# Patient Record
Sex: FEMALE | Race: WHITE | NOT HISPANIC OR LATINO | Employment: OTHER | URBAN - METROPOLITAN AREA
[De-identification: names, ages, dates, MRNs, and addresses within clinical notes are randomized per-mention and may not be internally consistent; named-entity substitution may affect disease eponyms.]

---

## 2021-05-14 ENCOUNTER — OFFICE VISIT (OUTPATIENT)
Dept: INTERNAL MEDICINE CLINIC | Facility: CLINIC | Age: 81
End: 2021-05-14
Payer: MEDICARE

## 2021-05-14 ENCOUNTER — APPOINTMENT (OUTPATIENT)
Dept: RADIOLOGY | Facility: CLINIC | Age: 81
End: 2021-05-14
Payer: MEDICARE

## 2021-05-14 VITALS
TEMPERATURE: 97.9 F | HEART RATE: 85 BPM | DIASTOLIC BLOOD PRESSURE: 68 MMHG | OXYGEN SATURATION: 96 % | SYSTOLIC BLOOD PRESSURE: 118 MMHG | WEIGHT: 226.8 LBS

## 2021-05-14 DIAGNOSIS — K59.09 CHRONIC CONSTIPATION: ICD-10-CM

## 2021-05-14 DIAGNOSIS — E11.9 TYPE 2 DIABETES MELLITUS WITHOUT COMPLICATION, WITH LONG-TERM CURRENT USE OF INSULIN (HCC): ICD-10-CM

## 2021-05-14 DIAGNOSIS — I25.810 CORONARY ARTERY DISEASE INVOLVING CORONARY BYPASS GRAFT OF NATIVE HEART WITHOUT ANGINA PECTORIS: ICD-10-CM

## 2021-05-14 DIAGNOSIS — Z79.4 TYPE 2 DIABETES MELLITUS WITHOUT COMPLICATION, WITH LONG-TERM CURRENT USE OF INSULIN (HCC): ICD-10-CM

## 2021-05-14 DIAGNOSIS — I10 ESSENTIAL HYPERTENSION: Primary | ICD-10-CM

## 2021-05-14 DIAGNOSIS — I10 ESSENTIAL HYPERTENSION: ICD-10-CM

## 2021-05-14 DIAGNOSIS — M25.471 RIGHT ANKLE SWELLING: Primary | ICD-10-CM

## 2021-05-14 DIAGNOSIS — M25.571 ACUTE RIGHT ANKLE PAIN: ICD-10-CM

## 2021-05-14 DIAGNOSIS — M25.471 RIGHT ANKLE SWELLING: ICD-10-CM

## 2021-05-14 DIAGNOSIS — I73.9 PERIPHERAL ARTERIAL DISEASE (HCC): ICD-10-CM

## 2021-05-14 PROCEDURE — 73610 X-RAY EXAM OF ANKLE: CPT

## 2021-05-14 PROCEDURE — 99204 OFFICE O/P NEW MOD 45 MIN: CPT | Performed by: FAMILY MEDICINE

## 2021-05-14 RX ORDER — OCTISALATE, AVOBENZONE, HOMOSALATE, AND OCTOCRYLENE 29.4; 29.4; 49; 25.48 MG/ML; MG/ML; MG/ML; MG/ML
1 LOTION TOPICAL
COMMUNITY

## 2021-05-14 RX ORDER — SIMVASTATIN 40 MG
40 TABLET ORAL
COMMUNITY
End: 2021-07-11

## 2021-05-14 RX ORDER — BLOOD SUGAR DIAGNOSTIC
STRIP MISCELLANEOUS
COMMUNITY
Start: 2021-03-29

## 2021-05-14 RX ORDER — CILOSTAZOL 100 MG/1
100 TABLET ORAL 2 TIMES DAILY
COMMUNITY
Start: 2021-03-02

## 2021-05-14 RX ORDER — LANCETS
EACH MISCELLANEOUS
COMMUNITY

## 2021-05-14 RX ORDER — ZOLPIDEM TARTRATE 5 MG/1
5 TABLET ORAL
COMMUNITY

## 2021-05-14 RX ORDER — ALCOHOL ANTISEPTIC PADS
PADS, MEDICATED (EA) TOPICAL
COMMUNITY

## 2021-05-14 RX ORDER — TRIAMCINOLONE ACETONIDE 1 MG/G
CREAM TOPICAL 2 TIMES DAILY
COMMUNITY

## 2021-05-14 RX ORDER — IBUPROFEN 600 MG/1
600 TABLET ORAL
COMMUNITY

## 2021-05-14 RX ORDER — BENAZEPRIL HYDROCHLORIDE 10 MG/1
10 TABLET ORAL DAILY
Qty: 30 TABLET | Refills: 2 | Status: SHIPPED | OUTPATIENT
Start: 2021-05-14 | End: 2021-05-14

## 2021-05-14 RX ORDER — BENAZEPRIL HYDROCHLORIDE 10 MG/1
10 TABLET ORAL DAILY
COMMUNITY
End: 2021-05-14 | Stop reason: SDUPTHER

## 2021-05-14 RX ORDER — EMPAGLIFLOZIN 10 MG/1
10 TABLET, FILM COATED ORAL EVERY MORNING
COMMUNITY
End: 2021-06-29

## 2021-05-14 RX ORDER — SUCRALFATE 1 G/1
1 TABLET ORAL
COMMUNITY
Start: 2021-02-22 | End: 2021-12-12

## 2021-05-14 RX ORDER — INSULIN DEGLUDEC INJECTION 100 U/ML
INJECTION, SOLUTION SUBCUTANEOUS
COMMUNITY

## 2021-05-14 RX ORDER — SIMETHICONE 80 MG
80 TABLET,CHEWABLE ORAL
COMMUNITY

## 2021-05-14 RX ORDER — INSULIN ASPART 100 [IU]/ML
INJECTION, SOLUTION INTRAVENOUS; SUBCUTANEOUS
COMMUNITY
Start: 2021-04-17

## 2021-05-14 RX ORDER — BLOOD-GLUCOSE METER
EACH MISCELLANEOUS
COMMUNITY

## 2021-05-14 RX ORDER — CARBOXYMETHYLCELLULOSE SODIUM AND GLYCERIN 5; 9 MG/ML; MG/ML
SOLUTION/ DROPS OPHTHALMIC
COMMUNITY

## 2021-05-14 RX ORDER — FUROSEMIDE 20 MG/1
20 TABLET ORAL 2 TIMES DAILY
COMMUNITY
End: 2022-03-14 | Stop reason: SDUPTHER

## 2021-05-14 RX ORDER — BENAZEPRIL HYDROCHLORIDE 10 MG/1
10 TABLET ORAL DAILY
Qty: 90 TABLET | Refills: 1 | Status: SHIPPED | OUTPATIENT
Start: 2021-05-14 | End: 2021-11-15

## 2021-05-14 NOTE — PROGRESS NOTES
INITIAL OFFICE VISIT  [facility] - [facility] OF [address] [place] [address]    NAME: Dwight Jiménez  AGE: 68 y o  SEX: female  : 1940     DATE: 2021     Assessment and Plan:     1  Right ankle swelling  2  Acute right ankle pain- hx of ruptures  Acute on set  ROM intact, no gapping or loss of plantarflexion strenght but has swelling and point tenderness over th achilles  differential includes tear vs tendenopathy vs stress fracture  X ray ordered  Pt to continue using otc analgesia     3  Peripheral arterial disease (HCC)-chronic  Stable  Follows with vascular  Pt to continue statin     4  Coronary artery disease involving coronary bypass graft of native heart without angina pectoris-stable  No angina  Follows with cardiology  Pt to continue duel antiplatelet therapy with asa and cilostazol    5  Type 2 diabetes mellitus without complication, with long-term current use of insulin ([address] )- fair control with basal bolus insulin  a1c of 7 6 on 10/2020  Encouraged pt to start jardiance 10mg qd for tighter control and decreased insuline requirement  7  Essential hypertension-well controlled  Pt to continue benazepril and lopressor therapy  - benazepril (LOTENSIN) 10 mg tablet; Take 1 tablet (10 mg total) by mouth daily  Dispense: 30 tablet; Refill: 2    8  Chronic constipation-follows with GI  Has tried multiple therapies  Uses stool softeners and laxatives intermittently  Diet is poor  encouraged pt to increase fiber and water intake  9  Body mass index (BMI) of 40 0 to 44 9 in adult ([facility])-note counseling below  BMI is above normal  Nutrition recommendations include reducing portion sizes, 3-5 servings of fruits/vegetables daily, consuming healthier snacks and reducing intake of saturated fat and trans fat  Return in about 4 months (around 2021) for Next scheduled follow up       Chief Complaint:     Chief Complaint   Patient presents with   [facility]     patient also has some questions and concerns, patient has been having issues with her bowel movements and she is feeling a bit sad because of it and she is having leg pain and she thinks she needs physical therapy  patient thinks her large abdomen maybe ausing her breathing isues when she lays down as well       History of Present Illness:   81-year-old female presents to [facility] care  Last seen previous PCP fall of last year  follow-up for medical history including CAD status post CABG requiring triple grafts, type 2 diabetes, hypertension, hyperlipidemia, peripheral vascular disease, OA of the knees, FILIBERTO recent sleep study  Will be staring the CPAP in the up coming weeks  today patient reports stomach discomfort and leg pain  Leg- acute  Onset x 4weeks  Quality is tight, pulling pain  Right Low posteior calf into the ankle  Pain is aggravated when she extends the leg to stretch or point her toes backward or if she walks too much  Associated with swelling in the ankle  moderaly alleviated with tylenol  Denies injury to foot  Had recent eval in the ed recently and a DVT was ruled out  Concerned for her achilles  Has history of achilles tendon tear of her left foot  Stomach- chronic issue  Onset years  constipation and gas  Taking 3 stool softeners and miralax whenever she goes more than one day without stooling  No blood in stool  Small slivers of stool  Has push a lot  No melena  She sees  A GI provider in  [place]  Uses Sulcrafate  Tired dicyclomine but it worsened the pain  Had been on linzess for a period of time but gave her explosive stools  Had EGD and COLO in 2019  Eats rye and white bread  Vegetable only at dinner time  1 serving of fruit daily  2-3 bottles of water daily  Consumes multiple servings of cheese  Follows with Endocrinology for diabetes  Last seen about 3 months ago  On 18units of tresiba and a sliding scale of the novolog   Was prescribed jardiance her last visit with the specialist but hasn't started it    has been established with vascular for PVD  Has significant plaque burden in both femoral arteries  last seen vascular provider was months ago   last visit with Cardiology was January  No chronic angina  Denies hx of congestive heart failure  Lives with daughter and brother  Brother has dementia  Pt provides care         The following portions of the patient's history were reviewed and updated as appropriate: allergies, current medications, past family history, past medical history, past social history, past surgical history and problem list      Review of Systems:     Review of Systems   Constitutional: Positive for activity change and fatigue  Negative for appetite change  HENT: Positive for rhinorrhea  Negative for congestion and sore throat  Eyes: Negative for visual disturbance (wears glasses )  Respiratory: Negative for cough and chest tightness  Cardiovascular: Negative for chest pain  Gastrointestinal: Positive for abdominal pain and constipation  Negative for blood in stool  Genitourinary: Positive for difficulty urinating (urinary incontenice  )  Negative for hematuria  Musculoskeletal: Positive for arthralgias and myalgias  Negative for gait problem  Allergic/Immunologic: Positive for environmental allergies  Neurological: Negative for headaches  Psychiatric/Behavioral: Positive for sleep disturbance  Past Medical History:   History reviewed  No pertinent past medical history       Past Surgical History:     Past Surgical History:   Procedure Laterality Date    CARDIAC SURGERY          Social History:     Social History     Socioeconomic History    Marital status: Single     Spouse name: None    Number of children: None    Years of education: None    Highest education level: None   Occupational History    None   Social Needs    Financial resource strain: None    Food insecurity     Worry: None     Inability: None    Transportation needs     Medical: None     Non-medical: None   Tobacco Use    Smoking status: Never Smoker    Smokeless tobacco: Never Used   Substance and Sexual Activity    Alcohol use:  Yes     Alcohol/week: 1 0 standard drinks     Types: 1 Glasses of wine per week     Frequency: Monthly or less     Drinks per session: 1 or 2     Binge frequency: Never     Comment: rarely     Drug use: Never    Sexual activity: None   Lifestyle    Physical activity     Days per week: 0 days     Minutes per session: 0 min    Stress: Not at all   Relationships    Social connections     Talks on phone: None     Gets together: None     Attends Synagogue service: None     Active member of club or organization: None     Attends meetings of clubs or organizations: None     Relationship status: None    Intimate partner violence     Fear of current or ex partner: None     Emotionally abused: None     Physically abused: None     Forced sexual activity: None   Other Topics Concern    None   Social History Narrative    None         Family History:     Family History   Problem Relation Age of Onset    Heart disease Mother     Diabetes Mother     Hypertension Mother     Heart disease Father     Hypertension Father     Diabetes Father         Current Medications:     Current Outpatient Medications:     Carboxymethylcellul-Glycerin (Refresh Optive) 0 5-0 9 % SOLN, Apply to eye, Disp: , Rfl:     cilostazol (PLETAL) 100 mg tablet, Take 100 mg by mouth 2 (two) times a day, Disp: , Rfl:     Empagliflozin (Jardiance) 10 MG TABS, Take 10 mg by mouth every morning, Disp: , Rfl:     furosemide (LASIX) 20 mg tablet, Take 20 mg by mouth 2 (two) times a day, Disp: , Rfl:     glucose blood (Accu-Chek Guide) test strip, TEST 4 TIMES DAILY DX E11 65, Disp: , Rfl:     glucose blood test strip, Use 1 each daily as needed Use as instructed, Disp: , Rfl:     insulin aspart (NovoLOG) 100 units/mL injection, Inject under the skin, Disp: , Rfl:     insulin degludec [place]y FlexTouch) 100 units/mL injection pen, Inject under the skin, Disp: , Rfl:     metoprolol tartrate (LOPRESSOR) 25 mg tablet, Take 25 mg by mouth every 12 (twelve) hours, Disp: , Rfl:     simvastatin (ZOCOR) 40 mg tablet, Take 40 mg by mouth daily at bedtime, Disp: , Rfl:     triamcinolone (KENALOG) 0 1 % cream, Apply topically 2 (two) times a day, Disp: , Rfl:     zolpidem (AMBIEN) 5 mg tablet, Take 5 mg by mouth daily at bedtime as needed for sleep, Disp: , Rfl:     Accu-Chek FastClix Lancets MISC, Use, Disp: , Rfl:     Aspirin Buf,CaCarb-MgCarb-MgO, 81 MG TABS, Take 81 mg by mouth, Disp: , Rfl:     B Complex-Biotin-FA (HM Vitamin B100 Complex) TABS, Take by mouth, Disp: , Rfl:     benazepril (LOTENSIN) 10 mg tablet, Take 1 tablet (10 mg total) by mouth daily, Disp: 90 tablet, Rfl: 1    Blood Glucose Monitoring Suppl (Accu-Chek Guide) w/Device KIT, Use, Disp: , Rfl:     ibuprofen (MOTRIN) 600 mg tablet, Take 600 mg by mouth, Disp: , Rfl:     NovoLOG FlexPen 100 units/mL injection pen, 18 UNITS BEFORE MEALS   ISF 40/140 UP TP 75 UNITS PER DAY, Disp: , Rfl:     Probiotic Product (Align) 4 MG CAPS, Take 1 capsule by mouth, Disp: , Rfl:     simethicone (MYLICON) 80 mg chewable tablet, Chew 80 mg, Disp: , Rfl:     sucralfate (CARAFATE) 1 g tablet, Take 1 g by mouth 4 (four) times a day (before meals and at bedtime) Take on an empty stomach 1 hour before meals, Disp: , Rfl:      Allergies: Allergies   Allergen Reactions    Keflex [Cephalexin] Hives    Penicillins Rash        Physical Exam:     /68 (BP Location: Left arm, Patient Position: Sitting, Cuff Size: Standard) Comment: bp  Pulse 85   Temp 97 9 °F (36 6 °C) (Temporal) Comment: Tylenol and she took 500mg this morning  Wt 103 kg (226 lb 12 8 oz)   SpO2 96%     Physical Exam  Constitutional:       Appearance: She is obese  She is not ill-appearing  HENT:      Head: Normocephalic and atraumatic  Right Ear: Tympanic membrane and external ear normal       Left Ear: Tympanic membrane and external ear normal       Nose: Nose normal    Eyes:      Conjunctiva/sclera: Conjunctivae normal       Pupils: Pupils are equal, round, and reactive to light  Cardiovascular:      Rate and Rhythm: Normal rate and regular rhythm  Heart sounds: No murmur  Pulmonary:      Effort: Pulmonary effort is normal       Breath sounds: Normal breath sounds  Abdominal:      General: Bowel sounds are decreased  Palpations: Abdomen is soft  Tenderness: There is no abdominal tenderness  There is no guarding  Musculoskeletal:         General: Swelling (over the lateral malleous of the right ankle ) and tenderness (point tenderness over right achilles ) present  Right ankle: She exhibits normal range of motion and no ecchymosis  No lateral malleolus tenderness found  Right lower leg: No edema  Left lower leg: No edema  Neurological:      Mental Status: She is alert and oriented to person, place, and time  Gait: Gait abnormal       Deep Tendon Reflexes: Reflexes normal    Psychiatric:         Behavior: Behavior normal            Data:     Laboratory Results: I have personally reviewed the pertinent laboratory results/reports   Radiology/Other Diagnostic Testing Results: I have personally reviewed pertinent reports  Raymundo Castañeda DO  [facility]      BMI Counseling: There is no height or weight on file to calculate BMI  The BMI is above normal  Nutrition recommendations include reducing portion sizes, 3-5 servings of fruits/vegetables daily, consuming healthier snacks and reducing intake of saturated fat and trans fat

## 2021-05-14 NOTE — PATIENT INSTRUCTIONS

## 2021-05-25 ENCOUNTER — TELEPHONE (OUTPATIENT)
Dept: INTERNAL MEDICINE CLINIC | Facility: CLINIC | Age: 81
End: 2021-05-25

## 2021-05-25 DIAGNOSIS — M77.50 ENTHESOPATHY OF ANKLE: Primary | ICD-10-CM

## 2021-05-25 NOTE — TELEPHONE ENCOUNTER
----- Message from Gurdeep Santiago DO sent at 5/25/2021 12:54 PM EDT -----  Regarding: x ray results  Please notify pt that xray shows some chronic inflammation involving the achilles tendon  She needs to follow up with orthopedics for treatment recommendation     Referral placed today      -Dr Rudolph Vang

## 2021-06-22 ENCOUNTER — TELEPHONE (OUTPATIENT)
Dept: INTERNAL MEDICINE CLINIC | Facility: CLINIC | Age: 81
End: 2021-06-22

## 2021-06-22 DIAGNOSIS — B37.0 THRUSH: Primary | ICD-10-CM

## 2021-06-22 NOTE — TELEPHONE ENCOUNTER
Patient states she has thrush and wants to know if Dr Homer Cuba can recommend something that she can do  She has been trying home remedies but its not working

## 2021-06-23 NOTE — TELEPHONE ENCOUNTER
Nystatin solution sent to pharmacy  Pt to use 4 times a day for the next 7 days  Swish the solution around and then spit

## 2021-06-28 NOTE — TELEPHONE ENCOUNTER
When patient put the dropper in the   nystatin (MYCOSTATIN) 500,000 units/5 mL suspension     She spilled a little, would like to know if you can order more, also still has thrush, no better    cb # [phone]    [facility] # [phone]

## 2021-06-29 ENCOUNTER — OFFICE VISIT (OUTPATIENT)
Dept: INTERNAL MEDICINE CLINIC | Facility: CLINIC | Age: 81
End: 2021-06-29
Payer: MEDICARE

## 2021-06-29 VITALS
DIASTOLIC BLOOD PRESSURE: 60 MMHG | HEART RATE: 70 BPM | TEMPERATURE: 97.9 F | BODY MASS INDEX: 41.91 KG/M2 | SYSTOLIC BLOOD PRESSURE: 110 MMHG | OXYGEN SATURATION: 97 % | WEIGHT: 222 LBS | HEIGHT: 61 IN

## 2021-06-29 DIAGNOSIS — K59.09 CHRONIC CONSTIPATION: ICD-10-CM

## 2021-06-29 DIAGNOSIS — E11.9 TYPE 2 DIABETES MELLITUS WITHOUT COMPLICATION, WITH LONG-TERM CURRENT USE OF INSULIN (HCC): ICD-10-CM

## 2021-06-29 DIAGNOSIS — Z79.4 TYPE 2 DIABETES MELLITUS WITHOUT COMPLICATION, WITH LONG-TERM CURRENT USE OF INSULIN (HCC): ICD-10-CM

## 2021-06-29 DIAGNOSIS — R11.0 NAUSEA: ICD-10-CM

## 2021-06-29 DIAGNOSIS — B37.0 THRUSH: Primary | ICD-10-CM

## 2021-06-29 DIAGNOSIS — I10 ESSENTIAL HYPERTENSION: ICD-10-CM

## 2021-06-29 PROCEDURE — 99214 OFFICE O/P EST MOD 30 MIN: CPT | Performed by: FAMILY MEDICINE

## 2021-06-29 RX ORDER — ONDANSETRON 4 MG/1
4 TABLET, ORALLY DISINTEGRATING ORAL EVERY 6 HOURS PRN
Qty: 20 TABLET | Refills: 0 | Status: SHIPPED | OUTPATIENT
Start: 2021-06-29

## 2021-06-29 RX ORDER — GLIPIZIDE 5 MG/1
TABLET, FILM COATED, EXTENDED RELEASE ORAL
COMMUNITY
Start: 2021-06-16

## 2021-06-29 NOTE — PATIENT INSTRUCTIONS
Start using the nystatin solution for the next 10 days     Make sure follow up with the stomach doctor

## 2021-06-29 NOTE — PROGRESS NOTES
FOLLOW-UP OFFICE VISIT  [facility] - [facility] OF [address] [place] [address]    NAME: Clarke Carrel  AGE: 68 y o  SEX: female  : 1940     DATE: 2021     Assessment and Plan:     Problem List Items Addressed This Visit        Digestive    Chronic constipation    Relevant Medications    ondansetron (ZOFRAN-ODT) 4 mg disintegrating tablet       Endocrine    Type 2 diabetes mellitus without complication, with long-term current use of insulin (HCC)    Relevant Medications    glipiZIDE (GLUCOTROL XL) 5 mg 24 hr tablet       Cardiovascular and Mediastinum    Essential hypertension      Other Visit Diagnoses     Thrush    -  Primary    Relevant Medications    nystatin (MYCOSTATIN) 500,000 units/5 mL suspension    Nausea        Relevant Medications    ondansetron (ZOFRAN-ODT) 4 mg disintegrating tablet        Plan; pt to re-start nystatin use x 10 days  Pt to follow up with GI with ongoing chronic constipation  Pt to trial miralax in the mean time  Zofran givn for occasional nausea associated with constipation  BP well controlled  Pt to continue antihypertensives as prescribed  Return in about 5 months (around 2021)  Chief Complaint:     Chief Complaint   Patient presents with    thrush        History of Present Illness: [place] [employer] started a week ago  treid nystatin but mised some dosese  Only taken 4 days  Feels like she had discomfort in the back of throat while using the CPAP  Denies sore throat otherwise  She had intraarticular injections last Wednesday  Follows with endocrine for her diabetes  Was taken off of jardiance but started on glipidze last week as well  Blood sugars were high the day of the knee injections  Review of Systems:     Review of Systems   Constitutional: Negative for chills and fever  HENT: Negative for trouble swallowing  Thrush    Cardiovascular: Negative for chest pain     Gastrointestinal: Positive for constipation and nausea  Negative for vomiting  Problem List:     Patient Active Problem List   Diagnosis    PVD (peripheral vascular disease) ([address] )    Body mass index (BMI) of 40 0 to 44 9 in adult [facility])    Coronary artery disease involving coronary bypass graft of native heart without angina pectoris    Type 2 diabetes mellitus without complication, with long-term current use of insulin (HCC)    Essential hypertension    Chronic constipation        Objective:     /60 (BP Location: Left arm, Patient Position: Sitting) Comment: gdf  Pulse 70   Temp 97 9 °F (36 6 °C) (Tympanic) Comment: gfd  Ht 5' 1" (1 549 m)   Wt 101 kg (222 lb)   SpO2 97%   BMI 41 95 kg/m²     Physical Exam  Constitutional:       Appearance: She is obese  HENT:      Head: Normocephalic and atraumatic  Right Ear: External ear normal       Left Ear: External ear normal       Mouth/Throat:      Mouth: Mucous membranes are moist       Tongue: Lesions present  Pharynx: Posterior oropharyngeal erythema present  No oropharyngeal exudate  Tonsils: No tonsillar exudate  Eyes:      Conjunctiva/sclera: Conjunctivae normal    Cardiovascular:      Rate and Rhythm: Normal rate  Pulmonary:      Effort: Pulmonary effort is normal    Neurological:      Mental Status: She is alert  Pertinent Laboratory/Diagnostic Studies:    Laboratory Results: I have personally reviewed the pertinent laboratory results/reports     CBC: No results for input(s): WBC, RBC, HGB, HCT, MCV, MCH, MCHC, RDW, MPV, PLT, NRBC, NEUTOPHILPCT, LYMPHOPCT, MONOPCT, EOSPCT, BASOPCT, NEUTROABS, LYMPHSABS, MONOSABS, EOSABS, MONOSABS in the last 8784 hours  Chemistry Profile: No results for input(s): NA, K, CL, CO2, ANIONGAP, BUN, CREATININE, GLUF, GLUC, GLUCOSE, CALCIUM, CORRECTEDCA, MG, PHOS, AST, ALT, ALKPHOS, PROT, BILITOT, EGFR, GFRAA, GFRNONAA, EGFRAA, EGFRNAA, EGFRNONAFA in the last 8784 hours      Invalid input(s): [address] [facility] , [address] [facility], [address], EXTANIONGAP, EXTBUN, EXTCREAT, EXTGLUBLD, GLU, SLAMBGLUCOSE, EXTCALCIUM, CAADJUST, ALBUMIN, SERUMALBUMIN, EXTEGFR  Endocrine Studies:   Results from Last 12 Months   Lab Units 06/15/21  0717   HEMOGLOBIN A1C % 7 1*       Radiology/Other Diagnostic Testing Results: I have personally reviewed pertinent reports          Elvin Kocher D O R[facility]HL[facility] HSPTLGearldean Grammes [facility]  6/29/2021 1:13 PM

## 2021-07-02 ENCOUNTER — TELEPHONE (OUTPATIENT)
Dept: INTERNAL MEDICINE CLINIC | Facility: CLINIC | Age: 81
End: 2021-07-02

## 2021-07-02 ENCOUNTER — NURSE TRIAGE (OUTPATIENT)
Dept: OTHER | Facility: OTHER | Age: 81
End: 2021-07-02

## 2021-07-02 DIAGNOSIS — L85.3 XEROSIS OF SKIN: Primary | ICD-10-CM

## 2021-07-02 RX ORDER — AMMONIUM LACTATE 12 G/100G
CREAM TOPICAL AS NEEDED
Qty: 385 G | Refills: 0 | Status: SHIPPED | OUTPATIENT
Start: 2021-07-02

## 2021-07-02 NOTE — TELEPHONE ENCOUNTER
Patient saw Dr Batool Rollins on 6/29  Was told that a cream would be ordered for the break out on her face  Patient never received script  Please order

## 2021-07-03 NOTE — TELEPHONE ENCOUNTER
Patient had questions related to recent glipizide prescription by endocrinologist  Physician is not with network  Advised patient if call is questions related to glipizide, to call her endocrinologist's office for guidance  Reason for Disposition   [1] Caller has NON-URGENT medicine question about med that PCP prescribed AND [2] triager unable to answer question    Answer Assessment - Initial Assessment Questions  1  NAME of MEDICATION: "What medicine are you calling about?"      Glipizide  2  QUESTION: "What is your question?" (e g , medication refill, side effect)      Wants to discuss medication and potential side effects  3  PRESCRIBING HCP: "Who prescribed it?" Reason: if prescribed by specialist, call should be referred to that group  Endocrinologist with [facility]  4  SYMPTOMS: "Do you have any symptoms?"      Hand and foot pain and frequent urination  5   SEVERITY: If symptoms are present, ask "Are they mild, moderate or severe?"      Mild    Protocols used: MEDICATION QUESTION CALL-ADULT-

## 2021-07-03 NOTE — TELEPHONE ENCOUNTER
Regarding: diabetic meds questions, hand and feet pain and UTI symptoms  ----- Message from Giovanni Gray sent at 7/2/2021  8:30 PM EDT -----  "I have questions about my diabetic medication; I am having hand and feet pain and UTI symptoms  "

## 2021-07-12 ENCOUNTER — TELEPHONE (OUTPATIENT)
Dept: INTERNAL MEDICINE CLINIC | Facility: CLINIC | Age: 81
End: 2021-07-12

## 2021-07-12 RX ORDER — ZOLPIDEM TARTRATE 5 MG/1
5 TABLET ORAL
Qty: 30 TABLET | OUTPATIENT
Start: 2021-07-12

## 2021-07-12 NOTE — TELEPHONE ENCOUNTER
Patient finished her medication for thrush but sill has a coating on her tongue   Cherry Tellez  wants to know if she needs more med or something different

## 2021-07-13 ENCOUNTER — TELEPHONE (OUTPATIENT)
Dept: INTERNAL MEDICINE CLINIC | Facility: CLINIC | Age: 81
End: 2021-07-13

## 2021-07-13 DIAGNOSIS — B37.0 THRUSH: ICD-10-CM

## 2021-07-13 NOTE — TELEPHONE ENCOUNTER
Likely not candida (thrush) if refractory to nystatin x 2 courses  Other causes include chronic dry mouth, smoking, excessive alcohol consumption, chronic mouth breathing, and a low fiber diet  If she has any fever, night sweats, or hx of chew tobacco use it may indicate something a little more concerning  I would have her first address hydration and diet to help with white tongue coating

## 2021-07-13 NOTE — TELEPHONE ENCOUNTER
Still has some coating on tongue    1st med was for 10 days    2nd med for 14 days     Is this normal or not

## 2021-07-14 NOTE — TELEPHONE ENCOUNTER
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

## 2021-09-10 DIAGNOSIS — Z79.4 TYPE 2 DIABETES MELLITUS WITHOUT COMPLICATION, WITH LONG-TERM CURRENT USE OF INSULIN (HCC): ICD-10-CM

## 2021-09-10 DIAGNOSIS — E11.9 TYPE 2 DIABETES MELLITUS WITHOUT COMPLICATION, WITH LONG-TERM CURRENT USE OF INSULIN (HCC): ICD-10-CM

## 2021-09-10 RX ORDER — SIMVASTATIN 40 MG
TABLET ORAL
Qty: 30 TABLET | Refills: 0 | Status: SHIPPED | OUTPATIENT
Start: 2021-09-10 | End: 2021-10-07

## 2021-09-24 ENCOUNTER — OFFICE VISIT (OUTPATIENT)
Dept: INTERNAL MEDICINE CLINIC | Facility: CLINIC | Age: 81
End: 2021-09-24
Payer: MEDICARE

## 2021-09-24 VITALS
DIASTOLIC BLOOD PRESSURE: 70 MMHG | OXYGEN SATURATION: 96 % | HEART RATE: 78 BPM | TEMPERATURE: 98.9 F | HEIGHT: 61 IN | WEIGHT: 225 LBS | SYSTOLIC BLOOD PRESSURE: 120 MMHG | BODY MASS INDEX: 42.48 KG/M2

## 2021-09-24 DIAGNOSIS — R09.82 POST-NASAL DRIP: ICD-10-CM

## 2021-09-24 DIAGNOSIS — Z23 NEEDS FLU SHOT: ICD-10-CM

## 2021-09-24 DIAGNOSIS — I10 ESSENTIAL HYPERTENSION: ICD-10-CM

## 2021-09-24 DIAGNOSIS — J34.89 RHINORRHEA: ICD-10-CM

## 2021-09-24 DIAGNOSIS — E11.9 TYPE 2 DIABETES MELLITUS WITHOUT COMPLICATION, WITH LONG-TERM CURRENT USE OF INSULIN (HCC): ICD-10-CM

## 2021-09-24 DIAGNOSIS — Z79.4 TYPE 2 DIABETES MELLITUS WITHOUT COMPLICATION, WITH LONG-TERM CURRENT USE OF INSULIN (HCC): ICD-10-CM

## 2021-09-24 DIAGNOSIS — Z00.00 ENCOUNTER FOR SUBSEQUENT ANNUAL WELLNESS VISIT (AWV) IN MEDICARE PATIENT: ICD-10-CM

## 2021-09-24 DIAGNOSIS — Z11.59 NEED FOR HEPATITIS C SCREENING TEST: Primary | ICD-10-CM

## 2021-09-24 PROCEDURE — 99214 OFFICE O/P EST MOD 30 MIN: CPT | Performed by: FAMILY MEDICINE

## 2021-09-24 PROCEDURE — 92250 FUNDUS PHOTOGRAPHY W/I&R: CPT | Performed by: FAMILY MEDICINE

## 2021-09-24 PROCEDURE — G0008 ADMIN INFLUENZA VIRUS VAC: HCPCS | Performed by: FAMILY MEDICINE

## 2021-09-24 PROCEDURE — G0438 PPPS, INITIAL VISIT: HCPCS | Performed by: FAMILY MEDICINE

## 2021-09-24 PROCEDURE — 1123F ACP DISCUSS/DSCN MKR DOCD: CPT | Performed by: FAMILY MEDICINE

## 2021-09-24 PROCEDURE — 90662 IIV NO PRSV INCREASED AG IM: CPT | Performed by: FAMILY MEDICINE

## 2021-09-24 RX ORDER — FLUTICASONE PROPIONATE 50 MCG
1 SPRAY, SUSPENSION (ML) NASAL DAILY
Qty: 11.1 ML | Refills: 1 | Status: SHIPPED | OUTPATIENT
Start: 2021-09-24 | End: 2021-10-16

## 2021-09-24 RX ORDER — BENZONATATE 100 MG/1
100 CAPSULE ORAL 3 TIMES DAILY PRN
Qty: 20 CAPSULE | Refills: 0 | Status: SHIPPED | OUTPATIENT
Start: 2021-09-24

## 2021-09-24 NOTE — PROGRESS NOTES
FOLLOW-UP OFFICE VISIT  [facility] - [facility]    NAME: Demetrio Client  AGE: 68 y o  SEX: female  : 1940     DATE: 2021     Assessment and Plan:     Problem List Items Addressed This Visit        Endocrine    Type 2 diabetes mellitus without complication, with long-term current use of insulin ([address] )    Relevant Orders    Hemoglobin A1C ([facility])    IRIS Diabetic eye exam       Cardiovascular and Mediastinum    Essential hypertension    Relevant Orders    Basic metabolic panel      Other Visit Diagnoses     Need for hepatitis C screening test    -  Primary    Relevant Orders    Hepatitis C Antibody ([facility])    Post-nasal drip        Relevant Medications    benzonatate (TESSALON PERLES) 100 mg capsule    Rhinorrhea        Relevant Medications    fluticasone (FLONASE) 50 mcg/act nasal spray    Needs flu shot        Relevant Orders    influenza vaccine, high-dose, PF 0 7 mL (FLUZONE HIGH-DOSE)       plan: Overall patient is doing well with her chronic metabolic diseases  BP at goal today  Patient   To continue Lopressor 25 mg q day and benzapril 10 mg q day andstatin  Will check an up-to-date A1c  Symptoms management with Tessalon Perles and Flonase provided  Influenza vaccine given and well tolerated  Return in about 6 months (around 3/24/2022) for Next scheduled follow up  Chief Complaint:     Chief Complaint   Patient presents with    Follow-up     post nasal drip, congestion, runny nose- started a week ago- cant use cpap machine         History of Present Illness:      81-year-old female past medical history significant for type 2 diabetes, chronic constipation, hypertension and PVD presents for follow-up  Today she reports new onset postnasal drip, rhinorrhea and congestion x1 week  Denies fever, cough, shortness of breath or chest 8  Has not tried any over-the-counter therapies for this as of yet    Start using her CPAP because postnasal drip and congestion are worse at night when she is lying down  Patient status post knee injection for bilateral OA of the knee  She was just started physical therapy  Has not been doing any the home exercises as encouraged  Has recently seen her endocrinologist   Diabetes therapy was not changed  Her last A1c of June of this year was 7 1  Patient reports her constipation has improved with daily MiraLax therapy  Has to use   Multiple magnesia seldomly  Review of Systems:     Review of Systems   Constitutional: Negative for appetite change and fever  Respiratory: Negative for shortness of breath  Cardiovascular: Negative for chest pain  Gastrointestinal: Positive for constipation  Negative for diarrhea  Musculoskeletal: Positive for gait problem  Problem List:     Patient Active Problem List   Diagnosis    PVD (peripheral vascular disease) ([facility] [address] )    Body mass index (BMI) of 40 0 to 44 9 in adult [facility])    Coronary artery disease involving coronary bypass graft of native heart without angina pectoris    Type 2 diabetes mellitus without complication, with long-term current use of insulin ([facility])    Essential hypertension    Chronic constipation        Objective:     /70 (BP Location: Left arm, Patient Position: Sitting)   Pulse 78   Temp 98 9 °F (37 2 °C) (Tympanic)   Ht 5' 1" (1 549 m)   Wt 102 kg (225 lb)   SpO2 96%   BMI 42 51 kg/m²     Physical Exam  HENT:      Head: Normocephalic and atraumatic  Right Ear: External ear normal       Left Ear: External ear normal       Nose: Congestion present  Eyes:      Conjunctiva/sclera: Conjunctivae normal       Pupils: Pupils are equal, round, and reactive to light  Cardiovascular:      Rate and Rhythm: Normal rate and regular rhythm  Heart sounds: No murmur heard  Pulmonary:      Effort: Pulmonary effort is normal       Breath sounds: Normal breath sounds     Abdominal:      General: Bowel sounds are normal       Palpations: Abdomen is soft  Tenderness: There is no abdominal tenderness  Musculoskeletal:      Right lower leg: No edema  Left lower leg: No edema  Neurological:      Mental Status: She is alert and oriented to person, place, and time  Gait: Gait abnormal (Assistive device present)  Pertinent Laboratory/Diagnostic Studies:    Laboratory Results: I have personally reviewed the pertinent laboratory results/reports     Endocrine Studies:   Results from Last 12 Months   Lab Units 06/15/21  0717   HEMOGLOBIN A1C % 7 1*       Radiology/Other Diagnostic Testing Results: I have personally reviewed pertinent reports          Jessica MURRAY HSPTLSharee Gola [facility]  9/24/2021 10:38 AM

## 2021-09-24 NOTE — PROGRESS NOTES
Assessment and Plan:     Problem List Items Addressed This Visit        Endocrine    Type 2 diabetes mellitus without complication, with long-term current use of insulin ([facility])    Relevant Orders    Hemoglobin A1C ([facility])    IRIS Diabetic eye exam       Cardiovascular and Mediastinum    Essential hypertension    Relevant Orders    Basic metabolic panel      Other Visit Diagnoses     Need for hepatitis C screening test    -  Primary    Relevant Orders    Hepatitis C Antibody ([facility])    Post-nasal drip        Relevant Medications    benzonatate (TESSALON PERLES) 100 mg capsule    Rhinorrhea        Relevant Medications    fluticasone (FLONASE) 50 mcg/act nasal spray    Needs flu shot        Relevant Orders    influenza vaccine, high-dose, PF 0 7 mL (FLUZONE HIGH-DOSE)    Encounter for subsequent annual wellness visit (AWV) in Medicare patient               Preventive health issues were discussed with patient, and age appropriate screening tests were ordered as noted in patient's After Visit Summary  Personalized health advice and appropriate referrals for health education or preventive services given if needed, as noted in patient's After Visit Summary  History of Present Illness:     Patient presents for Medicare Annual Wellness visit    Patient Care Team:  Sharita Castillo DO as PCP - General (Family Medicine)     Problem List:     Patient Active Problem List   Diagnosis    PVD (peripheral vascular disease) ([facility] [address] )    Body mass index (BMI) of 40 0 to 44 9 in adult [facility])    Coronary artery disease involving coronary bypass graft of native heart without angina pectoris    Type 2 diabetes mellitus without complication, with long-term current use of insulin ([facility] [address] )    Essential hypertension    Chronic constipation      Past Medical and Surgical History:     History reviewed  No pertinent past medical history    Past Surgical History:   Procedure Laterality Date    CARDIAC SURGERY        Family History:     Family History   Problem Relation Age of Onset    Heart disease Mother     Diabetes Mother     Hypertension Mother     Heart disease Father     Hypertension Father     Diabetes Father       Social History:     Social History     Socioeconomic History    Marital status: Single     Spouse name: None    Number of children: None    Years of education: None    Highest education level: None   Occupational History    None   Tobacco Use    Smoking status: Never Smoker    Smokeless tobacco: Never Used   Vaping Use    Vaping Use: Never used   Substance and Sexual Activity    Alcohol use: Yes     Alcohol/week: 1 0 standard drinks     Types: 1 Glasses of wine per week     Comment: rarely     Drug use: Never    Sexual activity: None   Other Topics Concern    None   Social History Narrative    None     Social Determinants of Health     Financial Resource Strain:     Difficulty of Paying Living Expenses:    Food Insecurity:     Worried About Running Out of Food in the Last Year:     Ran Out of Food in the Last Year:    Transportation Needs:     Lack of Transportation (Medical):      Lack of Transportation (Non-Medical):    Physical Activity: Inactive    Days of Exercise per Week: 0 days    Minutes of Exercise per Session: 0 min   Stress: No Stress Concern Present    Feeling of Stress : Not at all   Social Connections:     Frequency of Communication with Friends and Family:     Frequency of Social Gatherings with Friends and Family:     Attends Oriental orthodox Services:     Active Member of Clubs or Organizations:     Attends Club or Organization Meetings:     Marital Status:    Intimate Partner Violence:     Fear of Current or Ex-Partner:     Emotionally Abused:     Physically Abused:     Sexually Abused:       Medications and Allergies:     Current Outpatient Medications   Medication Sig Dispense Refill    Accu-Chek FastClix Lancets MISC Use      ammonium lactate (LAC-HYDRIN) 12 % cream Apply topically as needed for dry skin 385 g 0    ascorbic Acid (VITAMIN C) 500 MG CPCR capsule, extended release: 500 mg 1 capsule twice a day by mouth      Aspirin Buf,CaCarb-MgCarb-MgO, 81 MG TABS Take 81 mg by mouth      B Complex-Biotin-FA (HM Vitamin B100 Complex) TABS Take by mouth      benazepril (LOTENSIN) 10 mg tablet Take 1 tablet (10 mg total) by mouth daily 90 tablet 1    Blood Glucose Monitoring Suppl (Accu-Chek Guide) w/Device KIT Use      Carboxymethylcellul-Glycerin (Refresh Optive) 0 5-0 9 % SOLN Apply to eye      Cholecalciferol 50 MCG (2000 UT) CAPS Take 2,000 Units by mouth      cilostazol (PLETAL) 100 mg tablet Take 100 mg by mouth 2 (two) times a day      furosemide (LASIX) 20 mg tablet Take 20 mg by mouth 2 (two) times a day      glipiZIDE (GLUCOTROL XL) 5 mg 24 hr tablet       glucose blood (Accu-Chek Guide) test strip TEST 4 TIMES DAILY DX E11 65      glucose blood test strip Use 1 each daily as needed Use as instructed      ibuprofen (MOTRIN) 600 mg tablet Take 600 mg by mouth      insulin aspart (NovoLOG) 100 units/mL injection Inject under the skin      insulin degludec Leitha Crazier FlexTouch) 100 units/mL injection pen Inject under the skin      metoprolol tartrate (LOPRESSOR) 25 mg tablet Take 25 mg by mouth every 12 (twelve) hours      NovoLOG FlexPen 100 units/mL injection pen 18 UNITS BEFORE MEALS   ISF 40/140 UP TP 75 UNITS PER DAY      ondansetron (ZOFRAN-ODT) 4 mg disintegrating tablet Take 1 tablet (4 mg total) by mouth every 6 (six) hours as needed for nausea or vomiting 20 tablet 0    Probiotic Product (Align) 4 MG CAPS Take 1 capsule by mouth      simethicone (MYLICON) 80 mg chewable tablet Chew 80 mg      simvastatin (ZOCOR) 40 mg tablet TAKE 1 TABLET BY MOUTH EVERY DAY IN THE EVENING 30 tablet 0    sucralfate (CARAFATE) 1 g tablet Take 1 g by mouth 4 (four) times a day (before meals and at bedtime) Take on an empty stomach 1 hour before meals      triamcinolone (KENALOG) 0 1 % cream Apply topically 2 (two) times a day      zolpidem (AMBIEN) 5 mg tablet Take 5 mg by mouth daily at bedtime as needed for sleep      benzonatate (TESSALON PERLES) 100 mg capsule Take 1 capsule (100 mg total) by mouth 3 (three) times a day as needed for cough 20 capsule 0    fluticasone (FLONASE) 50 mcg/act nasal spray 1 spray into each nostril daily 11 1 mL 1     No current facility-administered medications for this visit  Allergies   Allergen Reactions    Keflex [Cephalexin] Hives    Penicillins Rash    Atorvastatin Other (See Comments)     Other reaction(s): Muscle pain  Other reaction(s): Muscle pain      Metformin Diarrhea     Severe diarrhea  Severe diarrhea        Immunizations:     Immunization History   Administered Date(s) Administered    INFLUENZA 11/07/2016    Influenza Split High Dose Preservative Free IM 09/15/2020    Influenza Whole 11/01/2019, 09/15/2020    SARS-CoV-2 / COVID-19 mRNA IM (Mika Krause) 01/12/2021, 02/10/2021      Health Maintenance:         Topic Date Due    Hepatitis C Screening  Never done         Topic Date Due    Pneumococcal Vaccine: 65+ Years (1 of 2 - PPSV23) Never done    DTaP,Tdap,and Td Vaccines (1 - Tdap) Never done    Influenza Vaccine (1) 09/01/2021      Medicare Health Risk Assessment:     /70 (BP Location: Left arm, Patient Position: Sitting)   Pulse 78   Temp 98 9 °F (37 2 °C) (Tympanic)   Ht 5' 1" (1 549 m)   Wt 102 kg (225 lb)   SpO2 96%   BMI 42 51 kg/m²      Britni is here for her Subsequent Wellness visit  Health Risk Assessment:   Patient rates overall health as fair  Patient feels that their physical health rating is same  Patient is satisfied with their life  Eyesight was rated as same  Hearing was rated as same  Patient feels that their emotional and mental health rating is same  Patients states they are never, rarely angry  Patient states they are never, rarely unusually tired/fatigued   Pain experienced in the last 7 days has been some  Patient's pain rating has been 2/10  Fall Risk Screening: In the past year, patient has experienced: no history of falling in past year      Home Safety:  Patient has trouble with stairs inside or outside of their home  Patient has working smoke alarms and has working carbon monoxide detector  Nutrition:   Current diet is Regular  Medications:   Patient is currently taking over-the-counter supplements  OTC medications include: see medication list  Patient is able to manage medications  Activities of Daily Living (ADLs)/Instrumental Activities of Daily Living (IADLs):   Walk and transfer into and out of bed and chair?: Yes  Dress and groom yourself?: Yes    Bathe or shower yourself?: Yes    Feed yourself? Yes  Do your laundry/housekeeping?: Yes  Manage your money, pay your bills and track your expenses?: Yes  Make your own meals?: Yes    Do your own shopping?: Yes    Previous Hospitalizations:   Any hospitalizations or ED visits within the last 12 months?: No      Advance Care Planning:   Living will: No    Advanced directive: No    End of Life Decisions reviewed with patient: Yes      PREVENTIVE SCREENINGS      Cardiovascular Screening:    General: Screening Current      Diabetes Screening:     General: Screening Not Indicated and History Diabetes      Colorectal Cancer Screening:     General: Screening Not Indicated      Breast Cancer Screening:     General: Screening Current      Cervical Cancer Screening:    General: Screening Not Indicated      Lung Cancer Screening:     General: Screening Not Indicated      Hepatitis C Screening:    General: Risks and Benefits Discussed    Hep C Screening Accepted: Yes      Screening, Brief Intervention, and Referral to Treatment (SBIRT)    Screening      Single Item Drug Screening:  How often have you used an illegal drug (including marijuana) or a prescription medication for non-medical reasons in the past year? never    Single Item Drug Screen Score: 0  Interpretation: Negative screen for possible drug use disorder      Adriana Mcginnis DO

## 2021-10-12 LAB
LEFT EYE DIABETIC RETINOPATHY: NORMAL
LEFT EYE IMAGE QUALITY: NORMAL
LEFT EYE MACULAR EDEMA: NORMAL
LEFT EYE OTHER RETINOPATHY: NORMAL
RIGHT EYE DIABETIC RETINOPATHY: NORMAL
RIGHT EYE IMAGE QUALITY: NORMAL
RIGHT EYE MACULAR EDEMA: NORMAL
RIGHT EYE OTHER RETINOPATHY: NORMAL
SEVERITY (EYE EXAM): NORMAL

## 2021-10-16 DIAGNOSIS — J34.89 RHINORRHEA: ICD-10-CM

## 2021-10-16 RX ORDER — FLUTICASONE PROPIONATE 50 MCG
SPRAY, SUSPENSION (ML) NASAL
Qty: 16 ML | Refills: 1 | Status: SHIPPED | OUTPATIENT
Start: 2021-10-16 | End: 2022-04-07 | Stop reason: SDUPTHER

## 2021-10-22 ENCOUNTER — TELEPHONE (OUTPATIENT)
Dept: INTERNAL MEDICINE CLINIC | Facility: CLINIC | Age: 81
End: 2021-10-22

## 2021-10-29 ENCOUNTER — TELEPHONE (OUTPATIENT)
Dept: INTERNAL MEDICINE CLINIC | Facility: CLINIC | Age: 81
End: 2021-10-29

## 2021-11-13 DIAGNOSIS — I10 ESSENTIAL HYPERTENSION: ICD-10-CM

## 2021-11-15 RX ORDER — BENAZEPRIL HYDROCHLORIDE 10 MG/1
TABLET ORAL
Qty: 90 TABLET | Refills: 1 | Status: SHIPPED | OUTPATIENT
Start: 2021-11-15 | End: 2022-02-24

## 2021-12-05 DIAGNOSIS — Z79.4 TYPE 2 DIABETES MELLITUS WITHOUT COMPLICATION, WITH LONG-TERM CURRENT USE OF INSULIN (HCC): ICD-10-CM

## 2021-12-05 DIAGNOSIS — E11.9 TYPE 2 DIABETES MELLITUS WITHOUT COMPLICATION, WITH LONG-TERM CURRENT USE OF INSULIN (HCC): ICD-10-CM

## 2021-12-05 RX ORDER — SIMVASTATIN 40 MG
TABLET ORAL
Qty: 90 TABLET | Refills: 0 | Status: SHIPPED | OUTPATIENT
Start: 2021-12-05 | End: 2022-02-24

## 2021-12-13 ENCOUNTER — TELEPHONE (OUTPATIENT)
Dept: INTERNAL MEDICINE CLINIC | Facility: CLINIC | Age: 81
End: 2021-12-13

## 2021-12-13 NOTE — TELEPHONE ENCOUNTER
Pt is requesting Dr Roddy De León to RX cilostazol (PLETAL) 100 mg tablet  Was prescribed by her previous Dr Paige Chaudhry [phone]

## 2022-03-14 DIAGNOSIS — I10 ESSENTIAL HYPERTENSION: Primary | ICD-10-CM

## 2022-03-15 RX ORDER — FUROSEMIDE 20 MG/1
20 TABLET ORAL 2 TIMES DAILY
Qty: 60 TABLET | Refills: 2 | Status: SHIPPED | OUTPATIENT
Start: 2022-03-15 | End: 2022-06-11

## 2022-04-02 ENCOUNTER — APPOINTMENT (EMERGENCY)
Dept: CT IMAGING | Facility: HOSPITAL | Age: 82
End: 2022-04-02
Payer: MEDICARE

## 2022-04-02 ENCOUNTER — HOSPITAL ENCOUNTER (EMERGENCY)
Facility: HOSPITAL | Age: 82
Discharge: HOME | End: 2022-04-02
Attending: EMERGENCY MEDICINE | Admitting: EMERGENCY MEDICINE
Payer: MEDICARE

## 2022-04-02 VITALS
BODY MASS INDEX: 44.08 KG/M2 | RESPIRATION RATE: 20 BRPM | TEMPERATURE: 98 F | HEART RATE: 79 BPM | OXYGEN SATURATION: 97 % | SYSTOLIC BLOOD PRESSURE: 147 MMHG | DIASTOLIC BLOOD PRESSURE: 64 MMHG | HEIGHT: 61 IN | WEIGHT: 233.47 LBS

## 2022-04-02 DIAGNOSIS — R10.9 ABDOMINAL PAIN: Primary | ICD-10-CM

## 2022-04-02 LAB
ALBUMIN SERPL BCP-MCNC: 3.4 G/DL (ref 3.5–5)
ALP SERPL-CCNC: 87 U/L (ref 46–116)
ALT SERPL W P-5'-P-CCNC: 23 U/L (ref 12–78)
ANION GAP SERPL CALCULATED.3IONS-SCNC: 6 MMOL/L (ref 4–13)
AST SERPL W P-5'-P-CCNC: 12 U/L (ref 5–45)
BASOPHILS # BLD AUTO: 0.02 THOUSANDS/ΜL (ref 0–0.1)
BASOPHILS NFR BLD AUTO: 0 % (ref 0–1)
BILIRUB SERPL-MCNC: 0.55 MG/DL (ref 0.2–1)
BILIRUB UR QL STRIP: NEGATIVE
BUN SERPL-MCNC: 17 MG/DL (ref 5–25)
CALCIUM ALBUM COR SERPL-MCNC: 9.8 MG/DL (ref 8.3–10.1)
CALCIUM SERPL-MCNC: 9.3 MG/DL (ref 8.3–10.1)
CHLORIDE SERPL-SCNC: 103 MMOL/L (ref 100–108)
CLARITY UR: CLEAR
CO2 SERPL-SCNC: 28 MMOL/L (ref 21–32)
COLOR UR: YELLOW
CREAT SERPL-MCNC: 0.73 MG/DL (ref 0.6–1.3)
EOSINOPHIL # BLD AUTO: 0.05 THOUSAND/ΜL (ref 0–0.61)
EOSINOPHIL NFR BLD AUTO: 1 % (ref 0–6)
ERYTHROCYTE [DISTWIDTH] IN BLOOD BY AUTOMATED COUNT: 14 % (ref 11.6–15.1)
GFR SERPL CREATININE-BSD FRML MDRD: 78 ML/MIN/1.73SQ M
GLUCOSE SERPL-MCNC: 154 MG/DL (ref 65–140)
GLUCOSE UR STRIP-MCNC: NEGATIVE MG/DL
HCT VFR BLD AUTO: 42.3 % (ref 34.8–46.1)
HGB BLD-MCNC: 14.1 G/DL (ref 11.5–15.4)
HGB UR QL STRIP.AUTO: NEGATIVE
IMM GRANULOCYTES # BLD AUTO: 0.04 THOUSAND/UL (ref 0–0.2)
IMM GRANULOCYTES NFR BLD AUTO: 1 % (ref 0–2)
KETONES UR STRIP-MCNC: NEGATIVE MG/DL
LEUKOCYTE ESTERASE UR QL STRIP: NEGATIVE
LIPASE SERPL-CCNC: 62 U/L (ref 73–393)
LYMPHOCYTES # BLD AUTO: 2.11 THOUSANDS/ΜL (ref 0.6–4.47)
LYMPHOCYTES NFR BLD AUTO: 26 % (ref 14–44)
MCH RBC QN AUTO: 30.7 PG (ref 26.8–34.3)
MCHC RBC AUTO-ENTMCNC: 33.3 G/DL (ref 31.4–37.4)
MCV RBC AUTO: 92 FL (ref 82–98)
MONOCYTES # BLD AUTO: 0.54 THOUSAND/ΜL (ref 0.17–1.22)
MONOCYTES NFR BLD AUTO: 7 % (ref 4–12)
NEUTROPHILS # BLD AUTO: 5.38 THOUSANDS/ΜL (ref 1.85–7.62)
NEUTS SEG NFR BLD AUTO: 65 % (ref 43–75)
NITRITE UR QL STRIP: NEGATIVE
NRBC BLD AUTO-RTO: 0 /100 WBCS
PH UR STRIP.AUTO: 7.5 [PH]
PLATELET # BLD AUTO: 257 THOUSANDS/UL (ref 149–390)
PMV BLD AUTO: 10.5 FL (ref 8.9–12.7)
POTASSIUM SERPL-SCNC: 4.3 MMOL/L (ref 3.5–5.3)
PROT SERPL-MCNC: 6.8 G/DL (ref 6.4–8.2)
PROT UR STRIP-MCNC: NEGATIVE MG/DL
RBC # BLD AUTO: 4.59 MILLION/UL (ref 3.81–5.12)
SODIUM SERPL-SCNC: 137 MMOL/L (ref 136–145)
SP GR UR STRIP.AUTO: 1.01 (ref 1–1.03)
UROBILINOGEN UR QL STRIP.AUTO: 0.2 E.U./DL
WBC # BLD AUTO: 8.14 THOUSAND/UL (ref 4.31–10.16)

## 2022-04-02 PROCEDURE — 85025 COMPLETE CBC W/AUTO DIFF WBC: CPT | Performed by: PHYSICIAN ASSISTANT

## 2022-04-02 PROCEDURE — 96365 THER/PROPH/DIAG IV INF INIT: CPT

## 2022-04-02 PROCEDURE — 81003 URINALYSIS AUTO W/O SCOPE: CPT | Performed by: PHYSICIAN ASSISTANT

## 2022-04-02 PROCEDURE — 74177 CT ABD & PELVIS W/CONTRAST: CPT

## 2022-04-02 PROCEDURE — 96366 THER/PROPH/DIAG IV INF ADDON: CPT

## 2022-04-02 PROCEDURE — 80053 COMPREHEN METABOLIC PANEL: CPT | Performed by: PHYSICIAN ASSISTANT

## 2022-04-02 PROCEDURE — 36415 COLL VENOUS BLD VENIPUNCTURE: CPT | Performed by: PHYSICIAN ASSISTANT

## 2022-04-02 PROCEDURE — 99284 EMERGENCY DEPT VISIT MOD MDM: CPT

## 2022-04-02 PROCEDURE — 99285 EMERGENCY DEPT VISIT HI MDM: CPT | Performed by: PHYSICIAN ASSISTANT

## 2022-04-02 PROCEDURE — 83690 ASSAY OF LIPASE: CPT | Performed by: PHYSICIAN ASSISTANT

## 2022-04-02 RX ORDER — SODIUM PHOSPHATE, DIBASIC AND SODIUM PHOSPHATE, MONOBASIC 7; 19 G/133ML; G/133ML
1 ENEMA RECTAL ONCE
Status: COMPLETED | OUTPATIENT
Start: 2022-04-02 | End: 2022-04-02

## 2022-04-02 RX ADMIN — SODIUM CHLORIDE, POTASSIUM CHLORIDE, SODIUM LACTATE AND CALCIUM CHLORIDE 500 ML: 600; 310; 30; 20 INJECTION, SOLUTION INTRAVENOUS at 09:05

## 2022-04-02 RX ADMIN — IOHEXOL 100 ML: 350 INJECTION, SOLUTION INTRAVENOUS at 08:19

## 2022-04-02 RX ADMIN — SODIUM PHOSPHATE 1 ENEMA: 7; 19 ENEMA RECTAL at 10:10

## 2022-04-02 NOTE — ED PROVIDER NOTES
History  Chief Complaint   Patient presents with    Constipation     Pt c/o constipation x3 days  Took miralax and milk of mag this morning, and had a loose stool after but doesn't "feel like she's cleaned out"  Sarah Osuna is a 82-year-old female with past medical history including hypertension and diabetes arriving to the emergency department for evaluation with chief complaint of constipation  The patient states that she takes MiraLax daily and had been started on milk of magnesia by her primary care provider about a year and half ago though she struggles with having regular bowel movements  She states that her last regular bowel movement was about 3 days ago  She reports generalized abdominal discomfort and bloating/distension  She states that her pain is worsened while lying flat also stating that this seems to worsen her sleep apnea and as a result she has not slept well over the past couple of days due to her symptoms  She states that she had a small amount of "pasty" stool this morning while straining to have a bowel movement  She denies any melena or bright red blood per rectum  She admits to nausea without episodes of vomiting  She denies fevers or chills  Her past surgical history includes cholecystectomy  Patient states that she had a colonoscopy in 2018 and was told that there were no significant findings  She offers no other complaints or concerns at this time  Prior to Admission Medications   Prescriptions Last Dose Informant Patient Reported? Taking?    Accu-Chek FastClix Lancets MISC   Yes No   Sig: Use   Aspirin Buf,CaCarb-MgCarb-MgO, 81 MG TABS   Yes No   Sig: Take 81 mg by mouth   B Complex-Biotin-FA (HM Vitamin B100 Complex) TABS   Yes No   Sig: Take by mouth   Blood Glucose Monitoring Suppl (Accu-Chek Guide) w/Device KIT   Yes No   Sig: Use   Carboxymethylcellul-Glycerin (Refresh Optive) 0 5-0 9 % SOLN   Yes No   Sig: Apply to eye   Cholecalciferol 50 MCG (2000 UT) CAPS   Yes No   Sig: Take 2,000 Units by mouth   NovoLOG FlexPen 100 units/mL injection pen   Yes No   Si UNITS BEFORE MEALS   ISF 40/140 UP TP 75 UNITS PER DAY   Probiotic Product (Align) 4 MG CAPS   Yes No   Sig: Take 1 capsule by mouth   ammonium lactate (LAC-HYDRIN) 12 % cream   No No   Sig: Apply topically as needed for dry skin   ascorbic Acid (VITAMIN C) 500 MG CPCR   Yes No   Sig: capsule, extended release: 500 mg 1 capsule twice a day by mouth   benazepril (LOTENSIN) 10 mg tablet   No No   Sig: TAKE 1 TABLET BY MOUTH EVERY DAY   benzonatate (TESSALON PERLES) 100 mg capsule   No No   Sig: Take 1 capsule (100 mg total) by mouth 3 (three) times a day as needed for cough   cilostazol (PLETAL) 100 mg tablet   Yes No   Sig: Take 100 mg by mouth 2 (two) times a day   fluticasone (FLONASE) 50 mcg/act nasal spray   No No   Sig: SPRAY 1 SPRAY INTO EACH NOSTRIL EVERY DAY   furosemide (LASIX) 20 mg tablet   No No   Sig: Take 1 tablet (20 mg total) by mouth 2 (two) times a day   glipiZIDE (GLUCOTROL XL) 5 mg 24 hr tablet   Yes No   glucose blood (Accu-Chek Guide) test strip   Yes No   Sig: TEST 4 TIMES DAILY DX E11 65   glucose blood test strip  Self Yes No   Sig: Use 1 each daily as needed Use as instructed   ibuprofen (MOTRIN) 600 mg tablet   Yes No   Sig: Take 600 mg by mouth   insulin aspart (NovoLOG) 100 units/mL injection  Self Yes No   Sig: Inject under the skin   insulin degludec Reather Magyar FlexTouch) 100 units/mL injection pen  Self Yes No   Sig: Inject under the skin   metoprolol tartrate (LOPRESSOR) 25 mg tablet   No No   Sig: TAKE 1 TABLET BY MOUTH EVERY DAY   ondansetron (ZOFRAN-ODT) 4 mg disintegrating tablet   No No   Sig: Take 1 tablet (4 mg total) by mouth every 6 (six) hours as needed for nausea or vomiting   simethicone (MYLICON) 80 mg chewable tablet   Yes No   Sig: Chew 80 mg   simvastatin (ZOCOR) 40 mg tablet   No No   Sig: Take 1 tablet (40 mg total) by mouth every evening   sucralfate (CARAFATE) 1 g tablet   No No   Sig: TAKE 1 TABLET BY ORAL ROUTE 4 TIMES EVERY DAY ON AN EMPTY STOMACH 1 HOUR BEFORE MEALS AND AT BEDTIME   triamcinolone (KENALOG) 0 1 % cream   Yes No   Sig: Apply topically 2 (two) times a day   zolpidem (AMBIEN) 5 mg tablet  Self Yes No   Sig: Take 5 mg by mouth daily at bedtime as needed for sleep      Facility-Administered Medications: None       History reviewed  No pertinent past medical history  Past Surgical History:   Procedure Laterality Date    CARDIAC SURGERY         Family History   Problem Relation Age of Onset    Heart disease Mother     Diabetes Mother     Hypertension Mother     Heart disease Father     Hypertension Father     Diabetes Father      I have reviewed and agree with the history as documented  E-Cigarette/Vaping    E-Cigarette Use Never User      E-Cigarette/Vaping Substances    Nicotine No     THC No     CBD No     Flavoring No     Other No     Unknown No      Social History     Tobacco Use    Smoking status: Never Smoker    Smokeless tobacco: Never Used   Vaping Use    Vaping Use: Never used   Substance Use Topics    Alcohol use: Yes     Alcohol/week: 1 0 standard drink     Types: 1 Glasses of wine per week     Comment: rarely     Drug use: Never       Review of Systems   Constitutional: Negative for chills and fever  Gastrointestinal: Positive for abdominal pain, constipation and nausea  Negative for blood in stool  Psychiatric/Behavioral: The patient is nervous/anxious  All other systems reviewed and are negative  Physical Exam  Physical Exam  Vitals and nursing note reviewed  Constitutional:       General: She is not in acute distress  Appearance: Normal appearance  She is well-developed  She is obese  She is not ill-appearing, toxic-appearing or diaphoretic  HENT:      Head: Normocephalic and atraumatic        Right Ear: External ear normal       Left Ear: External ear normal    Eyes: Conjunctiva/sclera: Conjunctivae normal    Cardiovascular:      Rate and Rhythm: Normal rate and regular rhythm  Pulses: Normal pulses  Pulmonary:      Effort: Pulmonary effort is normal  No respiratory distress  Breath sounds: Normal breath sounds  No wheezing, rhonchi or rales  Chest:      Chest wall: No tenderness  Abdominal:      General: There is no distension  Palpations: Abdomen is soft  Tenderness: There is abdominal tenderness  There is no guarding or rebound  Comments: Abdomen soft and nondistended, with minimal generalized tenderness upon palpation  No peritoneal signs  Musculoskeletal:         General: Normal range of motion  Cervical back: Normal range of motion and neck supple  Skin:     General: Skin is warm and dry  Capillary Refill: Capillary refill takes less than 2 seconds  Coloration: Skin is not pale  Neurological:      Mental Status: She is alert  Motor: Motor function is intact  No weakness  Gait: Gait is intact           Vital Signs  ED Triage Vitals [04/02/22 0558]   Temperature Pulse Respirations Blood Pressure SpO2   98 °F (36 7 °C) 84 21 143/60 97 %      Temp Source Heart Rate Source Patient Position - Orthostatic VS BP Location FiO2 (%)   Oral Monitor Lying Left arm --      Pain Score       --           Vitals:    04/02/22 0558 04/02/22 0800   BP: 143/60 147/64   Pulse: 84 79   Patient Position - Orthostatic VS: Lying Lying         Visual Acuity      ED Medications  Medications   lactated ringers bolus 500 mL (0 mL Intravenous Stopped 4/2/22 1105)   iohexol (OMNIPAQUE) 350 MG/ML injection (SINGLE-DOSE) 100 mL (100 mL Intravenous Given 4/2/22 0819)   sodium phosphate-biphosphate (FLEET) enema 1 enema (1 enema Rectal Given 4/2/22 1010)       Diagnostic Studies  Results Reviewed     Procedure Component Value Units Date/Time    UA w Reflex to Microscopic w Reflex to Culture [[id number]] Collected: 04/02/22 0923    Lab Status: Final result Specimen: Urine, Clean Catch Updated: 04/02/22 0927     Color, UA Yellow     Clarity, UA Clear     Specific Gravity, UA 1 010     pH, UA 7 5     Leukocytes, UA Negative     Nitrite, UA Negative     Protein, UA Negative mg/dl      Glucose, UA Negative mg/dl      Ketones, UA Negative mg/dl      Urobilinogen, UA 0 2 E U /dl      Bilirubin, UA Negative     Blood, UA Negative    Comprehensive metabolic panel [[id number]]  (Abnormal) Collected: 04/02/22 0734    Lab Status: Final result Specimen: Blood from Arm, Left Updated: 04/02/22 0758     Sodium 137 mmol/L      Potassium 4 3 mmol/L      Chloride 103 mmol/L      CO2 28 mmol/L      ANION GAP 6 mmol/L      BUN 17 mg/dL      Creatinine 0 73 mg/dL      Glucose 154 mg/dL      Calcium 9 3 mg/dL      Corrected Calcium 9 8 mg/dL      AST 12 U/L      ALT 23 U/L      Alkaline Phosphatase 87 U/L      Total Protein 6 8 g/dL      Albumin 3 4 g/dL      Total Bilirubin 0 55 mg/dL      eGFR 78 ml/min/1 73sq m     Narrative:      [place] guidelines for Chronic Kidney Disease (CKD):     Stage 1 with normal or high GFR (GFR > 90 mL/min/1 73 square meters)    Stage 2 Mild CKD (GFR = 60-89 mL/min/1 73 square meters)    Stage 3A Moderate CKD (GFR = 45-59 mL/min/1 73 square meters)    Stage 3B Moderate CKD (GFR = 30-44 mL/min/1 73 square meters)    Stage 4 Severe CKD (GFR = 15-29 mL/min/1 73 square meters)    Stage 5 End Stage CKD (GFR <15 mL/min/1 73 square meters)  Note: GFR calculation is accurate only with a steady state creatinine    Lipase [[id number]]  (Abnormal) Collected: 04/02/22 0734    Lab Status: Final result Specimen: Blood from Arm, Left Updated: 04/02/22 0758     Lipase 62 u/L     CBC and differential [[id number]] Collected: 04/02/22 0734    Lab Status: Final result Specimen: Blood from Arm, Left Updated: 04/02/22 0744     WBC 8 14 Thousand/uL      RBC 4 59 Million/uL      Hemoglobin 14 1 g/dL      Hematocrit 42 3 %      MCV 92 fL      MCH 30 7 pg      MCHC 33 3 g/dL      RDW 14 0 %      MPV 10 5 fL      Platelets 363 Thousands/uL      nRBC 0 /100 WBCs      Neutrophils Relative 65 %      Immat GRANS % 1 %      Lymphocytes Relative 26 %      Monocytes Relative 7 %      Eosinophils Relative 1 %      Basophils Relative 0 %      Neutrophils Absolute 5 38 Thousands/µL      Immature Grans Absolute 0 04 Thousand/uL      Lymphocytes Absolute 2 11 Thousands/µL      Monocytes Absolute 0 54 Thousand/µL      Eosinophils Absolute 0 05 Thousand/µL      Basophils Absolute 0 02 Thousands/µL                  CT abdomen pelvis with contrast   Final Result by Birgit Gutierrez MD (04/02 9311)      Gas fluid levels in nondilated small bowel and colon may be sequela of recent laxative use  Differential includes  gastroenteritis and diarrhea illness in the appropriate clinical context  No significant amount of stool in the colon  Colonic diverticulosis  Workstation performed: [id number]                    Procedures  Procedures         ED Course  ED Course as of 04/02/22 2001   Sat Apr 02, 2022   0940 Spoke with REGINALD Keen AP on-call, recommending fleet enema and discharge with mag citrate  States that their office will reach out to the patient for follow up on Monday 1104 Patient states that she did have a bowel movement after fleet enema  Comfortable with discharge home  Identification of Seniors at Risk      Most Recent Value   (ISAR) Identification of Seniors at Risk    Before the illness or injury that brought you to the Emergency, did you need someone to help you on a regular basis? 0 Filed at: 04/02/2022 0602   In the last 24 hours, have you needed more help than usual? 0 Filed at: 04/02/2022 0602   Have you been hospitalized for one or more nights during the past 6 months?  0 Filed at: 04/02/2022 0602   In general, do you see well? 0 Filed at: 04/02/2022 0602   In general, do you have serious problems with your memory? 0 Filed at: 04/02/2022 0602   Do you take more than three different medications every day? 0 Filed at: 04/02/2022 0602   ISAR Score 0 Filed at: 04/02/2022 0602                      SBIRT 22yo+      Most Recent Value   SBIRT (23 yo +)    In order to provide better care to our patients, we are screening all of our patients for alcohol and drug use  Would it be okay to ask you these screening questions? Yes Filed at: 04/02/2022 9987   Initial Alcohol Screen: US AUDIT-C     1  How often do you have a drink containing alcohol? 0 Filed at: 04/02/2022 0603   2  How many drinks containing alcohol do you have on a typical day you are drinking? 0 Filed at: 04/02/2022 0603   3a  Male UNDER 65: How often do you have five or more drinks on one occasion? 0 Filed at: 04/02/2022 0603   3b  FEMALE Any Age, or MALE 65+: How often do you have 4 or more drinks on one occassion? 0 Filed at: 04/02/2022 0603   Audit-C Score 0 Filed at: 04/02/2022 5900   MONALISA: How many times in the past year have you    Used an illegal drug or used a prescription medication for non-medical reasons? Never Filed at: 04/02/2022 0603                    MDM  Number of Diagnoses or Management Options  Abdominal pain: new and requires workup  Diagnosis management comments: This is a 68-year-old female arriving to the emergency department for further evaluation with complaint of constipation  She admits that this is essentially a chronic problem for which she takes MiraLax as well as milk of magnesia daily  She states that she was previously on Dulcolax 4 times daily that this had caused severe abdominal cramping and was told to stop this medication by GI  She states she had 1 small bowel movement this morning prior to EMS arrival but proceeded with ED presentation due to her persistent discomfort  +nausea without vomiting      Differential diagnosis includes but is not limited to: gerd, enteritis, diverticulitis, colitis, constipation, obstruction, tumor, ibs, cystitis, uti    Initial ED plan: lab, imaging    Final ED Assessment: Vital signs stable on ED presentation, examination as above  All labs and imaging independently reviewed with imaging interpreted by the Radiologist   There were no significant lab findings, no renal impairment or electrolyte derangements  CT abdomen/pelvis reports no significant amount of stool in the colon  The case was discussed with Gastroenterology advanced practitioner, recommending Fleet enema in the department and discharge with script for magnesium citrate with anticipated outpatient GI follow-up early next week  The patient reports that she did experience a bowel movement here in the ED after Fleet enema was given  She was comfortable and agreeable with disposition plan as above  Return precautions discussed at length  The patient was discharged in stable condition and she had ambulated independently from the emergency department without issue  Amount and/or Complexity of Data Reviewed  Clinical lab tests: ordered and reviewed  Tests in the radiology section of CPT®: ordered and reviewed  Review and summarize past medical records: yes  Discuss the patient with other providers: yes (GI)  Independent visualization of images, tracings, or specimens: yes    Risk of Complications, Morbidity, and/or Mortality  Presenting problems: moderate  Diagnostic procedures: moderate  Management options: moderate    Patient Progress  Patient progress: stable      Disposition  Final diagnoses:   Abdominal pain     Time reflects when diagnosis was documented in both MDM as applicable and the Disposition within this note     Time User Action Codes Description Comment    4/2/2022 11:02 AM Malinda Correa Add [R10 9] Abdominal pain       ED Disposition     ED Disposition Condition Date/Time Comment    Discharge Stable Sat Apr 2, 2022 11:02 AM Eloisa Baldwin discharge to home/self care              Follow-up Information     Follow up With Specialties Details Why Contact Info Additional [id number] [facility], DO Family Medicine   [address] [facility] [phone] [id number] [facility]   [address]  [place] [id number] [address] [facility] [place], [address], [facility], [place], [phone]     [address] Emergency Department Emergency Medicine  If symptoms worsen [address] Emergency Department, [address] [place] [address], [id number]          Discharge Medication List as of 4/2/2022 11:04 AM      START taking these medications    Details   magnesium citrate solution Take 296 mL by mouth once for 1 dose, Starting Sat 4/2/2022, Normal         CONTINUE these medications which have NOT CHANGED    Details   Accu-Chek FastClix Lancets MISC Use, Historical Med      ammonium lactate (LAC-HYDRIN) 12 % cream Apply topically as needed for dry skin, Starting Fri 7/2/2021, Normal      ascorbic Acid (VITAMIN C) 500 MG CPCR capsule, extended release: 500 mg 1 capsule twice a day by mouth, Historical Med      Aspirin Buf,CaCarb-MgCarb-MgO, 81 MG TABS Take 81 mg by mouth, Historical Med      B Complex-Biotin-FA (HM Vitamin B100 Complex) TABS Take by mouth, Historical Med      benazepril (LOTENSIN) 10 mg tablet TAKE 1 TABLET BY MOUTH EVERY DAY, Normal      benzonatate (TESSALON PERLES) 100 mg capsule Take 1 capsule (100 mg total) by mouth 3 (three) times a day as needed for cough, Starting Fri 9/24/2021, Normal      Blood Glucose Monitoring Suppl (Accu-Chek Guide) w/Device KIT Use, Historical Med      Carboxymethylcellul-Glycerin (Refresh Optive) 0 5-0 9 % SOLN Apply to eye, Historical Med      Cholecalciferol 50 MCG (2000 UT) CAPS Take 2,000 Units by mouth, Historical Med      cilostazol (PLETAL) 100 mg tablet Take 100 mg by mouth 2 (two) times a day, Starting Tue 3/2/2021, Historical Med      fluticasone (FLONASE) 50 mcg/act nasal spray SPRAY 1 SPRAY INTO EACH NOSTRIL EVERY DAY, Normal      furosemide (LASIX) 20 mg tablet Take 1 tablet (20 mg total) by mouth 2 (two) times a day, Starting Tue 3/15/2022, Until Thu 4/14/2022, Normal      glipiZIDE (GLUCOTROL XL) 5 mg 24 hr tablet Starting Wed 6/16/2021, Historical Med      !! glucose blood (Accu-Chek Guide) test strip TEST 4 TIMES DAILY DX E11 65, Historical Med      !! glucose blood test strip Use 1 each daily as needed Use as instructed, Historical Med      ibuprofen (MOTRIN) 600 mg tablet Take 600 mg by mouth, Historical Med      insulin aspart (NovoLOG) 100 units/mL injection Inject under the skin, Historical Med      insulin degludec Re Arrow FlexTouch) 100 units/mL injection pen Inject under the skin, Historical Med      metoprolol tartrate (LOPRESSOR) 25 mg tablet TAKE 1 TABLET BY MOUTH EVERY DAY, Normal      NovoLOG FlexPen 100 units/mL injection pen 18 UNITS BEFORE MEALS   ISF 40/140 UP TP 75 UNITS PER DAY, Historical Med      ondansetron (ZOFRAN-ODT) 4 mg disintegrating tablet Take 1 tablet (4 mg total) by mouth every 6 (six) hours as needed for nausea or vomiting, Starting Tue 6/29/2021, Normal      Probiotic Product (Align) 4 MG CAPS Take 1 capsule by mouth, Historical Med      simethicone (MYLICON) 80 mg chewable tablet Chew 80 mg, Historical Med      simvastatin (ZOCOR) 40 mg tablet Take 1 tablet (40 mg total) by mouth every evening, Starting Thu 2/24/2022, Until Sat 3/26/2022, Normal      sucralfate (CARAFATE) 1 g tablet TAKE 1 TABLET BY ORAL ROUTE 4 TIMES EVERY DAY ON AN EMPTY STOMACH 1 HOUR BEFORE MEALS AND AT BEDTIME, Normal      triamcinolone (KENALOG) 0 1 % cream Apply topically 2 (two) times a day, Historical Med      zolpidem (AMBIEN) 5 mg tablet Take 5 mg by mouth daily at bedtime as needed for sleep, Historical Med       !! - Potential duplicate medications found  Please discuss with provider  No discharge procedures on file      PDMP Review     None          ED Provider  Electronically Signed by           Amelia Ortiz PA-C  04/02/22 2008

## 2022-04-02 NOTE — DISCHARGE INSTRUCTIONS
Please take magnesium citrate as directed  Follow up with GI early next week for re-evaluation  Return immediately to the ED with any new or worsening symptoms as discussed

## 2022-04-07 ENCOUNTER — APPOINTMENT (OUTPATIENT)
Dept: LAB | Facility: CLINIC | Age: 82
End: 2022-04-07
Payer: MEDICARE

## 2022-04-07 ENCOUNTER — OFFICE VISIT (OUTPATIENT)
Dept: INTERNAL MEDICINE CLINIC | Facility: CLINIC | Age: 82
End: 2022-04-07
Payer: MEDICARE

## 2022-04-07 VITALS
RESPIRATION RATE: 18 BRPM | TEMPERATURE: 97.6 F | HEIGHT: 61 IN | WEIGHT: 224 LBS | SYSTOLIC BLOOD PRESSURE: 138 MMHG | BODY MASS INDEX: 42.29 KG/M2 | DIASTOLIC BLOOD PRESSURE: 62 MMHG | HEART RATE: 64 BPM | OXYGEN SATURATION: 97 %

## 2022-04-07 DIAGNOSIS — J34.89 RHINORRHEA: Primary | ICD-10-CM

## 2022-04-07 DIAGNOSIS — E11.9 TYPE 2 DIABETES MELLITUS WITHOUT COMPLICATION, WITH LONG-TERM CURRENT USE OF INSULIN (HCC): ICD-10-CM

## 2022-04-07 DIAGNOSIS — Z78.0 ENCOUNTER FOR OSTEOPOROSIS SCREENING IN ASYMPTOMATIC POSTMENOPAUSAL PATIENT: ICD-10-CM

## 2022-04-07 DIAGNOSIS — Z79.4 TYPE 2 DIABETES MELLITUS WITHOUT COMPLICATION, WITH LONG-TERM CURRENT USE OF INSULIN (HCC): ICD-10-CM

## 2022-04-07 DIAGNOSIS — Z13.29 SCREENING FOR THYROID DISORDER: ICD-10-CM

## 2022-04-07 DIAGNOSIS — Z13.820 ENCOUNTER FOR OSTEOPOROSIS SCREENING IN ASYMPTOMATIC POSTMENOPAUSAL PATIENT: ICD-10-CM

## 2022-04-07 DIAGNOSIS — E55.9 VITAMIN D DEFICIENCY: ICD-10-CM

## 2022-04-07 DIAGNOSIS — K59.09 CHRONIC CONSTIPATION: ICD-10-CM

## 2022-04-07 DIAGNOSIS — R09.82 POST-NASAL DRIP: ICD-10-CM

## 2022-04-07 LAB
25(OH)D3 SERPL-MCNC: 41.4 NG/ML (ref 30–100)
EST. AVERAGE GLUCOSE BLD GHB EST-MCNC: 174 MG/DL
HBA1C MFR BLD: 7.7 %
TSH SERPL DL<=0.05 MIU/L-ACNC: 1.05 UIU/ML (ref 0.45–4.5)

## 2022-04-07 PROCEDURE — 84443 ASSAY THYROID STIM HORMONE: CPT

## 2022-04-07 PROCEDURE — 36415 COLL VENOUS BLD VENIPUNCTURE: CPT

## 2022-04-07 PROCEDURE — 99215 OFFICE O/P EST HI 40 MIN: CPT

## 2022-04-07 PROCEDURE — 82306 VITAMIN D 25 HYDROXY: CPT

## 2022-04-07 PROCEDURE — 83036 HEMOGLOBIN GLYCOSYLATED A1C: CPT

## 2022-04-07 RX ORDER — FLUTICASONE PROPIONATE 50 MCG
1 SPRAY, SUSPENSION (ML) NASAL
Qty: 16 ML | Refills: 1 | Status: SHIPPED | OUTPATIENT
Start: 2022-04-07 | End: 2022-04-30

## 2022-04-07 RX ORDER — MONTELUKAST SODIUM 10 MG/1
10 TABLET ORAL
Qty: 30 TABLET | Refills: 5 | Status: SHIPPED | OUTPATIENT
Start: 2022-04-07

## 2022-04-07 NOTE — PROGRESS NOTES
Mo    NAME: Anola Barthel  AGE: 80 y o  SEX: female  : 1940     DATE: 2022     Assessment and Plan:     Problem List Items Addressed This Visit        Digestive    Chronic constipation     Patient encouraged to continue using MiraLax twice a day and the milk of magnesium as needed  I cautioned her that magnesium citrate may cause  cramping  I encouraged her to increase the fiber in her diet we reviewed foods that are high in fiber  Encouraged her to drink adequate water as well as increase her movement  Relevant Orders    TSH, 3rd generation with Free T4 reflex (Completed)       Endocrine    Type 2 diabetes mellitus without complication, with long-term current use of insulin [facility])       She is followed by endocrinology at [facility]  She saw them yesterday  Today we reviewed low-carbohydrate diets  Relevant Orders    Hemoglobin A1C ([facility]) (Completed)       Other    Post-nasal drip     She reports that she had a postnasal drip which is chronic however worsened and making it difficult to sleep  She does not like nasal sprays  I did encourage her to use Flonase and discussed proper administration of Flonase  Counseled her on getting a small air purifier for her bedroom  Will add Singulair to see if this improves allergy symptoms  Other Visit Diagnoses     Rhinorrhea    -  Primary    Relevant Medications    montelukast (SINGULAIR) 10 mg tablet    fluticasone (FLONASE) 50 mcg/act nasal spray    Vitamin D deficiency        Relevant Orders    Vitamin D 25 hydroxy (Completed)    Encounter for osteoporosis screening in asymptomatic postmenopausal patient        Relevant Orders    DXA bone density spine hip and pelvis    Screening for thyroid disorder        Relevant Orders    TSH, 3rd generation with Free T4 reflex (Completed)              No follow-ups on file       Chief Complaint:     Chief Complaint Patient presents with    Follow-up     allergies are persisting, left foot discomfort, pain in knee and lower back    Constipation     last saturday        History of Present Illness: Rosa Amador presents to the office today for follow-up appointment related constipation  She was recently in the emergency department for abdominal pain related to constipation  She was given a fleets enema there with results  Discharge with magnesium citrate which she has not taking it  She states that she uses MiraLax twice a day  And was instructed by Dr Rip Perdomo to use milk of magnesium if needed  She reports being laxative dependent to evacuate her bowels  She denies nausea or vomiting, abdominal pain today  She also reports postnasal drip congestion which has been chronic, related to her allergies  Review of Systems:     Review of Systems   Constitutional: Negative  HENT: Positive for congestion, postnasal drip and rhinorrhea  Respiratory: Negative  Cardiovascular: Negative  Gastrointestinal: Positive for constipation  Musculoskeletal: Positive for gait problem and myalgias  Psychiatric/Behavioral: Negative  Problem List:     Patient Active Problem List   Diagnosis    PVD (peripheral vascular disease) ([facility] [address] )    Body mass index (BMI) of 40 0 to 44 9 in adult [facility])    Coronary artery disease involving coronary bypass graft of native heart without angina pectoris    Type 2 diabetes mellitus without complication, with long-term current use of insulin ([facility])    Essential hypertension    Chronic constipation        Objective:     /62 (BP Location: Left arm, Patient Position: Sitting, Cuff Size: Standard)   Pulse 64   Temp 97 6 °F (36 4 °C) (Tympanic)   Resp 18   Ht 5' 1" (1 549 m)   Wt 102 kg (224 lb)   SpO2 97%   BMI 42 32 kg/m²     Physical Exam  Vitals and nursing note reviewed  Constitutional:       General: She is not in acute distress       Appearance: She is well-developed  She is obese  HENT:      Head: Normocephalic and atraumatic  Right Ear: Tympanic membrane normal       Left Ear: Tympanic membrane normal       Nose: Nose normal  No congestion or rhinorrhea  Mouth/Throat:      Mouth: Mucous membranes are moist       Pharynx: Oropharynx is clear  Eyes:      Conjunctiva/sclera: Conjunctivae normal       Pupils: Pupils are equal, round, and reactive to light  Neck:      Thyroid: No thyromegaly  Cardiovascular:      Rate and Rhythm: Normal rate and regular rhythm  Pulses: Normal pulses  Heart sounds: Normal heart sounds  No murmur heard  Pulmonary:      Effort: Pulmonary effort is normal  No respiratory distress  Breath sounds: Normal breath sounds  Abdominal:      General: Bowel sounds are normal       Palpations: Abdomen is soft  Tenderness: There is no abdominal tenderness  Musculoskeletal:         General: Normal range of motion  Cervical back: Normal range of motion and neck supple  Lymphadenopathy:      Cervical: No cervical adenopathy  Skin:     General: Skin is warm and dry  Capillary Refill: Capillary refill takes less than 2 seconds  Neurological:      General: No focal deficit present  Mental Status: She is alert and oriented to person, place, and time  Psychiatric:         Mood and Affect: Mood normal          Behavior: Behavior normal          Thought Content: Thought content normal          Judgment: Judgment normal          I spent 30 minutes with this patient      [address] [place] [address]  [facility]

## 2022-04-07 NOTE — PATIENT INSTRUCTIONS
Flonase 1spray each nostril at bedtime as discussed  May take Milk of Magnesium 30ml daily  High Fiber foods and Water        High Fiber Diet   WHAT YOU NEED TO KNOW:   A high-fiber diet includes foods that have a high amount of fiber  Fiber is the part of fruits, vegetables, and grains that is not broken down by your body  Fiber keeps your bowel movements regular  Fiber can also help lower your cholesterol level, control blood sugar in people with diabetes, and relieve constipation  Fiber can also help you control your weight because it helps you feel full faster  Most adults should eat 25 to 35 grams of fiber each day  Talk to your dietitian or healthcare provider about the amount of fiber you need  DISCHARGE INSTRUCTIONS:   Good sources of fiber:       · Foods with at least 4 grams of fiber per serving:      ? ? to ½ cup of high-fiber cereal (check the nutrition label on the box)    ? ½ cup of blackberries or raspberries    ? 4 dried prunes    ? 1 cooked artichoke    ? ½ cup of cooked legumes, such as lentils, or red, kidney, and quinones beans    · Foods with 1 to 3 grams of fiber per serving:      ? 1 slice of whole-wheat, pumpernickel, or rye bread    ? ½ cup of cooked brown rice    ? 4 whole-wheat crackers    ? 1 cup of oatmeal    ? ½ cup of cereal with 1 to 3 grams of fiber per serving (check the nutrition label on the box)    ? 1 small piece of fruit, such as an apple, banana, pear, kiwi, or orange    ? 3 dates    ? ½ cup of canned apricots, fruit cocktail, peaches, or pears    ? ½ cup of raw or cooked vegetables, such as carrots, cauliflower, cabbage, spinach, squash, or corn    Ways that you can increase fiber in your diet:   · Choose brown or wild rice instead of white rice  · Use whole wheat flour in recipes instead of white or all-purpose flour  · Add beans and peas to casseroles or soups  · Choose fresh fruit and vegetables with peels or skins on instead of juices      Other diet guidelines to follow:   · Add fiber to your diet slowly  You may have abdominal discomfort, bloating, and gas if you add fiber to your diet too quickly  · Drink plenty of liquids as you add fiber to your diet  You may have nausea or develop constipation if you do not drink enough water  Ask how much liquid to drink each day and which liquids are best for you  © Copyright [employer] 2022 Information is for End User's use only and may not be sold, redistributed or otherwise used for commercial purposes  All illustrations and images included in CareNotes® are the copyrighted property of [employer]  or [address] [place], [place] [address]mildred   The above information is an  only  It is not intended as medical advice for individual conditions or treatments  Talk to your doctor, nurse or pharmacist before following any medical regimen to see if it is safe and effective for you

## 2022-04-10 PROBLEM — R09.82 POST-NASAL DRIP: Status: ACTIVE | Noted: 2022-04-10

## 2022-04-10 NOTE — ASSESSMENT & PLAN NOTE
Patient encouraged to continue using MiraLax twice a day and the milk of magnesium as needed  I cautioned her that magnesium citrate may cause  cramping  I encouraged her to increase the fiber in her diet we reviewed foods that are high in fiber  Encouraged her to drink adequate water as well as increase her movement

## 2022-04-10 NOTE — ASSESSMENT & PLAN NOTE
She is followed by endocrinology at [facility]  She saw them yesterday  Today we reviewed low-carbohydrate diets

## 2022-04-10 NOTE — ASSESSMENT & PLAN NOTE
She reports that she had a postnasal drip which is chronic however worsened and making it difficult to sleep  She does not like nasal sprays  I did encourage her to use Flonase and discussed proper administration of Flonase  Counseled her on getting a small air purifier for her bedroom  Will add Singulair to see if this improves allergy symptoms

## 2022-04-12 ENCOUNTER — TELEPHONE (OUTPATIENT)
Dept: INTERNAL MEDICINE CLINIC | Facility: CLINIC | Age: 82
End: 2022-04-12

## 2022-04-12 NOTE — TELEPHONE ENCOUNTER
----- Message from [address] sent at 4/12/2022  1:16 PM EDT -----  Please call the patient regarding her abnormal result  Please let patient know her hemoglobin A1c is 7 7 which is slightly higher than her previous 1  She wants to watch the carbohydrates as we discussed in the appointment  If she has any other questions she can call for a follow-up appointment

## 2022-04-30 DIAGNOSIS — J34.89 RHINORRHEA: ICD-10-CM

## 2022-04-30 RX ORDER — FLUTICASONE PROPIONATE 50 MCG
1 SPRAY, SUSPENSION (ML) NASAL
Qty: 16 ML | Refills: 1 | Status: SHIPPED | OUTPATIENT
Start: 2022-04-30

## 2022-06-11 DIAGNOSIS — I10 ESSENTIAL HYPERTENSION: ICD-10-CM

## 2022-06-11 RX ORDER — FUROSEMIDE 20 MG/1
TABLET ORAL
Qty: 180 TABLET | Refills: 0 | Status: SHIPPED | OUTPATIENT
Start: 2022-06-11

## 2022-07-02 DIAGNOSIS — E11.9 TYPE 2 DIABETES MELLITUS WITHOUT COMPLICATION, WITH LONG-TERM CURRENT USE OF INSULIN (HCC): ICD-10-CM

## 2022-07-02 DIAGNOSIS — Z79.4 TYPE 2 DIABETES MELLITUS WITHOUT COMPLICATION, WITH LONG-TERM CURRENT USE OF INSULIN (HCC): ICD-10-CM

## 2022-07-02 RX ORDER — SIMVASTATIN 40 MG
TABLET ORAL
Qty: 30 TABLET | Refills: 0 | Status: SHIPPED | OUTPATIENT
Start: 2022-07-02 | End: 2022-08-12 | Stop reason: SDUPTHER

## 2022-08-02 ENCOUNTER — RA CDI HCC (OUTPATIENT)
Dept: OTHER | Facility: HOSPITAL | Age: 82
End: 2022-08-02

## 2022-08-02 NOTE — PROGRESS NOTES
E11 51, [id number] [id number]  [facility]  coding opportunities          Chart Reviewed number of suggestions sent to Provider: 2     Patients Insurance     Medicare Insurance: [employer]

## 2022-08-12 DIAGNOSIS — J34.89 RHINORRHEA: ICD-10-CM

## 2022-08-12 DIAGNOSIS — E11.9 TYPE 2 DIABETES MELLITUS WITHOUT COMPLICATION, WITH LONG-TERM CURRENT USE OF INSULIN (HCC): ICD-10-CM

## 2022-08-12 DIAGNOSIS — Z79.4 TYPE 2 DIABETES MELLITUS WITHOUT COMPLICATION, WITH LONG-TERM CURRENT USE OF INSULIN (HCC): ICD-10-CM

## 2022-08-12 NOTE — TELEPHONE ENCOUNTER
Medication Refill Request     Name montelukast (SINGULAIR) 10 mg  Dose/Frequency 1 tab daily at bedtime  Quantity 30  Verified pharmacy   [x]  Verified ordering Provider   [x]  Does patient have enough for the next 3 days? Yes [] No [x]    Medication Refill Request     Name simvastatin (ZOCOR) 40 mg  Dose/Frequency 1 tab daily   Quantity 90   Verified pharmacy   [x]  Verified ordering Provider   [x]  Does patient have enough for the next 3 days?  Yes [] No [x]

## 2022-08-13 RX ORDER — SIMVASTATIN 40 MG
40 TABLET ORAL EVERY EVENING
Qty: 30 TABLET | Refills: 3 | Status: SHIPPED | OUTPATIENT
Start: 2022-08-13 | End: 2022-09-07

## 2022-08-13 RX ORDER — MONTELUKAST SODIUM 10 MG/1
10 TABLET ORAL
Qty: 90 TABLET | Refills: 1 | Status: SHIPPED | OUTPATIENT
Start: 2022-08-13 | End: 2022-11-11

## 2022-08-16 ENCOUNTER — TELEMEDICINE (OUTPATIENT)
Dept: INTERNAL MEDICINE CLINIC | Facility: CLINIC | Age: 82
End: 2022-08-16
Payer: MEDICARE

## 2022-08-16 DIAGNOSIS — K59.09 CHRONIC CONSTIPATION: Primary | ICD-10-CM

## 2022-08-16 DIAGNOSIS — R42 VERTIGO: ICD-10-CM

## 2022-08-16 DIAGNOSIS — J31.0 CHRONIC RHINITIS: ICD-10-CM

## 2022-08-16 DIAGNOSIS — R09.82 POST-NASAL DRIP: ICD-10-CM

## 2022-08-16 DIAGNOSIS — R29.898 WEAKNESS OF BOTH LOWER EXTREMITIES: ICD-10-CM

## 2022-08-16 PROCEDURE — 99214 OFFICE O/P EST MOD 30 MIN: CPT | Performed by: FAMILY MEDICINE

## 2022-08-16 RX ORDER — LACTULOSE 20 G/30ML
20 SOLUTION ORAL 2 TIMES DAILY
Qty: 1800 ML | Refills: 0 | Status: ON HOLD | OUTPATIENT
Start: 2022-08-16 | End: 2022-09-09

## 2022-08-16 RX ORDER — BENZONATATE 200 MG/1
200 CAPSULE ORAL 3 TIMES DAILY PRN
Qty: 20 CAPSULE | Refills: 0 | Status: SHIPPED | OUTPATIENT
Start: 2022-08-16 | End: 2022-09-19

## 2022-08-16 RX ORDER — MECLIZINE HYDROCHLORIDE 25 MG/1
25 TABLET ORAL EVERY 12 HOURS PRN
Qty: 30 TABLET | Refills: 0 | Status: SHIPPED | OUTPATIENT
Start: 2022-08-16 | End: 2022-09-07

## 2022-08-16 NOTE — PROGRESS NOTES
Virtual Regular Visit    Verification of patient location:    Patient is located in the following state in which I hold an active license [place]      Assessment/Plan:    Problem List Items Addressed This Visit        Digestive    Chronic constipation - Primary    Relevant Medications    lactulose 20 g/30 mL    Other Relevant Orders    Ambulatory Referral to Gastroenterology       Other    Post-nasal drip    Relevant Medications    benzonatate (TESSALON) 200 MG capsule      Other Visit Diagnoses     Weakness of both lower extremities        Relevant Orders    EXTERNAL Referral to [address] [place] [address] [address]    Chronic rhinitis        Relevant Orders    Ambulatory Referral to Otolaryngology    Vertigo        Relevant Medications    meclizine (ANTIVERT) 25 mg tablet        Will trial lactulose for constipation  Refill provided  Will trial tesslaon perle, sudafed and claritin for chronic rhinitis  ENT referral placed for further evaluation  Reason for visit is   Chief Complaint   Patient presents with    Virtual Regular Visit        Encounter provider Marty Cruz DO    Provider located at [address] [place] [address]      Recent Visits  No visits were found meeting these conditions  Showing recent visits within past 7 days and meeting all other requirements  Today's Visits  Date Type Provider Dept   08/16/22 Telemedicine Jarred Raza today's visits and meeting all other requirements  Future Appointments  No visits were found meeting these conditions  Showing future appointments within next 150 days and meeting all other requirements       The patient was identified by name and date of birth  Liz Melissa was informed that this is a telemedicine visit and that the visit is being conducted through [facility] and patient was informed this is a secure, HIPAA-complaint platform   She agrees to proceed     My office door was closed  No one else was in the room  She acknowledged consent and understanding of privacy and security of the video platform  The patient has agreed to participate and understands they can discontinue the visit at any time  Patient is aware this is a billable service  Hellen Joshua is a 80 y o  female       Pt presents for f/u  Constipation isn't improved  Using miralax and milk of magnesia  Rhinorrea, PND and congestion is worsening despite Flonase, Singulair and Claritin use  Has been dealing with some lower ext  Weakness  Would like PT  Unable to leave the home due ot family care obligations  Desires to have [facility] come to her home for PT  Requesting refill of meclizine        History reviewed  No pertinent past medical history      Past Surgical History:   Procedure Laterality Date    CARDIAC SURGERY         Current Outpatient Medications   Medication Sig Dispense Refill    benzonatate (TESSALON) 200 MG capsule Take 1 capsule (200 mg total) by mouth 3 (three) times a day as needed for cough 20 capsule 0    lactulose 20 g/30 mL Take 30 mL (20 g total) by mouth 2 (two) times a day 1800 mL 0    meclizine (ANTIVERT) 25 mg tablet Take 1 tablet (25 mg total) by mouth every 12 (twelve) hours as needed for dizziness 30 tablet 0    Accu-Chek FastClix Lancets MISC Use      ammonium lactate (LAC-HYDRIN) 12 % cream Apply topically as needed for dry skin (Patient not taking: Reported on 4/7/2022 ) 385 g 0    ascorbic Acid (VITAMIN C) 500 MG CPCR capsule, extended release: 500 mg 1 capsule twice a day by mouth      Aspirin Buf,CaCarb-MgCarb-MgO, 81 MG TABS Take 81 mg by mouth      B Complex-Biotin-FA (HM Vitamin B100 Complex) TABS Take by mouth      benazepril (LOTENSIN) 10 mg tablet TAKE 1 TABLET BY MOUTH EVERY DAY 90 tablet 1    Blood Glucose Monitoring Suppl (Accu-Chek Guide) w/Device KIT Use      Carboxymethylcellul-Glycerin (Refresh Optive) 0 5-0 9 % SOLN Apply to eye      Cholecalciferol 50 MCG (2000 UT) CAPS Take 2,000 Units by mouth (Patient not taking: Reported on 4/7/2022 )      cilostazol (PLETAL) 100 mg tablet Take 100 mg by mouth 2 (two) times a day      fluticasone (FLONASE) 50 mcg/act nasal spray 1 SPRAY INTO EACH NOSTRIL DAILY AT BEDTIME 16 mL 1    furosemide (LASIX) 20 mg tablet TAKE 1 TABLET BY MOUTH TWICE A  tablet 0    glipiZIDE (GLUCOTROL XL) 5 mg 24 hr tablet       glucose blood (Accu-Chek Guide) test strip TEST 4 TIMES DAILY DX E11 65      glucose blood test strip Use 1 each daily as needed Use as instructed      ibuprofen (MOTRIN) 600 mg tablet Take 600 mg by mouth      insulin aspart (NovoLOG) 100 units/mL injection Inject under the skin      insulin degludec Lady Contras FlexTouch) 100 units/mL injection pen Inject under the skin      magnesium citrate solution Take 296 mL by mouth once for 1 dose 296 mL 0    metoprolol tartrate (LOPRESSOR) 25 mg tablet TAKE 1 TABLET BY MOUTH EVERY DAY 90 tablet 5    montelukast (SINGULAIR) 10 mg tablet Take 1 tablet (10 mg total) by mouth daily at bedtime 90 tablet 1    NovoLOG FlexPen 100 units/mL injection pen 18 UNITS BEFORE MEALS   ISF 40/140 UP TP 75 UNITS PER DAY      ondansetron (ZOFRAN-ODT) 4 mg disintegrating tablet Take 1 tablet (4 mg total) by mouth every 6 (six) hours as needed for nausea or vomiting 20 tablet 0    Probiotic Product (Align) 4 MG CAPS Take 1 capsule by mouth      simethicone (MYLICON) 80 mg chewable tablet Chew 80 mg      simvastatin (ZOCOR) 40 mg tablet Take 1 tablet (40 mg total) by mouth every evening 30 tablet 3    sucralfate (CARAFATE) 1 g tablet TAKE 1 TABLET BY ORAL ROUTE 4 TIMES EVERY DAY ON AN EMPTY STOMACH 1 HOUR BEFORE MEALS AND AT BEDTIME 360 tablet 5    triamcinolone (KENALOG) 0 1 % cream Apply topically 2 (two) times a day      zolpidem (AMBIEN) 5 mg tablet Take 5 mg by mouth daily at bedtime as needed for sleep (Patient not taking: Reported on 4/7/2022 )       No current facility-administered medications for this visit  Allergies   Allergen Reactions    Keflex [Cephalexin] Hives    Penicillins Rash    Atorvastatin Other (See Comments)     Other reaction(s): Muscle pain  Other reaction(s): Muscle pain      Metformin Diarrhea     Severe diarrhea  Severe diarrhea         Review of Systems   Constitutional: Positive for fatigue  HENT: Positive for congestion, postnasal drip and rhinorrhea  Respiratory: Negative for shortness of breath  Cardiovascular: Negative for chest pain  Video Exam    There were no vitals filed for this visit  Physical Exam  HENT:      Head: Normocephalic and atraumatic  Pulmonary:      Effort: Pulmonary effort is normal    Neurological:      Mental Status: She is alert and oriented to person, place, and time     Psychiatric:         Mood and Affect: Mood normal          Behavior: Behavior normal           I spent 18 minutes directly with the patient during this visit

## 2022-08-26 RX ORDER — POLYETHYLENE GLYCOL 3350 17 G/17G
17 POWDER, FOR SOLUTION ORAL DAILY
COMMUNITY

## 2022-08-29 ENCOUNTER — OFFICE VISIT (OUTPATIENT)
Dept: GASTROENTEROLOGY | Facility: CLINIC | Age: 82
End: 2022-08-29
Payer: MEDICARE

## 2022-08-29 VITALS
HEIGHT: 61 IN | HEART RATE: 96 BPM | DIASTOLIC BLOOD PRESSURE: 80 MMHG | BODY MASS INDEX: 42.52 KG/M2 | WEIGHT: 225.2 LBS | SYSTOLIC BLOOD PRESSURE: 120 MMHG | OXYGEN SATURATION: 99 %

## 2022-08-29 DIAGNOSIS — K59.09 CHRONIC CONSTIPATION: ICD-10-CM

## 2022-08-29 PROCEDURE — 99204 OFFICE O/P NEW MOD 45 MIN: CPT | Performed by: INTERNAL MEDICINE

## 2022-08-29 NOTE — PROGRESS NOTES
[facility] - Outpatient Consultation  Perla Levine 80 y o  female MRN: [id number]  Encounter: [id number]          ASSESSMENT AND PLAN:      1  Chronic constipation  - Ambulatory Referral to Gastroenterology  - Colonoscopy; Future    Miralax bid  MOM at bedtime  Fiber supplement bid  Increased fiber intake  Continue with the Align daily    Hold off on Linzess    ______________________________________________________________________    HPI:  This 68-year-old female comes the office today complaining of chronic constipation  His constipation problems been going on since 2017  She was previously being followed by gastroenterologist up in the [place] area  Her current regimen consists of MiraLax which she is taking twice daily as well as milk of magnesia as needed  She states she was given a trial of Amitiza however this cause daily diarrhea and she had to stop this medication  She is eating some fiber foods  She states that she is taking a fiber tablet in the morning time  She is also taking align on a daily basis  She has a lot of gas and bloating  She has been taking sucralfate anywhere from twice a day to 4 times a day  She can burp and she feels bloating but she states she cannot pass gas from below  Her last colonoscopy was in 2018 it was described as being normal   There is no family history for colon cancer  She was originally taking MiraLax 3 times a day and her primary care physician advised she goes to twice a day      REVIEW OF SYSTEMS:    CONSTITUTIONAL: Denies any fever, chills, rigors, and weight loss  HEENT: No earache or tinnitus  Denies hearing loss or visual disturbances  CARDIOVASCULAR: No chest pain or palpitations  RESPIRATORY: Denies any cough, hemoptysis, shortness of breath or dyspnea on exertion  GASTROINTESTINAL: As noted in the History of Present Illness  GENITOURINARY: No problems with urination   Denies any hematuria or dysuria  NEUROLOGIC: No dizziness or vertigo, denies headaches  MUSCULOSKELETAL: Denies any muscle or joint pain  SKIN: Denies skin rashes or itching  ENDOCRINE: Denies excessive thirst  Denies intolerance to heat or cold  PSYCHOSOCIAL: Denies depression or anxiety  Denies any recent memory loss         Historical Information   Past Medical History:   Diagnosis Date    Diabetes mellitus ([facility] [address] )     Hyperlipidemia     Hypertension      Past Surgical History:   Procedure Laterality Date    CARDIAC SURGERY       Social History   Social History     Substance and Sexual Activity   Alcohol Use Yes    Alcohol/week: 1 0 standard drink    Types: 1 Glasses of wine per week    Comment: rarely      Social History     Substance and Sexual Activity   Drug Use Never     Social History     Tobacco Use   Smoking Status Never Smoker   Smokeless Tobacco Never Used     Family History   Problem Relation Age of Onset    Heart disease Mother     Diabetes Mother     Hypertension Mother     Heart disease Father     Hypertension Father     Diabetes Father        Meds/Allergies       Current Outpatient Medications:     Accu-Chek FastClix Lancets MISC    ascorbic Acid (VITAMIN C) 500 MG CPCR    Aspirin Buf,CaCarb-MgCarb-MgO, 81 MG TABS    B Complex-Biotin-FA (HM Vitamin B100 Complex) TABS    benazepril (LOTENSIN) 10 mg tablet    benzonatate (TESSALON) 200 MG capsule    Blood Glucose Monitoring Suppl (Accu-Chek Guide) w/Device KIT    Cholecalciferol 50 MCG (2000 UT) CAPS    cilostazol (PLETAL) 100 mg tablet    fluticasone (FLONASE) 50 mcg/act nasal spray    glipiZIDE (GLUCOTROL XL) 5 mg 24 hr tablet    glucose blood (Accu-Chek Guide) test strip    glucose blood test strip    ibuprofen (MOTRIN) 600 mg tablet    insulin aspart (NovoLOG) 100 units/mL injection    insulin degludec Kenny Alvarado FlexTouch) 100 units/mL injection pen    lactulose 20 g/30 mL    magnesium hydroxide (MILK OF MAGNESIA) 400 mg/5 mL oral suspension    metoprolol tartrate (LOPRESSOR) 25 mg tablet    montelukast (SINGULAIR) 10 mg tablet    NovoLOG FlexPen 100 units/mL injection pen    ondansetron (ZOFRAN-ODT) 4 mg disintegrating tablet    polyethylene glycol (GLYCOLAX) 17 GM/SCOOP powder    Probiotic Product (Align) 4 MG CAPS    simethicone (MYLICON) 80 mg chewable tablet    simvastatin (ZOCOR) 40 mg tablet    sucralfate (CARAFATE) 1 g tablet    triamcinolone (KENALOG) 0 1 % cream    zolpidem (AMBIEN) 5 mg tablet    ammonium lactate (LAC-HYDRIN) 12 % cream    Carboxymethylcellul-Glycerin (Refresh Optive) 0 5-0 9 % SOLN    furosemide (LASIX) 20 mg tablet    magnesium citrate solution    meclizine (ANTIVERT) 25 mg tablet    Allergies   Allergen Reactions    Keflex [Cephalexin] Hives    Penicillins Rash    Atorvastatin Other (See Comments)     Other reaction(s): Muscle pain  Other reaction(s): Muscle pain      Metformin Diarrhea     Severe diarrhea  Severe diarrhea             Objective     Blood pressure 120/80, pulse 96, height 5' 1" (1 549 m), weight 102 kg (225 lb 3 2 oz), SpO2 99 %  Body mass index is 42 55 kg/m²  PHYSICAL EXAM:      General Appearance:   Alert, cooperative, no distress   HEENT:   Normocephalic, atraumatic, anicteric      Neck:  Supple, symmetrical, trachea midline   Lungs:   Clear to auscultation bilaterally; no rales, rhonchi or wheezing; respirations unlabored    Heart[de-identified]   Regular rate and rhythm; no murmur, rub, or gallop  Abdomen:   Soft, non-tender, non-distended; normal bowel sounds; no masses, no organomegaly    Genitalia:   Deferred    Rectal:   Deferred    Extremities:  No cyanosis, clubbing or edema    Pulses:  2+ and symmetric    Skin:  No jaundice, rashes, or lesions    Lymph nodes:  No palpable cervical lymphadenopathy        Lab Results:   No visits with results within 1 Day(s) from this visit     Latest known visit with results is:   Appointment on 04/07/2022   Component Date Value    Hemoglobin A1C 04/07/2022 7 7 (A)    EAG 04/07/2022 174     Vit D, 25-Hydroxy 04/07/2022 41 4     TSH 3RD GENERATON 04/07/2022 1 050          Radiology Results:   No results found

## 2022-08-30 ENCOUNTER — TELEPHONE (OUTPATIENT)
Dept: GASTROENTEROLOGY | Facility: CLINIC | Age: 82
End: 2022-08-30

## 2022-09-01 ENCOUNTER — TELEPHONE (OUTPATIENT)
Dept: INTERNAL MEDICINE CLINIC | Facility: CLINIC | Age: 82
End: 2022-09-01

## 2022-09-02 ENCOUNTER — TELEMEDICINE (OUTPATIENT)
Dept: INTERNAL MEDICINE CLINIC | Facility: CLINIC | Age: 82
End: 2022-09-02
Payer: MEDICARE

## 2022-09-02 DIAGNOSIS — J06.9 URI WITH COUGH AND CONGESTION: Primary | ICD-10-CM

## 2022-09-02 PROCEDURE — 99213 OFFICE O/P EST LOW 20 MIN: CPT | Performed by: FAMILY MEDICINE

## 2022-09-02 PROCEDURE — 99441 PR PHYS/QHP TELEPHONE EVALUATION 5-10 MIN: CPT | Performed by: FAMILY MEDICINE

## 2022-09-02 RX ORDER — AZITHROMYCIN 250 MG/1
TABLET, FILM COATED ORAL
Qty: 6 TABLET | Refills: 0 | Status: SHIPPED | OUTPATIENT
Start: 2022-09-02 | End: 2022-09-10

## 2022-09-02 NOTE — PROGRESS NOTES
Virtual Regular Visit    Verification of patient location:    Patient is located in the following state in which I hold an active license [place]      Assessment/Plan:    Problem List Items Addressed This Visit    None     Visit Diagnoses     URI with cough and congestion    -  Primary    Relevant Medications    azithromycin (Zithromax) 250 mg tablet      pt to continue OTC supportive theray and start abx as well  Reason for visit is   Chief Complaint   Patient presents with    Virtual Regular Visit        Encounter provider Fitz Olivera DO    Provider located at [address] [place] [id number]      Recent Visits  Date Type Provider Dept   09/01/22 Telephone Sallie Armas 56 recent visits within past 7 days and meeting all other requirements  Today's Visits  Date Type Provider Dept   09/02/22 Telemedicine Jarred Barreto today's visits and meeting all other requirements  Future Appointments  No visits were found meeting these conditions  Showing future appointments within next 150 days and meeting all other requirements       The patient was identified by name and date of birth  Sergio Filler was informed that this is a telemedicine visit and that the visit is being conducted through Telephone  My office door was closed  No one else was in the room  She acknowledged consent and understanding of privacy and security of the video platform  The patient has agreed to participate and understands they can discontinue the visit at any time  Patient is aware this is a billable service  Leonila Bo is a 80 y o  female    Reports URI symptoms since the 29th  Taking OTC medicine cold medicine and sudafed  Sick contact was her daughter who lives with her  Her dauther tested negative for covid and flu            Past Medical History:   Diagnosis Date    Diabetes mellitus ([facility] [address] )     Hyperlipidemia     Hypertension        Past Surgical History:   Procedure Laterality Date    CARDIAC SURGERY         Current Outpatient Medications   Medication Sig Dispense Refill    azithromycin (Zithromax) 250 mg tablet Take 2 tablets (500 mg total) by mouth daily for 1 day, THEN 1 tablet (250 mg total) daily for 4 days   6 tablet 0    Accu-Chek FastClix Lancets MISC Use      ammonium lactate (LAC-HYDRIN) 12 % cream Apply topically as needed for dry skin (Patient not taking: No sig reported) 385 g 0    ascorbic Acid (VITAMIN C) 500 MG CPCR capsule, extended release: 500 mg 1 capsule twice a day by mouth      Aspirin Buf,CaCarb-MgCarb-MgO, 81 MG TABS Take 81 mg by mouth      B Complex-Biotin-FA (HM Vitamin B100 Complex) TABS Take by mouth      benazepril (LOTENSIN) 10 mg tablet TAKE 1 TABLET BY MOUTH EVERY DAY 90 tablet 1    benzonatate (TESSALON) 200 MG capsule Take 1 capsule (200 mg total) by mouth 3 (three) times a day as needed for cough 20 capsule 0    Blood Glucose Monitoring Suppl (Accu-Chek Guide) w/Device KIT Use      Carboxymethylcellul-Glycerin (Refresh Optive) 0 5-0 9 % SOLN Apply to eye (Patient not taking: Reported on 8/29/2022)      Cholecalciferol 50 MCG (2000 UT) CAPS Take 2,000 Units by mouth      cilostazol (PLETAL) 100 mg tablet Take 100 mg by mouth 2 (two) times a day      fluticasone (FLONASE) 50 mcg/act nasal spray 1 SPRAY INTO EACH NOSTRIL DAILY AT BEDTIME 16 mL 1    furosemide (LASIX) 20 mg tablet TAKE 1 TABLET BY MOUTH TWICE A DAY (Patient not taking: Reported on 8/29/2022) 180 tablet 0    glipiZIDE (GLUCOTROL XL) 5 mg 24 hr tablet       glucose blood (Accu-Chek Guide) test strip TEST 4 TIMES DAILY DX E11 65      glucose blood test strip Use 1 each daily as needed Use as instructed      ibuprofen (MOTRIN) 600 mg tablet Take 600 mg by mouth      insulin aspart (NovoLOG) 100 units/mL injection Inject under the skin      insulin degludec Ardell Marya FlexTouch) 100 units/mL injection pen Inject under the skin      lactulose 20 g/30 mL Take 30 mL (20 g total) by mouth 2 (two) times a day 1800 mL 0    magnesium citrate solution Take 296 mL by mouth once for 1 dose 296 mL 0    magnesium hydroxide (MILK OF MAGNESIA) 400 mg/5 mL oral suspension Take by mouth daily as needed      meclizine (ANTIVERT) 25 mg tablet Take 1 tablet (25 mg total) by mouth every 12 (twelve) hours as needed for dizziness (Patient not taking: Reported on 8/29/2022) 30 tablet 0    metoprolol tartrate (LOPRESSOR) 25 mg tablet TAKE 1 TABLET BY MOUTH EVERY DAY 90 tablet 5    montelukast (SINGULAIR) 10 mg tablet Take 1 tablet (10 mg total) by mouth daily at bedtime 90 tablet 1    NovoLOG FlexPen 100 units/mL injection pen 18 UNITS BEFORE MEALS   ISF 40/140 UP TP 75 UNITS PER DAY      ondansetron (ZOFRAN-ODT) 4 mg disintegrating tablet Take 1 tablet (4 mg total) by mouth every 6 (six) hours as needed for nausea or vomiting 20 tablet 0    polyethylene glycol (GLYCOLAX) 17 GM/SCOOP powder Take 17 g by mouth daily      Probiotic Product (Align) 4 MG CAPS Take 1 capsule by mouth      simethicone (MYLICON) 80 mg chewable tablet Chew 80 mg      simvastatin (ZOCOR) 40 mg tablet Take 1 tablet (40 mg total) by mouth every evening 30 tablet 3    sucralfate (CARAFATE) 1 g tablet TAKE 1 TABLET BY ORAL ROUTE 4 TIMES EVERY DAY ON AN EMPTY STOMACH 1 HOUR BEFORE MEALS AND AT BEDTIME 360 tablet 5    triamcinolone (KENALOG) 0 1 % cream Apply topically 2 (two) times a day      zolpidem (AMBIEN) 5 mg tablet Take 5 mg by mouth daily at bedtime as needed for sleep       No current facility-administered medications for this visit          Allergies   Allergen Reactions    Keflex [Cephalexin] Hives    Penicillins Rash    Atorvastatin Other (See Comments)     Other reaction(s): Muscle pain  Other reaction(s): Muscle pain      Metformin Diarrhea     Severe diarrhea  Severe diarrhea         Review of Systems   Constitutional: Positive for chills  Negative for fever  HENT: Positive for congestion and sore throat  Respiratory: Positive for cough  Gastrointestinal: Positive for abdominal pain and nausea  Negative for vomiting  Neurological: Positive for weakness and headaches  Video Exam    There were no vitals filed for this visit  Physical Exam  HENT:      Nose: Congestion present  Neurological:      Mental Status: She is alert            I spent 10 minutes directly with the patient during this visit

## 2022-09-07 ENCOUNTER — TELEMEDICINE (OUTPATIENT)
Dept: INTERNAL MEDICINE CLINIC | Facility: CLINIC | Age: 82
End: 2022-09-07
Payer: MEDICARE

## 2022-09-07 ENCOUNTER — APPOINTMENT (EMERGENCY)
Dept: CT IMAGING | Facility: HOSPITAL | Age: 82
DRG: 194 | End: 2022-09-07
Payer: MEDICARE

## 2022-09-07 ENCOUNTER — HOSPITAL ENCOUNTER (INPATIENT)
Facility: HOSPITAL | Age: 82
LOS: 3 days | Discharge: HOME HEALTH | DRG: 194 | End: 2022-09-10
Attending: EMERGENCY MEDICINE | Admitting: FAMILY MEDICINE
Payer: MEDICARE

## 2022-09-07 ENCOUNTER — APPOINTMENT (OUTPATIENT)
Dept: RADIOLOGY | Facility: HOSPITAL | Age: 82
DRG: 194 | End: 2022-09-07
Payer: MEDICARE

## 2022-09-07 DIAGNOSIS — J18.9 PNEUMONIA OF BOTH LUNGS DUE TO INFECTIOUS ORGANISM, UNSPECIFIED PART OF LUNG: Primary | ICD-10-CM

## 2022-09-07 DIAGNOSIS — R06.01 ORTHOPNEA: Primary | ICD-10-CM

## 2022-09-07 DIAGNOSIS — R09.89 CHEST CONGESTION: ICD-10-CM

## 2022-09-07 DIAGNOSIS — I10 ESSENTIAL HYPERTENSION: ICD-10-CM

## 2022-09-07 DIAGNOSIS — R53.1 GENERALIZED WEAKNESS: ICD-10-CM

## 2022-09-07 LAB
2HR DELTA HS TROPONIN: 0 NG/L
ALBUMIN SERPL BCP-MCNC: 3.1 G/DL (ref 3.5–5)
ALP SERPL-CCNC: 109 U/L (ref 46–116)
ALT SERPL W P-5'-P-CCNC: 27 U/L (ref 12–78)
ANION GAP SERPL CALCULATED.3IONS-SCNC: 9 MMOL/L (ref 4–13)
AST SERPL W P-5'-P-CCNC: 14 U/L (ref 5–45)
ATRIAL RATE: 87 BPM
BASOPHILS # BLD MANUAL: 0 THOUSAND/UL (ref 0–0.1)
BASOPHILS NFR MAR MANUAL: 0 % (ref 0–1)
BILIRUB DIRECT SERPL-MCNC: 0.06 MG/DL (ref 0–0.2)
BILIRUB SERPL-MCNC: 0.19 MG/DL (ref 0.2–1)
BUN SERPL-MCNC: 11 MG/DL (ref 5–25)
CALCIUM SERPL-MCNC: 9.7 MG/DL (ref 8.3–10.1)
CARDIAC TROPONIN I PNL SERPL HS: 5 NG/L
CARDIAC TROPONIN I PNL SERPL HS: 5 NG/L
CHLORIDE SERPL-SCNC: 100 MMOL/L (ref 96–108)
CO2 SERPL-SCNC: 28 MMOL/L (ref 21–32)
CREAT SERPL-MCNC: 0.71 MG/DL (ref 0.6–1.3)
EOSINOPHIL # BLD MANUAL: 0.48 THOUSAND/UL (ref 0–0.4)
EOSINOPHIL NFR BLD MANUAL: 5 % (ref 0–6)
ERYTHROCYTE [DISTWIDTH] IN BLOOD BY AUTOMATED COUNT: 13.4 % (ref 11.6–15.1)
FLUAV RNA RESP QL NAA+PROBE: NEGATIVE
FLUBV RNA RESP QL NAA+PROBE: NEGATIVE
GFR SERPL CREATININE-BSD FRML MDRD: 81 ML/MIN/1.73SQ M
GLUCOSE SERPL-MCNC: 119 MG/DL (ref 65–140)
GLUCOSE SERPL-MCNC: 140 MG/DL (ref 65–140)
HCT VFR BLD AUTO: 40.8 % (ref 34.8–46.1)
HGB BLD-MCNC: 13.3 G/DL (ref 11.5–15.4)
LACTATE SERPL-SCNC: 0.9 MMOL/L (ref 0.5–2)
LYMPHOCYTES # BLD AUTO: 2.69 THOUSAND/UL (ref 0.6–4.47)
LYMPHOCYTES # BLD AUTO: 28 % (ref 14–44)
MAGNESIUM SERPL-MCNC: 1.8 MG/DL (ref 1.6–2.6)
MCH RBC QN AUTO: 29.2 PG (ref 26.8–34.3)
MCHC RBC AUTO-ENTMCNC: 32.6 G/DL (ref 31.4–37.4)
MCV RBC AUTO: 90 FL (ref 82–98)
MONOCYTES # BLD AUTO: 0.96 THOUSAND/UL (ref 0–1.22)
MONOCYTES NFR BLD: 10 % (ref 4–12)
NEUTROPHILS # BLD MANUAL: 5.38 THOUSAND/UL (ref 1.85–7.62)
NEUTS BAND NFR BLD MANUAL: 2 % (ref 0–8)
NEUTS SEG NFR BLD AUTO: 54 % (ref 43–75)
NT-PROBNP SERPL-MCNC: 261 PG/ML
P AXIS: 11 DEGREES
PLATELET # BLD AUTO: 399 THOUSANDS/UL (ref 149–390)
PLATELET BLD QL SMEAR: ABNORMAL
PMV BLD AUTO: 9.7 FL (ref 8.9–12.7)
POTASSIUM SERPL-SCNC: 3.7 MMOL/L (ref 3.5–5.3)
PR INTERVAL: 122 MS
PROT SERPL-MCNC: 7.3 G/DL (ref 6.4–8.4)
QRS AXIS: -68 DEGREES
QRSD INTERVAL: 120 MS
QT INTERVAL: 382 MS
QTC INTERVAL: 459 MS
RBC # BLD AUTO: 4.56 MILLION/UL (ref 3.81–5.12)
RBC MORPH BLD: NORMAL
RSV RNA RESP QL NAA+PROBE: NEGATIVE
SARS-COV-2 RNA RESP QL NAA+PROBE: NEGATIVE
SODIUM SERPL-SCNC: 137 MMOL/L (ref 135–147)
T WAVE AXIS: 29 DEGREES
VARIANT LYMPHS # BLD AUTO: 1 %
VENTRICULAR RATE: 87 BPM
WBC # BLD AUTO: 9.6 THOUSAND/UL (ref 4.31–10.16)

## 2022-09-07 PROCEDURE — 80076 HEPATIC FUNCTION PANEL: CPT | Performed by: EMERGENCY MEDICINE

## 2022-09-07 PROCEDURE — 80048 BASIC METABOLIC PNL TOTAL CA: CPT | Performed by: EMERGENCY MEDICINE

## 2022-09-07 PROCEDURE — 93005 ELECTROCARDIOGRAM TRACING: CPT

## 2022-09-07 PROCEDURE — 99285 EMERGENCY DEPT VISIT HI MDM: CPT | Performed by: EMERGENCY MEDICINE

## 2022-09-07 PROCEDURE — 99285 EMERGENCY DEPT VISIT HI MDM: CPT

## 2022-09-07 PROCEDURE — 71250 CT THORAX DX C-: CPT

## 2022-09-07 PROCEDURE — 85007 BL SMEAR W/DIFF WBC COUNT: CPT | Performed by: EMERGENCY MEDICINE

## 2022-09-07 PROCEDURE — 84145 PROCALCITONIN (PCT): CPT | Performed by: INTERNAL MEDICINE

## 2022-09-07 PROCEDURE — 82948 REAGENT STRIP/BLOOD GLUCOSE: CPT

## 2022-09-07 PROCEDURE — 83880 ASSAY OF NATRIURETIC PEPTIDE: CPT | Performed by: EMERGENCY MEDICINE

## 2022-09-07 PROCEDURE — 85027 COMPLETE CBC AUTOMATED: CPT | Performed by: EMERGENCY MEDICINE

## 2022-09-07 PROCEDURE — 71046 X-RAY EXAM CHEST 2 VIEWS: CPT

## 2022-09-07 PROCEDURE — 99213 OFFICE O/P EST LOW 20 MIN: CPT | Performed by: FAMILY MEDICINE

## 2022-09-07 PROCEDURE — 87154 CUL TYP ID BLD PTHGN 6+ TRGT: CPT | Performed by: EMERGENCY MEDICINE

## 2022-09-07 PROCEDURE — 36415 COLL VENOUS BLD VENIPUNCTURE: CPT | Performed by: EMERGENCY MEDICINE

## 2022-09-07 PROCEDURE — 83605 ASSAY OF LACTIC ACID: CPT | Performed by: EMERGENCY MEDICINE

## 2022-09-07 PROCEDURE — 87040 BLOOD CULTURE FOR BACTERIA: CPT | Performed by: EMERGENCY MEDICINE

## 2022-09-07 PROCEDURE — 83735 ASSAY OF MAGNESIUM: CPT | Performed by: EMERGENCY MEDICINE

## 2022-09-07 PROCEDURE — 99223 1ST HOSP IP/OBS HIGH 75: CPT | Performed by: INTERNAL MEDICINE

## 2022-09-07 PROCEDURE — 84484 ASSAY OF TROPONIN QUANT: CPT | Performed by: EMERGENCY MEDICINE

## 2022-09-07 PROCEDURE — 74176 CT ABD & PELVIS W/O CONTRAST: CPT

## 2022-09-07 PROCEDURE — 0241U HB NFCT DS VIR RESP RNA 4 TRGT: CPT | Performed by: EMERGENCY MEDICINE

## 2022-09-07 RX ORDER — GUAIFENESIN 600 MG/1
600 TABLET, EXTENDED RELEASE ORAL 2 TIMES DAILY
Status: DISCONTINUED | OUTPATIENT
Start: 2022-09-08 | End: 2022-09-08

## 2022-09-07 RX ORDER — INSULIN LISPRO 100 [IU]/ML
1-5 INJECTION, SOLUTION INTRAVENOUS; SUBCUTANEOUS
Status: DISCONTINUED | OUTPATIENT
Start: 2022-09-07 | End: 2022-09-10 | Stop reason: HOSPADM

## 2022-09-07 RX ORDER — ASPIRIN 81 MG/1
81 TABLET, CHEWABLE ORAL DAILY
Status: DISCONTINUED | OUTPATIENT
Start: 2022-09-08 | End: 2022-09-10 | Stop reason: HOSPADM

## 2022-09-07 RX ORDER — ACETAMINOPHEN 325 MG/1
650 TABLET ORAL EVERY 6 HOURS PRN
Status: DISCONTINUED | OUTPATIENT
Start: 2022-09-07 | End: 2022-09-10 | Stop reason: HOSPADM

## 2022-09-07 RX ORDER — CILOSTAZOL 100 MG/1
100 TABLET ORAL 2 TIMES DAILY
Status: DISCONTINUED | OUTPATIENT
Start: 2022-09-08 | End: 2022-09-10 | Stop reason: HOSPADM

## 2022-09-07 RX ORDER — ENOXAPARIN SODIUM 100 MG/ML
40 INJECTION SUBCUTANEOUS DAILY
Status: DISCONTINUED | OUTPATIENT
Start: 2022-09-08 | End: 2022-09-07

## 2022-09-07 RX ORDER — LISINOPRIL 10 MG/1
10 TABLET ORAL DAILY
Refills: 1 | Status: DISCONTINUED | OUTPATIENT
Start: 2022-09-08 | End: 2022-09-10 | Stop reason: HOSPADM

## 2022-09-07 RX ORDER — BENZONATATE 100 MG/1
200 CAPSULE ORAL 3 TIMES DAILY PRN
Status: DISCONTINUED | OUTPATIENT
Start: 2022-09-07 | End: 2022-09-10 | Stop reason: HOSPADM

## 2022-09-07 RX ORDER — ASCORBIC ACID 500 MG
500 TABLET ORAL 2 TIMES DAILY
Status: DISCONTINUED | OUTPATIENT
Start: 2022-09-08 | End: 2022-09-10 | Stop reason: HOSPADM

## 2022-09-07 RX ORDER — FUROSEMIDE 20 MG/1
20 TABLET ORAL 2 TIMES DAILY PRN
Status: DISCONTINUED | OUTPATIENT
Start: 2022-09-07 | End: 2022-09-10 | Stop reason: HOSPADM

## 2022-09-07 RX ORDER — INSULIN GLARGINE 100 [IU]/ML
25 INJECTION, SOLUTION SUBCUTANEOUS
Status: DISCONTINUED | OUTPATIENT
Start: 2022-09-07 | End: 2022-09-10 | Stop reason: HOSPADM

## 2022-09-07 RX ORDER — LEVOFLOXACIN 5 MG/ML
750 INJECTION, SOLUTION INTRAVENOUS EVERY 24 HOURS
Status: DISCONTINUED | OUTPATIENT
Start: 2022-09-07 | End: 2022-09-10 | Stop reason: HOSPADM

## 2022-09-07 RX ORDER — PRAVASTATIN SODIUM 80 MG/1
80 TABLET ORAL
Refills: 2 | Status: DISCONTINUED | OUTPATIENT
Start: 2022-09-08 | End: 2022-09-10 | Stop reason: HOSPADM

## 2022-09-07 RX ORDER — SUCRALFATE 1 G/1
1 TABLET ORAL
Status: DISCONTINUED | OUTPATIENT
Start: 2022-09-07 | End: 2022-09-10 | Stop reason: HOSPADM

## 2022-09-07 RX ORDER — INSULIN LISPRO 100 [IU]/ML
1-6 INJECTION, SOLUTION INTRAVENOUS; SUBCUTANEOUS
Status: DISCONTINUED | OUTPATIENT
Start: 2022-09-08 | End: 2022-09-10 | Stop reason: HOSPADM

## 2022-09-07 RX ORDER — SIMETHICONE 80 MG
80 TABLET,CHEWABLE ORAL EVERY 6 HOURS PRN
Status: DISCONTINUED | OUTPATIENT
Start: 2022-09-07 | End: 2022-09-10 | Stop reason: HOSPADM

## 2022-09-07 RX ORDER — ENOXAPARIN SODIUM 100 MG/ML
40 INJECTION SUBCUTANEOUS EVERY 12 HOURS SCHEDULED
Status: DISCONTINUED | OUTPATIENT
Start: 2022-09-07 | End: 2022-09-10 | Stop reason: HOSPADM

## 2022-09-07 RX ADMIN — LEVOFLOXACIN 750 MG: 750 INJECTION, SOLUTION INTRAVENOUS at 23:56

## 2022-09-07 RX ADMIN — INSULIN GLARGINE 25 UNITS: 100 INJECTION, SOLUTION SUBCUTANEOUS at 23:56

## 2022-09-07 RX ADMIN — SUCRALFATE 1 G: 1 TABLET ORAL at 23:56

## 2022-09-07 RX ADMIN — SIMETHICONE 80 MG: 80 TABLET, CHEWABLE ORAL at 23:56

## 2022-09-07 RX ADMIN — BENZONATATE 200 MG: 100 CAPSULE ORAL at 23:59

## 2022-09-07 RX ADMIN — ENOXAPARIN SODIUM 40 MG: 40 INJECTION SUBCUTANEOUS at 23:56

## 2022-09-07 NOTE — PROGRESS NOTES
Virtual Regular Visit    Verification of patient location:    Patient is located in the following state in which I hold an active license [place]      Assessment/Plan:    Problem List Items Addressed This Visit    None     Visit Diagnoses     Orthopnea    -  Primary    Chest congestion                still having trouble breathing  Now worse when laying down  Concern for progressive pneumonia versus CHF  Recommendation was to present to the emergency room  Reason for visit is   Chief Complaint   Patient presents with    Virtual Regular Visit        Encounter provider Reese Avila DO    Provider located at [address] [place] [phone]      Recent Visits  Date Type Provider Dept   09[facility]07[facility]   [facility]   09/01/22 Telephone Sallie Armas 56 recent visits within past 7 days and meeting all other requirements  Future Appointments  No visits were found meeting these conditions  Showing future appointments within next 150 days and meeting all other requirements       The patient was identified by name and date of birth  Caitie Florez was informed that this is a telemedicine visit and that the visit is being conducted through Telephone  My office door was closed  No one else was in the room  She acknowledged consent and understanding of privacy and security of the video platform  The patient has agreed to participate and understands they can discontinue the visit at any time  Patient is aware this is a billable service  Marlene Ellis is a 80 y o  female    Worsening symptoms despite antibiotic therapy  Breathing trouble that has now progressed to nightly  Unable to lay down comfortably           Past Medical History:   Diagnosis Date    Diabetes mellitus ([facility] [address] )     Hyperlipidemia     Hypertension        Past Surgical History:   Procedure Laterality Date    CARDIAC SURGERY         No current facility-administered medications for this visit       Current Outpatient Medications   Medication Sig Dispense Refill    Accu-Chek FastClix Lancets MISC Use      ascorbic Acid (VITAMIN C) 500 MG CPCR capsule, extended release: 500 mg 1 capsule twice a day by mouth      Aspirin Buf,CaCarb-MgCarb-MgO, 81 MG TABS Take 81 mg by mouth      B Complex-Biotin-FA (HM Vitamin B100 Complex) TABS Take by mouth      benazepril (LOTENSIN) 10 mg tablet TAKE 1 TABLET BY MOUTH EVERY DAY 90 tablet 1    benzonatate (TESSALON) 200 MG capsule Take 1 capsule (200 mg total) by mouth 3 (three) times a day as needed for cough 20 capsule 0    Blood Glucose Monitoring Suppl (Accu-Chek Guide) w/Device KIT Use      Carboxymethylcellul-Glycerin (Refresh Optive) 0 5-0 9 % SOLN Apply to eye (Patient not taking: Reported on 8/29/2022)      Cholecalciferol 50 MCG (2000 UT) CAPS Take 2,000 Units by mouth      cilostazol (PLETAL) 100 mg tablet Take 100 mg by mouth 2 (two) times a day      fluticasone (FLONASE) 50 mcg/act nasal spray 1 SPRAY INTO EACH NOSTRIL DAILY AT BEDTIME 16 mL 1    furosemide (LASIX) 20 mg tablet TAKE 1 TABLET BY MOUTH TWICE A DAY (Patient not taking: Reported on 8/29/2022) 180 tablet 0    glipiZIDE (GLUCOTROL XL) 5 mg 24 hr tablet       glucose blood (Accu-Chek Guide) test strip TEST 4 TIMES DAILY DX E11 65      glucose blood test strip Use 1 each daily as needed Use as instructed      ibuprofen (MOTRIN) 600 mg tablet Take 600 mg by mouth      insulin aspart (NovoLOG) 100 units/mL injection Inject under the skin      insulin degludec Herschell Subha FlexTouch) 100 units/mL injection pen Inject under the skin      lactulose 20 g/30 mL Take 30 mL (20 g total) by mouth 2 (two) times a day 1800 mL 0    magnesium citrate solution Take 296 mL by mouth once for 1 dose 296 mL 0    magnesium hydroxide (MILK OF MAGNESIA) 400 mg/5 mL oral suspension Take by mouth daily as needed      metoprolol tartrate (LOPRESSOR) 25 mg tablet TAKE 1 TABLET BY MOUTH EVERY DAY 90 tablet 5    montelukast (SINGULAIR) 10 mg tablet Take 1 tablet (10 mg total) by mouth daily at bedtime 90 tablet 1    NovoLOG FlexPen 100 units/mL injection pen 18 UNITS BEFORE MEALS   ISF 40/140 UP TP 75 UNITS PER DAY      ondansetron (ZOFRAN-ODT) 4 mg disintegrating tablet Take 1 tablet (4 mg total) by mouth every 6 (six) hours as needed for nausea or vomiting 20 tablet 0    polyethylene glycol (GLYCOLAX) 17 GM/SCOOP powder Take 17 g by mouth daily      Probiotic Product (Align) 4 MG CAPS Take 1 capsule by mouth      simethicone (MYLICON) 80 mg chewable tablet Chew 80 mg      simvastatin (ZOCOR) 40 mg tablet TAKE 1 TABLET BY MOUTH EVERY DAY IN THE EVENING 90 tablet 2    sucralfate (CARAFATE) 1 g tablet TAKE 1 TABLET BY ORAL ROUTE 4 TIMES EVERY DAY ON AN EMPTY STOMACH 1 HOUR BEFORE MEALS AND AT BEDTIME 360 tablet 5    triamcinolone (KENALOG) 0 1 % cream Apply topically 2 (two) times a day      zolpidem (AMBIEN) 5 mg tablet Take 5 mg by mouth daily at bedtime as needed for sleep       Facility-Administered Medications Ordered in Other Visits   Medication Dose Route Frequency Provider Last Rate Last Admin    acetaminophen (TYLENOL) tablet 650 mg  650 mg Oral Q6H PRN Emilia Sutherland PA-C   650 mg at 09/08/22 0126    ascorbic acid (VITAMIN C) tablet 500 mg  500 mg Oral BID Mireya Herrmann PA-C        aspirin chewable tablet 81 mg  81 mg Oral Daily Emilia Sutherland PA-C        benzonatate (TESSALON PERLES) capsule 200 mg  200 mg Oral TID PRN Mireya Herrmann PA-C   200 mg at 09/07/22 2359    cilostazol (PLETAL) tablet 100 mg  100 mg Oral BID Emilia Sutherland PA-C        enoxaparin (LOVENOX) subcutaneous injection 40 mg  40 mg Subcutaneous Q12H [facility] Emilia Sutherland PA-C   40 mg at 09/07/22 2356    furosemide (LASIX) tablet 20 mg  20 mg Oral BID PRN Sirisha Morgan PA-C        guaiFENesin (MUCINEX) 12 hr tablet 600 mg  600 mg Oral BID Emilia Sutherland PA-C   600 mg at 09/08/22 0126    insulin glargine (LANTUS) subcutaneous injection 25 Units 0 25 mL  25 Units Subcutaneous HS Emilia Sutherland PA-C   25 Units at 09/07/22 2356    insulin lispro (HumaLOG) 100 units/mL subcutaneous injection 1-5 Units  1-5 Units Subcutaneous HS Emilia Sutherland PA-C        insulin lispro (HumaLOG) 100 units/mL subcutaneous injection 1-6 Units  1-6 Units Subcutaneous TID AC Emilia Sutherland PA-C        levofloxacin (LEVAQUIN) IVPB (premix in dextrose) 750 mg 150 mL  750 mg Intravenous Q24H Emilia Sutherland PA-C 100 mL/hr at 09/07/22 2356 750 mg at 09/07/22 2356    lisinopril (ZESTRIL) tablet 10 mg  10 mg Oral Daily Emilia Sutherland PA-C        magnesium hydroxide (MILK OF MAGNESIA) oral suspension 15 mL  15 mL Oral Daily PRN Emilia Sutherland PA-C   15 mL at 09/08/22 0140    melatonin tablet 3 mg  3 mg Oral HS Emilia Sutherland PA-C   3 mg at 09/08/22 0126    metoprolol tartrate (LOPRESSOR) tablet 25 mg  25 mg Oral Daily Emilia Sutherland PA-C        pravastatin (PRAVACHOL) tablet 80 mg  80 mg Oral Daily [employer]        [facility]) chewable tablet 80 mg  80 mg Oral Q6H PRN Emilia Sutherland PA-C   80 mg at 09/07/22 2356    sucralfate (CARAFATE) tablet 1 g  1 g Oral 4x Daily (AC & HS) Emilia Sutherland PA-C   1 g at 09/08/22 8586        Allergies   Allergen Reactions    Keflex [Cephalexin] Hives    Penicillins Rash    Atorvastatin Other (See Comments)     Other reaction(s): Muscle pain  Other reaction(s): Muscle pain      Metformin Diarrhea     Severe diarrhea  Severe diarrhea         Review of Systems   Constitutional: Positive for fatigue  Negative for fever  HENT: Positive for congestion  Respiratory: Positive for shortness of breath  Cardiovascular: Negative for chest pain         Video Exam    There were no vitals filed for this visit     Physical Exam  HENT:      Nose: Congestion present  Neurological:      Mental Status: She is alert            I spent 7 minutes directly with the patient during this visit

## 2022-09-07 NOTE — ED PROVIDER NOTES
History  Chief Complaint   Patient presents with    Shortness of Breath     Pt sent here by PCP to be evaluated for CHF; pt c/o cough, congestion, sob onset 1 week ago     82-year-old female sent in for evaluation of worsening shortness of breath for the past 1 week  Has had runny nose and sore throat and has had subjective fever  Has not been able to sleep or use her CPAP at night has had fatigue and has been run down and has been feeling like she needs to drink a lot of water  Talked with her cardiologist who was concerned that this could be signs of congestive heart failure and told her she needed to come in for evaluation  Shortness of Breath  Severity:  Moderate  Onset quality:  Gradual  Duration:  1 week  Timing:  Constant  Progression:  Worsening  Chronicity:  New  Context: URI    Relieved by:  Nothing  Worsened by:  Nothing  Ineffective treatments:  None tried  Associated symptoms: cough, fever (subjective with chills) and sore throat    Associated symptoms: no abdominal pain, no chest pain, no sputum production and no syncope    Risk factors: no prolonged immobilization and no tobacco use        Prior to Admission Medications   Prescriptions Last Dose Informant Patient Reported? Taking?    Accu-Chek FastClix Lancets MISC   Yes No   Sig: Use   Aspirin Buf,CaCarb-MgCarb-MgO, 81 MG TABS   Yes No   Sig: Take 81 mg by mouth   B Complex-Biotin-FA (HM Vitamin B100 Complex) TABS   Yes No   Sig: Take by mouth   Blood Glucose Monitoring Suppl (Accu-Chek Guide) w/Device KIT   Yes No   Sig: Use   Carboxymethylcellul-Glycerin (Refresh Optive) 0 5-0 9 % SOLN   Yes No   Sig: Apply to eye   Patient not taking: Reported on 2022   Cholecalciferol 50 MCG ( UT) CAPS   Yes No   Sig: Take 2,000 Units by mouth   NovoLOG FlexPen 100 units/mL injection pen   Yes No   Si UNITS BEFORE MEALS   ISF 40/140 UP TP 75 UNITS PER DAY   Probiotic Product (Align) 4 MG CAPS   Yes No   Sig: Take 1 capsule by mouth ammonium lactate (LAC-HYDRIN) 12 % cream   No No   Sig: Apply topically as needed for dry skin   Patient not taking: No sig reported   ascorbic Acid (VITAMIN C) 500 MG CPCR   Yes No   Sig: capsule, extended release: 500 mg 1 capsule twice a day by mouth   azithromycin (Zithromax) 250 mg tablet   No No   Sig: Take 2 tablets (500 mg total) by mouth daily for 1 day, THEN 1 tablet (250 mg total) daily for 4 days     benazepril (LOTENSIN) 10 mg tablet   No No   Sig: TAKE 1 TABLET BY MOUTH EVERY DAY   benzonatate (TESSALON) 200 MG capsule   No No   Sig: Take 1 capsule (200 mg total) by mouth 3 (three) times a day as needed for cough   cilostazol (PLETAL) 100 mg tablet   Yes No   Sig: Take 100 mg by mouth 2 (two) times a day   fluticasone (FLONASE) 50 mcg/act nasal spray   No No   Si SPRAY INTO EACH NOSTRIL DAILY AT BEDTIME   furosemide (LASIX) 20 mg tablet   No No   Sig: TAKE 1 TABLET BY MOUTH TWICE A DAY   Patient not taking: Reported on 2022   glipiZIDE (GLUCOTROL XL) 5 mg 24 hr tablet   Yes No   glucose blood (Accu-Chek Guide) test strip   Yes No   Sig: TEST 4 TIMES DAILY DX E11 65   glucose blood test strip  Self Yes No   Sig: Use 1 each daily as needed Use as instructed   ibuprofen (MOTRIN) 600 mg tablet   Yes No   Sig: Take 600 mg by mouth   insulin aspart (NovoLOG) 100 units/mL injection  Self Yes No   Sig: Inject under the skin   insulin degludec Rheta Nunnery FlexTouch) 100 units/mL injection pen  Self Yes No   Sig: Inject under the skin   lactulose 20 g/30 mL   No No   Sig: Take 30 mL (20 g total) by mouth 2 (two) times a day   magnesium citrate solution   No No   Sig: Take 296 mL by mouth once for 1 dose   magnesium hydroxide (MILK OF MAGNESIA) 400 mg/5 mL oral suspension   Yes No   Sig: Take by mouth daily as needed   meclizine (ANTIVERT) 25 mg tablet   No No   Sig: Take 1 tablet (25 mg total) by mouth every 12 (twelve) hours as needed for dizziness   Patient not taking: Reported on 2022   metoprolol tartrate (LOPRESSOR) 25 mg tablet   No No   Sig: TAKE 1 TABLET BY MOUTH EVERY DAY   montelukast (SINGULAIR) 10 mg tablet   No No   Sig: Take 1 tablet (10 mg total) by mouth daily at bedtime   ondansetron (ZOFRAN-ODT) 4 mg disintegrating tablet   No No   Sig: Take 1 tablet (4 mg total) by mouth every 6 (six) hours as needed for nausea or vomiting   polyethylene glycol (GLYCOLAX) 17 GM/SCOOP powder   Yes No   Sig: Take 17 g by mouth daily   simethicone (MYLICON) 80 mg chewable tablet   Yes No   Sig: Chew 80 mg   simvastatin (ZOCOR) 40 mg tablet   No No   Sig: TAKE 1 TABLET BY MOUTH EVERY DAY IN THE EVENING   sucralfate (CARAFATE) 1 g tablet   No No   Sig: TAKE 1 TABLET BY ORAL ROUTE 4 TIMES EVERY DAY ON AN EMPTY STOMACH 1 HOUR BEFORE MEALS AND AT BEDTIME   triamcinolone (KENALOG) 0 1 % cream   Yes No   Sig: Apply topically 2 (two) times a day   zolpidem (AMBIEN) 5 mg tablet  Self Yes No   Sig: Take 5 mg by mouth daily at bedtime as needed for sleep      Facility-Administered Medications: None       Past Medical History:   Diagnosis Date    Diabetes mellitus ([facility] [address] )     Hyperlipidemia     Hypertension        Past Surgical History:   Procedure Laterality Date    CARDIAC SURGERY         Family History   Problem Relation Age of Onset    Heart disease Mother     Diabetes Mother     Hypertension Mother     Heart disease Father     Hypertension Father     Diabetes Father      I have reviewed and agree with the history as documented  E-Cigarette/Vaping    E-Cigarette Use Never User      E-Cigarette/Vaping Substances    Nicotine No     THC No     CBD No     Flavoring No     Other No     Unknown No      Social History     Tobacco Use    Smoking status: Never Smoker    Smokeless tobacco: Never Used   Vaping Use    Vaping Use: Never used   Substance Use Topics    Alcohol use:  Yes     Alcohol/week: 1 0 standard drink     Types: 1 Glasses of wine per week     Comment: rarely     Drug use: Never       Review of Systems   Constitutional: Positive for fever (subjective with chills)  HENT: Positive for sore throat  Respiratory: Positive for cough and shortness of breath  Negative for sputum production  Cardiovascular: Negative for chest pain and syncope  Gastrointestinal: Negative for abdominal pain  All other systems reviewed and are negative  Physical Exam  Physical Exam  Vitals and nursing note reviewed  Constitutional:       General: She is not in acute distress  Appearance: She is well-developed  She is not diaphoretic  HENT:      Head: Normocephalic and atraumatic  Nose: Nose normal    Eyes:      Extraocular Movements: Extraocular movements intact  Conjunctiva/sclera: Conjunctivae normal       Pupils: Pupils are equal, round, and reactive to light  Cardiovascular:      Rate and Rhythm: Normal rate and regular rhythm  Heart sounds: Normal heart sounds  Pulmonary:      Effort: Pulmonary effort is normal  No respiratory distress  Breath sounds: Rhonchi present  Abdominal:      General: There is no distension  Palpations: Abdomen is soft  Tenderness: There is no abdominal tenderness  Musculoskeletal:         General: Normal range of motion  Cervical back: Normal range of motion and neck supple  Right lower leg: No edema  Left lower leg: No edema  Skin:     General: Skin is warm and dry  Neurological:      Mental Status: She is alert and oriented to person, place, and time  Psychiatric:         Mood and Affect: Mood is anxious (pt tearful and anxious over her ill brother)           Vital Signs  ED Triage Vitals [09/07/22 1828]   Temperature Pulse Respirations Blood Pressure SpO2   98 7 °F (37 1 °C) 91 19 159/69 95 %      Temp Source Heart Rate Source Patient Position - Orthostatic VS BP Location FiO2 (%)   Oral Monitor Sitting Left arm --      Pain Score       --           Vitals:    09/07/22 1828 09/07/22 2027 09/07/22 2206   BP: 159/69 131/59 163/70   Pulse: 91 84 82   Patient Position - Orthostatic VS: Sitting Lying Lying         Visual Acuity      ED Medications  Medications   doxycycline (VIBRAMYCIN) 100 mg in sodium chloride 0 9 % 100 mL IVPB (has no administration in time range)       Diagnostic Studies  Results Reviewed     Procedure Component Value Units Date/Time    Lactic acid [[id number]] Collected: 09/07/22 2210    Lab Status: In process Specimen: Blood from Arm, Left Updated: 09/07/22 2214    Blood culture #1 [[id number]] Collected: 09/07/22 2210    Lab Status: In process Specimen: Blood from Arm, Left Updated: 09/07/22 2214    Blood culture #2 [[id number]] Collected: 09/07/22 2210    Lab Status: In process Specimen: Blood from Arm, Right Updated: 09/07/22 2214    HS Troponin I 2hr [[id number]]  (Normal) Collected: 09/07/22 2131    Lab Status: Final result Specimen: Blood from Arm, Left Updated: 09/07/22 2207     hs TnI 2hr 5 ng/L      Delta 2hr hsTnI 0 ng/L     FLU/RSV/COVID - if FLU/RSV clinically relevant [[id number]] Collected: 09/07/22 2131    Lab Status: In process Specimen: Nares from Nose Updated: 09/07/22 2136    CBC and differential [[id number]]  (Abnormal) Collected: 09/07/22 1953    Lab Status: Final result Specimen: Blood from Arm, Left Updated: 09/07/22 2037     WBC 9 60 Thousand/uL      RBC 4 56 Million/uL      Hemoglobin 13 3 g/dL      Hematocrit 40 8 %      MCV 90 fL      MCH 29 2 pg      MCHC 32 6 g/dL      RDW 13 4 %      MPV 9 7 fL      Platelets 932 Thousands/uL     Narrative: This is an appended report  These results have been appended to a previously verified report      Manual Differential(PHLEBS Do Not Order) [[id number]]  (Abnormal) Collected: 09/07/22 1953    Lab Status: Final result Specimen: Blood from Arm, Left Updated: 09/07/22 2037     Segmented % 54 %      Bands % 2 %      Lymphocytes % 28 %      Monocytes % 10 %      Eosinophils, % 5 %      Basophils % 0 %      Atypical Lymphocytes % 1 %      Absolute Neutrophils 5 38 Thousand/uL      Lymphocytes Absolute 2 69 Thousand/uL      Monocytes Absolute 0 96 Thousand/uL      Eosinophils Absolute 0 48 Thousand/uL      Basophils Absolute 0 00 Thousand/uL      Total Counted --     RBC Morphology Normal     Platelet Estimate Increased    HS Troponin I 4hr [[id number]]     Lab Status: No result Specimen: Blood     HS Troponin 0hr (reflex protocol) [[id number]]  (Normal) Collected: 09/07/22 1953    Lab Status: Final result Specimen: Blood from Arm, Left Updated: 09/07/22 2033     hs TnI 0hr 5 ng/L     Hepatic function panel [[id number]]  (Abnormal) Collected: 09/07/22 1953    Lab Status: Final result Specimen: Blood from Arm, Left Updated: 09/07/22 2029     Total Bilirubin 0 19 mg/dL      Bilirubin, Direct 0 06 mg/dL      Alkaline Phosphatase 109 U/L      AST 14 U/L      ALT 27 U/L      Total Protein 7 3 g/dL      Albumin 3 1 g/dL     Magnesium [[id number]]  (Normal) Collected: 09/07/22 1953    Lab Status: Final result Specimen: Blood from Arm, Left Updated: 09/07/22 2029     Magnesium 1 8 mg/dL     NT-BNP PRO [[id number]]  (Normal) Collected: 09/07/22 1953    Lab Status: Final result Specimen: Blood from Arm, Left Updated: 09/07/22 2029     NT-proBNP 261 pg/mL     Basic metabolic panel [[id number]] Collected: 09/07/22 1953    Lab Status: Final result Specimen: Blood from Arm, Left Updated: 09/07/22 2021     Sodium 137 mmol/L      Potassium 3 7 mmol/L      Chloride 100 mmol/L      CO2 28 mmol/L      ANION GAP 9 mmol/L      BUN 11 mg/dL      Creatinine 0 71 mg/dL      Glucose 140 mg/dL      Calcium 9 7 mg/dL      eGFR 81 ml/min/1 73sq m     Narrative:      [place] guidelines for Chronic Kidney Disease (CKD):     Stage 1 with normal or high GFR (GFR > 90 mL/min/1 73 square meters)    Stage 2 Mild CKD (GFR = 60-89 mL/min/1 73 square meters)    Stage 3A Moderate CKD (GFR = 45-59 mL/min/1 73 square meters)    Stage 3B Moderate CKD (GFR = 30-44 mL/min/1 73 square meters)    Stage 4 Severe CKD (GFR = 15-29 mL/min/1 73 square meters)    Stage 5 End Stage CKD (GFR <15 mL/min/1 73 square meters)  Note: GFR calculation is accurate only with a steady state creatinine                 CT chest abdomen pelvis wo contrast   Final Result by Omero Lagunas MD (09/07 2154)      1  Multifocal pneumonia  2   No evidence of acute intra-abdominal or pelvic process                    Workstation performed: [id number]         XR chest 2 views   ED Interpretation by Rajinder Anthony MD ([id number])   Congested xray                   Procedures  ECG 12 Lead Documentation Only    Date/Time: 9/7/2022 7:50 PM  Performed by: Rajinder Anthony MD  Authorized by: Rajinder Anthony MD     Indications / Diagnosis:  Dyspnea  ECG reviewed by me, the ED Provider: yes    Patient location:  ED  Rate:     ECG rate:  87    ECG rate assessment: normal    Rhythm:     Rhythm: sinus rhythm    Conduction:     Conduction: abnormal      Abnormal conduction: incomplete RBBB               ED Course                                             MDM  Number of Diagnoses or Management Options  Pneumonia of both lungs due to infectious organism, unspecified part of lung: new and requires workup     Amount and/or Complexity of Data Reviewed  Clinical lab tests: ordered and reviewed  Tests in the radiology section of CPT®: ordered  Independent visualization of images, tracings, or specimens: yes    Risk of Complications, Morbidity, and/or Mortality  Presenting problems: high        Disposition  Final diagnoses:   Pneumonia of both lungs due to infectious organism, unspecified part of lung     Time reflects when diagnosis was documented in both MDM as applicable and the Disposition within this note     Time User Action Codes Description Comment    9/7/2022 10:04 PM Jessica, [address] [J18 [id number]] Pneumonia of both lungs due to infectious organism, unspecified part of lung       ED Disposition     ED Disposition Admit    Condition   Stable    Date/Time   Wed Sep 7, 2022 10:04 PM    Comment   --         Follow-up Information    None         Patient's Medications   Discharge Prescriptions    No medications on file       No discharge procedures on file      PDMP Review     None          ED Provider  Electronically Signed by           Yessica Carlin MD  09/07/22 1409 [address], [place]  09/07/22 0570

## 2022-09-08 PROBLEM — E66.01 MORBID OBESITY (HCC): Status: ACTIVE | Noted: 2022-09-08

## 2022-09-08 LAB
ALBUMIN SERPL BCP-MCNC: 2.7 G/DL (ref 3.5–5)
ALP SERPL-CCNC: 87 U/L (ref 46–116)
ALT SERPL W P-5'-P-CCNC: 24 U/L (ref 12–78)
ANION GAP SERPL CALCULATED.3IONS-SCNC: 10 MMOL/L (ref 4–13)
AST SERPL W P-5'-P-CCNC: 13 U/L (ref 5–45)
BILIRUB SERPL-MCNC: 0.23 MG/DL (ref 0.2–1)
BUN SERPL-MCNC: 10 MG/DL (ref 5–25)
CALCIUM ALBUM COR SERPL-MCNC: 10.2 MG/DL (ref 8.3–10.1)
CALCIUM SERPL-MCNC: 9.2 MG/DL (ref 8.3–10.1)
CHLORIDE SERPL-SCNC: 103 MMOL/L (ref 96–108)
CO2 SERPL-SCNC: 26 MMOL/L (ref 21–32)
CREAT SERPL-MCNC: 0.63 MG/DL (ref 0.6–1.3)
ERYTHROCYTE [DISTWIDTH] IN BLOOD BY AUTOMATED COUNT: 13.4 % (ref 11.6–15.1)
GFR SERPL CREATININE-BSD FRML MDRD: 85 ML/MIN/1.73SQ M
GLUCOSE SERPL-MCNC: 121 MG/DL (ref 65–140)
GLUCOSE SERPL-MCNC: 135 MG/DL (ref 65–140)
GLUCOSE SERPL-MCNC: 141 MG/DL (ref 65–140)
GLUCOSE SERPL-MCNC: 153 MG/DL (ref 65–140)
GLUCOSE SERPL-MCNC: 201 MG/DL (ref 65–140)
HCT VFR BLD AUTO: 38.2 % (ref 34.8–46.1)
HGB BLD-MCNC: 12.4 G/DL (ref 11.5–15.4)
L PNEUMO1 AG UR QL IA.RAPID: NEGATIVE
MCH RBC QN AUTO: 28.9 PG (ref 26.8–34.3)
MCHC RBC AUTO-ENTMCNC: 32.5 G/DL (ref 31.4–37.4)
MCV RBC AUTO: 89 FL (ref 82–98)
PLATELET # BLD AUTO: 365 THOUSANDS/UL (ref 149–390)
PMV BLD AUTO: 9.5 FL (ref 8.9–12.7)
POTASSIUM SERPL-SCNC: 3.9 MMOL/L (ref 3.5–5.3)
PROCALCITONIN SERPL-MCNC: 0.17 NG/ML
PROCALCITONIN SERPL-MCNC: 0.17 NG/ML
PROT SERPL-MCNC: 6.6 G/DL (ref 6.4–8.4)
RBC # BLD AUTO: 4.29 MILLION/UL (ref 3.81–5.12)
S PNEUM AG UR QL: NEGATIVE
SODIUM SERPL-SCNC: 139 MMOL/L (ref 135–147)
WBC # BLD AUTO: 8.34 THOUSAND/UL (ref 4.31–10.16)

## 2022-09-08 PROCEDURE — 99233 SBSQ HOSP IP/OBS HIGH 50: CPT | Performed by: INTERNAL MEDICINE

## 2022-09-08 PROCEDURE — 97163 PT EVAL HIGH COMPLEX 45 MIN: CPT

## 2022-09-08 PROCEDURE — 82948 REAGENT STRIP/BLOOD GLUCOSE: CPT

## 2022-09-08 PROCEDURE — 85027 COMPLETE CBC AUTOMATED: CPT | Performed by: INTERNAL MEDICINE

## 2022-09-08 PROCEDURE — 97167 OT EVAL HIGH COMPLEX 60 MIN: CPT

## 2022-09-08 PROCEDURE — 80053 COMPREHEN METABOLIC PANEL: CPT | Performed by: INTERNAL MEDICINE

## 2022-09-08 PROCEDURE — 87449 NOS EACH ORGANISM AG IA: CPT | Performed by: INTERNAL MEDICINE

## 2022-09-08 PROCEDURE — 84145 PROCALCITONIN (PCT): CPT | Performed by: INTERNAL MEDICINE

## 2022-09-08 PROCEDURE — 36415 COLL VENOUS BLD VENIPUNCTURE: CPT | Performed by: INTERNAL MEDICINE

## 2022-09-08 RX ORDER — ECHINACEA PURPUREA EXTRACT 125 MG
2 TABLET ORAL 4 TIMES DAILY PRN
Status: DISCONTINUED | OUTPATIENT
Start: 2022-09-08 | End: 2022-09-10 | Stop reason: HOSPADM

## 2022-09-08 RX ORDER — LANOLIN ALCOHOL/MO/W.PET/CERES
3 CREAM (GRAM) TOPICAL
Status: DISCONTINUED | OUTPATIENT
Start: 2022-09-08 | End: 2022-09-10 | Stop reason: HOSPADM

## 2022-09-08 RX ORDER — GUAIFENESIN 600 MG/1
600 TABLET, EXTENDED RELEASE ORAL 2 TIMES DAILY
Status: DISCONTINUED | OUTPATIENT
Start: 2022-09-08 | End: 2022-09-10 | Stop reason: HOSPADM

## 2022-09-08 RX ORDER — POLYETHYLENE GLYCOL 3350 17 G/17G
17 POWDER, FOR SOLUTION ORAL
Status: DISCONTINUED | OUTPATIENT
Start: 2022-09-08 | End: 2022-09-10 | Stop reason: HOSPADM

## 2022-09-08 RX ADMIN — PRAVASTATIN SODIUM 80 MG: 80 TABLET ORAL at 16:44

## 2022-09-08 RX ADMIN — GUAIFENESIN 600 MG: 600 TABLET, EXTENDED RELEASE ORAL at 01:26

## 2022-09-08 RX ADMIN — GUAIFENESIN 600 MG: 600 TABLET, EXTENDED RELEASE ORAL at 09:34

## 2022-09-08 RX ADMIN — POLYETHYLENE GLYCOL 3350 17 G: 17 POWDER, FOR SOLUTION ORAL at 17:37

## 2022-09-08 RX ADMIN — MAGNESIUM HYDROXIDE 15 ML: 400 SUSPENSION ORAL at 23:05

## 2022-09-08 RX ADMIN — CILOSTAZOL 100 MG: 100 TABLET ORAL at 18:41

## 2022-09-08 RX ADMIN — ASPIRIN 81 MG: 81 TABLET, CHEWABLE ORAL at 09:35

## 2022-09-08 RX ADMIN — CILOSTAZOL 100 MG: 100 TABLET ORAL at 09:34

## 2022-09-08 RX ADMIN — LEVOFLOXACIN 750 MG: 750 INJECTION, SOLUTION INTRAVENOUS at 22:08

## 2022-09-08 RX ADMIN — METOPROLOL TARTRATE 25 MG: 25 TABLET, FILM COATED ORAL at 09:34

## 2022-09-08 RX ADMIN — SUCRALFATE 1 G: 1 TABLET ORAL at 21:20

## 2022-09-08 RX ADMIN — Medication 3 MG: at 22:08

## 2022-09-08 RX ADMIN — OXYCODONE HYDROCHLORIDE AND ACETAMINOPHEN 500 MG: 500 TABLET ORAL at 09:34

## 2022-09-08 RX ADMIN — SUCRALFATE 1 G: 1 TABLET ORAL at 12:18

## 2022-09-08 RX ADMIN — LISINOPRIL 10 MG: 10 TABLET ORAL at 09:34

## 2022-09-08 RX ADMIN — MAGNESIUM HYDROXIDE 15 ML: 400 SUSPENSION ORAL at 01:40

## 2022-09-08 RX ADMIN — ACETAMINOPHEN 650 MG: 325 TABLET ORAL at 22:10

## 2022-09-08 RX ADMIN — Medication 3 MG: at 01:26

## 2022-09-08 RX ADMIN — OXYCODONE HYDROCHLORIDE AND ACETAMINOPHEN 500 MG: 500 TABLET ORAL at 18:41

## 2022-09-08 RX ADMIN — INSULIN LISPRO 2 UNITS: 100 INJECTION, SOLUTION INTRAVENOUS; SUBCUTANEOUS at 12:51

## 2022-09-08 RX ADMIN — SUCRALFATE 1 G: 1 TABLET ORAL at 07:23

## 2022-09-08 RX ADMIN — ENOXAPARIN SODIUM 40 MG: 40 INJECTION SUBCUTANEOUS at 09:35

## 2022-09-08 RX ADMIN — GUAIFENESIN 600 MG: 600 TABLET, EXTENDED RELEASE ORAL at 18:41

## 2022-09-08 RX ADMIN — SIMETHICONE 80 MG: 80 TABLET, CHEWABLE ORAL at 18:41

## 2022-09-08 RX ADMIN — ENOXAPARIN SODIUM 40 MG: 40 INJECTION SUBCUTANEOUS at 21:20

## 2022-09-08 RX ADMIN — SUCRALFATE 1 G: 1 TABLET ORAL at 16:44

## 2022-09-08 RX ADMIN — ACETAMINOPHEN 650 MG: 325 TABLET ORAL at 01:26

## 2022-09-08 RX ADMIN — INSULIN GLARGINE 25 UNITS: 100 INJECTION, SOLUTION SUBCUTANEOUS at 21:20

## 2022-09-08 RX ADMIN — ACETAMINOPHEN 650 MG: 325 TABLET ORAL at 12:51

## 2022-09-08 NOTE — PHYSICAL THERAPY NOTE
Physical Therapy Evaluation     Patient's Name: Henry Zarate    Admitting Diagnosis  SOB (shortness of breath) [R06 02]    Problem List  Patient Active Problem List   Diagnosis    PVD (peripheral vascular disease) ([facility] )    Body mass index (BMI) of 40 0 to 44 9 in [facility])    Coronary artery disease involving coronary bypass graft of native heart without angina pectoris    Type 2 diabetes mellitus without complication, with long-term current use of insulin ([facility])    Essential hypertension    Chronic constipation    Post-nasal drip    Pneumonia    Generalized weakness       Past Medical History  Past Medical History:   Diagnosis Date    Diabetes mellitus ([facility] )     Hyperlipidemia     Hypertension        Past Surgical History  Past Surgical History:   Procedure Laterality Date    CARDIAC SURGERY          09/08/22 0855   PT Last Visit   PT Visit Date 09/08/22   Note Type   Note type Evaluation   Pain Assessment   Pain Assessment Tool 0-10   Pain Score No Pain   Restrictions/Precautions   Weight Bearing Precautions Per Order No   Braces or Orthoses Other (Comment)  (left foot arch support PRN)   Other Precautions Chair Alarm; Bed Alarm;Telemetry; Fall Risk   Home Living   Type of [address] [place] [address] One level;Performs ADLs on one level  (5-6 AMBAR)   Bathroom Shower/Tub Tub/shower unit   Bathroom Toilet Standard   Bathroom Equipment Hand-held shower   [facility]; Other (Comment)   Additional Comments ambulatory with SPC mostly outdoors, keeps rollator in the car for use as needed  Prior Function   Level of [place] Independent with ADLs and functional mobility; Needs assistance with IADLs   Lives With Daughter  (Brother (has Alzheimers))   Receives Help From Family  (daughter)   ADL Assistance Independent   IADLs Needs assistance   Falls in the last 6 months 1 to 4  (one fall reported)   Vocational Retired   General   Family/Caregiver Present No   Cognition Overall Cognitive Status WFL   Attention Within functional limits   Orientation Level Oriented X4   Memory Within functional limits   Following Commands Follows all commands and directions without difficulty   Comments Pt agreeable to PT   RLE Assessment   RLE Assessment X   Strength RLE   RLE Overall Strength 4/5   LLE Assessment   LLE Assessment X   Strength LLE   LLE Overall Strength 4/5   Vision-Basic Assessment   Current Vision Wears glasses only for reading   Patient Visual Report Other (Comment)  (no significant changes reported)   Light Touch   RLE Light Touch Impaired   LLE Light Touch Impaired   Bed Mobility   Additional Comments Pt received seated in bedside recliner   Transfers   Sit to Stand 4  Minimal assistance   Additional items Assist x 1; Armrests; Increased time required;Verbal cues;HOB elevated   Stand to Sit 4  Minimal assistance   Additional items Assist x 1; Armrests; Increased time required;Verbal cues   Additional Comments with use of SPC   Ambulation/Elevation   Gait pattern Decreased foot clearance;Narrow EPI; Inconsistent jenna; Short stride;Decreased heel strike   Gait Assistance 4  Minimal assist   Additional items Assist x 1;Verbal cues   Assistive Device [facility]   Distance 30'   Stair Management Assistance Not tested   Balance   Static Sitting Fair +   Dynamic Sitting Fair   Static Standing Fair -   Dynamic Standing Poor +   Ambulatory Poor +   Endurance Deficit   Endurance Deficit Yes   Endurance Deficit Description decreased activity tolerance   Activity Tolerance   Activity Tolerance Patient limited by fatigue   Medical Staff Made Aware POLINA Martines   Nurse Made Aware ADIA Marrufo   Assessment   Prognosis Good   Problem List Decreased strength;Decreased endurance; Impaired balance;Decreased mobility; Impaired sensation   Assessment Pt is 80 y o  female seen for PT evaluation s/p admit to [facility] on 9/7/2022 w/ Pneumonia  PT consulted to assess pt's functional mobility and d/c needs  Order placed for PT eval and tx, w/ up w/ A order  Comorbidities affecting pt's physical performance at time of assessment include: PNA, generalized weakness, HTN, CAD, Type 2 DM  PTA, pt was independent w/ all functional mobility w/ SPC, ambulates household distances, has 6 AMBAR, lives w/ daughter in one level house and retired  Personal factors affecting pt at time of IE include: inaccessible home environment, ambulating w/ assistive device, stairs to enter home, inability to ambulate household distances, inability to navigate level surfaces w/o external assistance, positive fall history, inability to perform IADLs and inability to perform ADLs  Please find objective findings from PT assessment regarding body systems outlined above with impairments and limitations including weakness, impaired balance, decreased endurance, gait deviations, decreased activity tolerance, decreased functional mobility tolerance, fall risk and SOB upon exertion  The following objective measures performed on IE also reveal limitations: Barthel Index: 45/100, Modified Karon: 4 (moderate/severe disability) and AM-PAC 6-Clicks: 15/20  Pt's clinical presentation is currently unstable/unpredictable seen in pt's presentation of continued need for medical management and monitoring, decreased strength and balance resulting in an increased risk for falls, decreased activity tolerance and endurance  Pt to benefit from continued PT tx to address deficits as defined above and maximize level of functional independent mobility and consistency  From PT/mobility standpoint, recommendation at time of d/c would be post acute rehabilitation services pending progress in order to facilitate return to PLOF  Barriers to Discharge Inaccessible home environment; Other (Comment)  (decline in mobility status)   Goals   Patient Goals To return home   STG Expiration Date 09/18/22   Short Term Goal #1 In 7-10 days: Perform all bed mobility tasks modified independent to decrease caregiver burden, Perform all transfers modified independent to improve independence, Ambulate > 50 ft  with least restrictive assistive device with close S w/o LOB and w/ normalized gait pattern 100% of the time, Tolerate 4 hr OOB to faciliate upright tolerance and PT to see and establish goals for stair negotiation when appropriate   Plan   Treatment/Interventions Functional transfer training;LE strengthening/ROM;ADL retraining; Therapeutic exercise; Endurance training;Patient/family training;Gait training;Bed mobility; Compensatory technique education;Spoke to nursing;OT   PT Frequency 3-5x/wk   Recommendation   PT Discharge Recommendation Post acute rehabilitation services   AM-PAC Basic Mobility Inpatient   Turning in Bed Without Bedrails 3   Lying on Back to Sitting on Edge of Flat Bed 3   Moving Bed to Chair 3   Standing Up From Chair 3   Walk in Room 3   Climb 3-5 Stairs 1   Basic Mobility Inpatient Raw Score 16   Basic Mobility Standardized Score 38 32   Highest Level Of Mobility   -[facility] Goal 5: Stand one or more mins   JH-HLM Achieved 6: Walk 10 steps or more   Modified Carver Scale   Modified Karon Scale 4   Barthel Index   Feeding 10   Bathing 0   Grooming Score 0   Dressing Score 5   Bladder Score 5   Bowels Score 10   Toilet Use Score 5   Transfers (Bed/Chair) Score 10   Mobility (Level Surface) Score 0   Stairs Score 0   Barthel Index Score 45       Sheron Braga, PT

## 2022-09-08 NOTE — ASSESSMENT & PLAN NOTE
Lab Results   Component Value Date    HGBA1C 8 0 (H) 08/15/2022       No results for input(s): POCGLU in the last 72 hours      Blood Sugar Average: Last 72 hrs:     · Holding home oral medications  · Due to lack of appetite, will decrease Lantus to 25 U HS (home dose Tresiba 50U HS)  · Start on SSI with Accu-Cheks  · Hypoglycemia protocol  · Diabetic diet

## 2022-09-08 NOTE — H&P
[address] 1940, 80 y o  female MRN: [id number]  Unit/Bed#: ED 25 Encounter: [id number]  Primary Care Provider: Vasu Beckham DO   Date and time admitted to hospital: 9/7/2022  7:16 PM    * Pneumonia  Assessment & Plan  Patient presenting to the ER for increasing shortness of breath  Patient reports her symptoms started about a week ago  Patient had a tele visit with her family medicine doctor today, who recommended that she reports the ED for evaluation  She denies any associated fever/chills, chest pain, abdominal pain, nausea/vomiting/diarrhea, urinary complaints at this time  · Currently oxygenating well on RA, O2 96%  · COVID negative  · CT C/A/P:  Multifocal pneumonia  · Due to medication allergy to penicillins and cephalosporins, will start patient on IV levofloxacin  · Does not meet sepsis criteria at this time  · Obtain sputum culture, urine strep and Legionella  · Continuous pulse ox  · Follow-up infectious labs    Generalized weakness  Assessment & Plan  · Patient reporting generalized weakness and fatigue  · Secondary to multifocal pneumonia, see treatment above  · Fall precautions  · PT/OT evaluation    Chronic constipation  Assessment & Plan  · Continue bowel regimen, Carafate, simethicone    Essential hypertension  Assessment & Plan  · BP well controlled  · Continue lisinopril and metoprolol  · Monitor vitals per routine    Type 2 diabetes mellitus without complication, with long-term current use of insulin (HCC)  Assessment & Plan  Lab Results   Component Value Date    HGBA1C 8 0 (H) 08/15/2022       No results for input(s): POCGLU in the last 72 hours      Blood Sugar Average: Last 72 hrs:     · Holding home oral medications  · Due to lack of appetite, will decrease Lantus to 25 U HS (home dose Tresiba 50U HS)  · Start on SSI with Accu-Cheks  · Hypoglycemia protocol  · Diabetic diet    Coronary artery disease involving coronary bypass graft of native heart without angina pectoris  Assessment & Plan  · Denies any chest pain  · Continue aspirin, beta-blocker and statin therapy      VTE Prophylaxis: Enoxaparin (Lovenox)  / sequential compression device   Code Status: Level 1 code  Discussion with family:  All patient questions answered to the best my ability    Anticipated Length of Stay:  Patient will be admitted on an Inpatient basis with an anticipated length of stay of  More than 2 midnights  Justification for Hospital Stay: Pneumonia    Total Time for Visit, including Counseling / Coordination of Care: 60 minutes  Greater than 50% of this total time spent on direct patient counseling and coordination of care  Chief Complaint:   SOB    History of Present Illness: Harry Cantor is a 80 y o  female who has a past medical history significant for hypertension, coronary artery disease, diabetes  Patient presenting to the ER for increasing shortness of breath  Patient reports her symptoms started about a week ago  Patient had a tele visit with her family medicine doctor today, who recommended that she reports the ED for evaluation  She denies any associated fever/chills, chest pain, abdominal pain, nausea/vomiting/diarrhea, urinary complaints at this time  Patient requiring medical admission for IV antibiotics and PT/OT evaluation  All patient questions answered the best my ability  Review of Systems:    Review of Systems   Constitutional: Positive for appetite change and fatigue  Negative for chills and fever  HENT: Negative for ear pain and sore throat  Eyes: Negative for pain and visual disturbance  Respiratory: Positive for cough and shortness of breath  Cardiovascular: Negative for chest pain and palpitations  Gastrointestinal: Negative for abdominal pain and vomiting  Genitourinary: Negative for dysuria and hematuria  Musculoskeletal: Negative for arthralgias and back pain     Skin: Negative for color change and rash  Neurological: Positive for weakness  Negative for seizures and syncope  All other systems reviewed and are negative  Past Medical and Surgical History:     Past Medical History:   Diagnosis Date    Diabetes mellitus ([address] )     Hyperlipidemia     Hypertension        Past Surgical History:   Procedure Laterality Date    CARDIAC SURGERY         Meds/Allergies:    Prior to Admission medications    Medication Sig Start Date End Date Taking? Authorizing Provider   Accu-Chek FastClix Lancets MISC Use    Historical Provider, MD   ascorbic Acid (VITAMIN C) 500 MG CPCR capsule, extended release: 500 mg 1 capsule twice a day by mouth    Historical Provider, MD   Aspirin Buf,CaCarb-MgCarb-MgO, 81 MG TABS Take 81 mg by mouth    Historical Provider, MD   azithromycin (Zithromax) 250 mg tablet Take 2 tablets (500 mg total) by mouth daily for 1 day, THEN 1 tablet (250 mg total) daily for 4 days   9/2/22 9/7/22  Mitali Garnett, DO   B Complex-Biotin-FA (HM Vitamin B100 Complex) TABS Take by mouth    Historical Provider, MD   benazepril (LOTENSIN) 10 mg tablet TAKE 1 TABLET BY MOUTH EVERY DAY 2/24/22   Vallorie Givens, DO   benzonatate (TESSALON) 200 MG capsule Take 1 capsule (200 mg total) by mouth 3 (three) times a day as needed for cough 8/16/22   Vallorie Givens, DO   Blood Glucose Monitoring Suppl (Accu-Chek Guide) w/Device KIT Use    Historical Provider, MD   Carboxymethylcellul-Glycerin (Refresh Optive) 0 5-0 9 % SOLN Apply to eye  Patient not taking: Reported on 8/29/2022    Historical Provider, MD   Cholecalciferol 50 MCG (2000 UT) CAPS Take 2,000 Units by mouth    Historical Provider, MD   cilostazol (PLETAL) 100 mg tablet Take 100 mg by mouth 2 (two) times a day 3/2/21   Historical Provider, MD   fluticasone (FLONASE) 50 mcg/act nasal spray 1 SPRAY INTO EACH NOSTRIL DAILY AT BEDTIME 4/30/22   GENNY Hillman   furosemide (LASIX) 20 mg tablet TAKE 1 TABLET BY MOUTH TWICE A DAY  Patient not taking: Reported on 8/29/2022 6/11/22   Jessie Ice, DO   glipiZIDE (GLUCOTROL XL) 5 mg 24 hr tablet  6/16/21   Historical Provider, MD   glucose blood (Accu-Chek Guide) test strip TEST 4 TIMES DAILY DX E11 65 3/29/21   Historical Provider, MD   glucose blood test strip Use 1 each daily as needed Use as instructed    Historical Provider, MD   ibuprofen (MOTRIN) 600 mg tablet Take 600 mg by mouth    Historical Provider, MD   insulin aspart (NovoLOG) 100 units/mL injection Inject under the skin    Historical Provider, MD   insulin degludec Latoya  FlexTouch) 100 units/mL injection pen Inject under the skin    Historical Provider, MD   lactulose 20 g/30 mL Take 30 mL (20 g total) by mouth 2 (two) times a day 8/16/22 9/15/22  Jessie Ice, DO   magnesium citrate solution Take 296 mL by mouth once for 1 dose 4/2/22 4/2/22  Lisa Ruvalcaba PA-C   magnesium hydroxide (MILK OF MAGNESIA) 400 mg/5 mL oral suspension Take by mouth daily as needed    Historical Provider, MD   metoprolol tartrate (LOPRESSOR) 25 mg tablet TAKE 1 TABLET BY MOUTH EVERY DAY 12/12/21   Jessie Ice, DO   montelukast (SINGULAIR) 10 mg tablet Take 1 tablet (10 mg total) by mouth daily at bedtime 8/13/22 11/11/22  GENNY Hernandez   NovoLOG FlexPen 100 units/mL injection pen 18 UNITS BEFORE MEALS   ISF 40/140 UP TP 75 UNITS PER DAY 4/17/21   Historical Provider, MD   ondansetron (ZOFRAN-ODT) 4 mg disintegrating tablet Take 1 tablet (4 mg total) by mouth every 6 (six) hours as needed for nausea or vomiting 6/29/21   Jessie Ice, DO   polyethylene glycol (GLYCOLAX) 17 GM/SCOOP powder Take 17 g by mouth daily    Historical Provider, MD   Probiotic Product (Align) 4 MG CAPS Take 1 capsule by mouth    Historical Provider, MD   simethicone (MYLICON) 80 mg chewable tablet Chew 80 mg    Historical Provider, MD   simvastatin (ZOCOR) 40 mg tablet TAKE 1 TABLET BY MOUTH EVERY DAY IN THE EVENING 9/7/22   Jessie Ice, DO sucralfate (CARAFATE) 1 g tablet TAKE 1 TABLET BY ORAL ROUTE 4 TIMES EVERY DAY ON AN EMPTY STOMACH 1 HOUR BEFORE MEALS AND AT BEDTIME 12/12/21   Albert Solis DO   triamcinolone (KENALOG) 0 1 % cream Apply topically 2 (two) times a day    Historical Provider, MD   zolpidem (AMBIEN) 5 mg tablet Take 5 mg by mouth daily at bedtime as needed for sleep    Historical Provider, MD   ammonium lactate (LAC-HYDRIN) 12 % cream Apply topically as needed for dry skin  Patient not taking: No sig reported 7/2/21 9/7/22  Albert Solis DO   meclizine (ANTIVERT) 25 mg tablet Take 1 tablet (25 mg total) by mouth every 12 (twelve) hours as needed for dizziness  Patient not taking: Reported on 8/29/2022 8/16/22 9/7/22  Albert Solis DO   simvastatin (ZOCOR) 40 mg tablet Take 1 tablet (40 mg total) by mouth every evening 8/13/22 9/7/22  GENNY Madera     I have reviewed home medications using allscripts  Allergies:    Allergies   Allergen Reactions    Keflex [Cephalexin] Hives    Penicillins Rash    Atorvastatin Other (See Comments)     Other reaction(s): Muscle pain  Other reaction(s): Muscle pain      Metformin Diarrhea     Severe diarrhea  Severe diarrhea         Social History:     Marital Status: Single   Occupation: NA  Patient Pre-hospital Living Situation: Home  Patient Pre-hospital Level of Mobility: Cane  Patient Pre-hospital Diet Restrictions: Diabetic  Substance Use History:   Social History     Substance and Sexual Activity   Alcohol Use Yes    Alcohol/week: 1 0 standard drink    Types: 1 Glasses of wine per week    Comment: rarely      Social History     Tobacco Use   Smoking Status Never Smoker   Smokeless Tobacco Never Used     Social History     Substance and Sexual Activity   Drug Use Never       Family History:    Family History   Problem Relation Age of Onset    Heart disease Mother     Diabetes Mother     Hypertension Mother     Heart disease Father     Hypertension Father  Diabetes Father        Physical Exam:     Vitals:   Blood Pressure: 163/70 (09/07/22 2206)  Pulse: 82 (09/07/22 2206)  Temperature: 98 7 °F (37 1 °C) (09/07/22 1828)  Temp Source: Oral (09/07/22 1828)  Respirations: 18 (09/07/22 2206)  SpO2: 96 % (09/07/22 2206)    Physical Exam  Vitals and nursing note reviewed  Constitutional:       General: She is not in acute distress  Appearance: She is well-developed  She is obese  She is ill-appearing  HENT:      Head: Normocephalic and atraumatic  Mouth/Throat:      Pharynx: Oropharynx is clear  Eyes:      Conjunctiva/sclera: Conjunctivae normal    Cardiovascular:      Rate and Rhythm: Normal rate and regular rhythm  Heart sounds: No murmur heard  Pulmonary:      Effort: Pulmonary effort is normal  No respiratory distress  Breath sounds: Rales present  Abdominal:      Palpations: Abdomen is soft  Tenderness: There is no abdominal tenderness  Musculoskeletal:      Cervical back: Neck supple  Right lower leg: No edema  Left lower leg: No edema  Skin:     General: Skin is warm and dry  Neurological:      Mental Status: She is alert and oriented to person, place, and time  Additional Data:     Lab Results: I have personally reviewed pertinent reports        Results from last 7 days   Lab Units 09/07/22 1953   WBC Thousand/uL 9 60   HEMOGLOBIN g/dL 13 3   HEMATOCRIT % 40 8   PLATELETS Thousands/uL 399*   BANDS PCT % 2   LYMPHO PCT % 28   MONO PCT % 10   EOS PCT % 5     Results from last 7 days   Lab Units 09/07/22 1953   SODIUM mmol/L 137   POTASSIUM mmol/L 3 7   CHLORIDE mmol/L 100   CO2 mmol/L 28   BUN mg/dL 11   CREATININE mg/dL 0 71   ANION GAP mmol/L 9   CALCIUM mg/dL 9 7   ALBUMIN g/dL 3 1*   TOTAL BILIRUBIN mg/dL 0 19*   ALK PHOS U/L 109   ALT U/L 27   AST U/L 14   GLUCOSE RANDOM mg/dL 140                 Results from last 7 days   Lab Units 09/07/22 2210   LACTIC ACID mmol/L 0 9       Imaging: I have personally reviewed pertinent reports  CT chest abdomen pelvis wo contrast   Final Result by Roberto Garcia MD (09/07 2154)      1  Multifocal pneumonia  2   No evidence of acute intra-abdominal or pelvic process  Workstation performed: [id number]         XR chest 2 views   ED Interpretation by Jovanni Garcia MD (1940)   Congested xray            EKG, Pathology, and Other Studies Reviewed on Admission:   · EKG: Reviewed    Allscripts / Epic Records Reviewed: Yes     ** Please Note: This note has been constructed using a voice recognition system   **

## 2022-09-08 NOTE — RESPIRATORY THERAPY NOTE
I attempted to place patient on CPAP machine and once she was on it, she immediately panicked and attempted to rip the mask off  She says it scared her because she's claustrophobic

## 2022-09-08 NOTE — ASSESSMENT & PLAN NOTE
· Patient reporting generalized weakness and fatigue  · Secondary to multifocal pneumonia, see treatment above  · Fall precautions, PT/OT evaluation

## 2022-09-08 NOTE — ASSESSMENT & PLAN NOTE
· BP well controlled  · Continue lisinopril and metoprolol  · Monitor vitals per routine    Blood pressure 128/65, pulse 83, temperature 97 9 °F (36 6 °C), resp  rate 17, height 5' 1" (1 549 m), weight 102 kg (225 lb), SpO2 93 %

## 2022-09-08 NOTE — PLAN OF CARE
Problem: MOBILITY - ADULT  Goal: Maintain or return to baseline ADL function  Description: INTERVENTIONS:  -  Assess patient's ability to carry out ADLs; assess patient's baseline for ADL function and identify physical deficits which impact ability to perform ADLs (bathing, care of mouth/teeth, toileting, grooming, dressing, etc )  - Assess/evaluate cause of self-care deficits   - Assess range of motion  - Assess patient's mobility; develop plan if impaired  - Assess patient's need for assistive devices and provide as appropriate  - Encourage maximum independence but intervene and supervise when necessary  - Involve family in performance of ADLs  - Assess for home care needs following discharge   - Consider OT consult to assist with ADL evaluation and planning for discharge  - Provide patient education as appropriate  9/8/2022 1912 by Saravanan Man RN  Outcome: Progressing  9/8/2022 1550 by Saravanan Man RN  Outcome: Progressing  Goal: Maintains/Returns to pre admission functional level  Description: INTERVENTIONS:  - Perform BMAT or MOVE assessment daily    - Set and communicate daily mobility goal to care team and patient/family/caregiver  - Collaborate with rehabilitation services on mobility goals if consulted  - Perform Range of Motion 2 times a day  - Reposition patient every 2 hours    - Dangle patient 2 times a day  - Stand patient 2 times a day  - Ambulate patient 2 times a day  - Out of bed to chair 2 times a day   - Out of bed for meals 2 times a day  - Out of bed for toileting  - Record patient progress and toleration of activity level   9/8/2022 1912 by Saravanan Man RN  Outcome: Progressing  9/8/2022 1550 by Saravanan Man RN  Outcome: Progressing     Problem: Potential for Falls  Goal: Patient will remain free of falls  Description: INTERVENTIONS:  - Educate patient/family on patient safety including physical limitations  - Instruct patient to call for assistance with activity   - Consult OT/PT to assist with strengthening/mobility   - Keep Call bell within reach  - Keep bed low and locked with side rails adjusted as appropriate  - Keep care items and personal belongings within reach  - Initiate and maintain comfort rounds  - Make Fall Risk Sign visible to staff  - Offer Toileting every 2 Hours, in advance of need  - Initiate/Maintain bed alarm  - Obtain necessary fall risk management equipment:   - Apply yellow socks and bracelet for high fall risk patients  - Consider moving patient to room near nurses station  9/8/2022 1912 by Dianelys Adler RN  Outcome: Progressing  9/8/2022 1550 by Dianelys Adler RN  Outcome: Progressing     Problem: Prexisting or High Potential for Compromised Skin Integrity  Goal: Skin integrity is maintained or improved  Description: INTERVENTIONS:  - Identify patients at risk for skin breakdown  - Assess and monitor skin integrity  - Assess and monitor nutrition and hydration status  - Monitor labs   - Assess for incontinence   - Turn and reposition patient  - Assist with mobility/ambulation  - Relieve pressure over bony prominences  - Avoid friction and shearing  - Provide appropriate hygiene as needed including keeping skin clean and dry  - Evaluate need for skin moisturizer/barrier cream  - Collaborate with interdisciplinary team   - Patient/family teaching  - Consider wound care consult   9/8/2022 1912 by Dianelys Adler RN  Outcome: Progressing  9/8/2022 1550 by Dianelys Adler RN  Outcome: Progressing     Problem: Nutrition/Hydration-ADULT  Goal: Nutrient/Hydration intake appropriate for improving, restoring or maintaining nutritional needs  Description: Monitor and assess patient's nutrition/hydration status for malnutrition  Collaborate with interdisciplinary team and initiate plan and interventions as ordered  Monitor patient's weight and dietary intake as ordered or per policy  Utilize nutrition screening tool and intervene as necessary   Determine patient's food preferences and provide high-protein, high-caloric foods as appropriate       INTERVENTIONS:  - Monitor oral intake, urinary output, labs, and treatment plans  - Assess nutrition and hydration status and recommend course of action  - Evaluate amount of meals eaten  - Assist patient with eating if necessary   - Allow adequate time for meals  - Recommend/ encourage appropriate diets, oral nutritional supplements, and vitamin/mineral supplements  - Order, calculate, and assess calorie counts as needed  - Recommend, monitor, and adjust tube feedings and TPN/PPN based on assessed needs  - Assess need for intravenous fluids  - Provide specific nutrition/hydration education as appropriate  - Include patient/family/caregiver in decisions related to nutrition  Outcome: Progressing

## 2022-09-08 NOTE — PLAN OF CARE
Problem: OCCUPATIONAL THERAPY ADULT  Goal: Performs self-care activities at highest level of function for planned discharge setting  See evaluation for individualized goals  Description: Treatment Interventions: ADL retraining, Functional transfer training, UE strengthening/ROM, Endurance training, Equipment evaluation/education, Patient/family training, Fine motor coordination activities, Compensatory technique education, Continued evaluation, Energy conservation          See flowsheet documentation for full assessment, interventions and recommendations  Note: Limitation: Decreased ADL status, Decreased endurance, Decreased UE ROM, Decreased UE strength, Decreased fine motor control, Decreased self-care trans, Decreased high-level ADLs  Prognosis: Good  Assessment: Patient is a 80 y o  female seen for OT evaluation s/p admit to [id number] [facility] on 9/7/2022 w/Pneumonia  Commorbidities affecting patient's functional performance at time of assessment include: Generalized weakness, essential HTN, Chronic constipation, Type 2 DM, CAD, presented to ED with SOB  Orders placed for OT evaluation and treatment  Performed at least two patient identifiers during session including name and wristband  Prior to admission, Patient reported she was independent with ADLs, needs assistance with IADLs  patient ambulates with a cane, mostly outdoors, keeps rollator in the car for use as needed  Patient lives with her daughter and brother, Patient drives and has had 1 recent fall  Personal factors affecting patient at time of initial evaluation include: limited caregiver support, steps to enter, difficulty performing ADLs and difficulty performing IADLs  Upon evaluation, patient requires minimal  assist for UB ADLs, moderate assist for LB ADLs    Occupational performance is affected by the following deficits: decreased functional use of BUEs, in hand manipulation deficit with impaired reach, grasp and coordination, decreased muscle strength, degenerative arthritic joint changes, impaired gross motor coordination, impaired fine motor coordination, dynamic sit/ stand balance deficit with poor standing tolerance time for self care and functional mobility and decreased activity tolerance  Patient to benefit from continued Occupational Therapy treatment while in the hospital to address deficits as defined above and maximize level of functional independence with ADLs and functional mobility  Occupational Performance areas to address include: bathing/ shower, dressing, toilet hygiene, transfer to all surfaces, functional ambulation, functional mobility, emergency response, health maintenance, medication routine/ management, IADLs: safety procedures and Leisure Participation  From OT standpoint, recommendation at time of d/c would be Short Term Rehab       OT Discharge Recommendation: Post acute rehabilitation services

## 2022-09-08 NOTE — RESPIRATORY THERAPY NOTE
RT Protocol Note  Marcelina Zamora 80 y o  female MRN: [id number]  Unit/Bed#: ED 25 Encounter: [id number]    Assessment    Principal Problem:    Pneumonia  Active Problems:    Coronary artery disease involving coronary bypass graft of native heart without angina pectoris    Type 2 diabetes mellitus without complication, with long-term current use of insulin (HCC)    Essential hypertension    Chronic constipation    Generalized weakness      Home Pulmonary Medications:     09/08/22 0100   Respiratory Protocol   Protocol Initiated? Yes   Protocol Selection Respiratory   Language Barrier? No   Medical & Social History Reviewed? Yes   Diagnostic Studies Reviewed? Yes   Physical Assessment Performed? Yes   Home Devices/Therapy BiPAP/CPAP   Respiratory Plan Mild Distress pathway     Home Devices/Therapy: BiPAP/CPAP    Past Medical History:   Diagnosis Date    [facility]     Hyperlipidemia     Hypertension      Social History     Socioeconomic History    Marital status: Single     Spouse name: None    Number of children: None    Years of education: None    Highest education level: None   Occupational History    None   Tobacco Use    Smoking status: Never Smoker    Smokeless tobacco: Never Used   Vaping Use    Vaping Use: Never used   Substance and Sexual Activity    Alcohol use:  Yes     Alcohol/week: 1 0 standard drink     Types: 1 Glasses of wine per week     Comment: rarely     Drug use: Never    Sexual activity: None   Other Topics Concern    None   Social History Narrative    None     Social Determinants of Health     Financial Resource Strain: Not on file   Food Insecurity: Not on file   Transportation Needs: Not on file   Physical Activity: Not on file   Stress: Not on file   Social Connections: Not on file   Intimate Partner Violence: Not on file   Housing Stability: Not on file       Subjective         Objective    Physical Exam:   Assessment Type: Assess only  General Appearance: Alert, Awake  Respiratory Pattern: Symmetrical, Normal  Chest Assessment: Chest expansion symmetrical  Bilateral Breath Sounds: Coarse  R Breath Sounds: Coarse  L Breath Sounds: Coarse  Location Specific: No  Cough: Non-productive, Strong, None    Vitals:  Blood pressure 133/59, pulse 84, temperature 98 7 °F (37 1 °C), temperature source Oral, resp  rate 20, SpO2 96 %  Imaging and other studies: I have personally reviewed pertinent reports  Plan    Respiratory Plan: Mild Distress pathway        Resp Comments: patient states that she uses flonase but no other respiratory drugs  Pt does not use Home oxygen bot has a cpap machine she hasnt used for more that a year

## 2022-09-08 NOTE — RESPIRATORY THERAPY NOTE
RT Protocol Note  Nitish Wright 80 y o  female MRN: [id number]  Unit/Bed#: ED 25 Encounter: [id number]    Assessment    Principal Problem:    Pneumonia  Active Problems:    Coronary artery disease involving coronary bypass graft of native heart without angina pectoris    Type 2 diabetes mellitus without complication, with long-term current use of insulin (HCC)    Essential hypertension    Chronic constipation    Generalized weakness      Home Pulmonary Medications:     09/07/22 2330   Respiratory Protocol   Protocol Initiated? No   Protocol Selection Respiratory   Language Barrier? No   Medical & Social History Reviewed? Yes   Diagnostic Studies Reviewed? Yes   Home Devices/Therapy BiPAP/CPAP   Respiratory Plan No distress/Pulmonary history   Respiratory Assessment   Assessment Type Assess only   General Appearance Alert; Awake   Respiratory Pattern Symmetrical;Normal   Chest Assessment Chest expansion symmetrical   Bilateral Breath Sounds Coarse   R Breath Sounds Coarse   L Breath Sounds Coarse   Location Specific No   Cough Non-productive;Strong;None   Resp Comments patient states that she uses flonase but no other respiratory drugs  Pt does not use Home oxygen bot has a cpap machine she hasnt used for more that a year  Additional Assessments   Pulse 80   Respirations 18   SpO2 96 %     Home Devices/Therapy: BiPAP/CPAP    Past Medical History:   Diagnosis Date    Diabetes mellitus ([facility] [address] )     Hyperlipidemia     Hypertension      Social History     Socioeconomic History    Marital status: Single     Spouse name: None    Number of children: None    Years of education: None    Highest education level: None   Occupational History    None   Tobacco Use    Smoking status: Never Smoker    Smokeless tobacco: Never Used   Vaping Use    Vaping Use: Never used   Substance and Sexual Activity    Alcohol use:  Yes     Alcohol/week: 1 0 standard drink     Types: 1 Glasses of wine per week     Comment: rarely     Drug use: Never    Sexual activity: None   Other Topics Concern    None   Social History Narrative    None     Social Determinants of Health     Financial Resource Strain: Not on file   Food Insecurity: Not on file   Transportation Needs: Not on file   Physical Activity: Not on file   Stress: Not on file   Social Connections: Not on file   Intimate Partner Violence: Not on file   Housing Stability: Not on file       Subjective         Objective    Physical Exam:   Assessment Type: Assess only  General Appearance: Alert, Awake  Respiratory Pattern: Symmetrical, Normal  Chest Assessment: Chest expansion symmetrical  Bilateral Breath Sounds: Coarse  R Breath Sounds: Coarse  L Breath Sounds: Coarse  Location Specific: No  Cough: Non-productive, Strong, None    Vitals:  Blood pressure 163/70, pulse 80, temperature 98 7 °F (37 1 °C), temperature source Oral, resp  rate 18, SpO2 96 %  Imaging and other studies: I have personally reviewed pertinent reports  Plan    Respiratory Plan: No distress/Pulmonary history        Resp Comments: patient states that she uses flonase but no other respiratory drugs  Pt does not use Home oxygen bot has a cpap machine she hasnt used for more that a year

## 2022-09-08 NOTE — OCCUPATIONAL THERAPY NOTE
Occupational Therapy Evaluation        Patient Name: Gaudencio RUCKER Date: 9/8/2022 09/08/22 1558   OT Last Visit   OT Visit Date 09/08/22   Note Type   Note type Evaluation   Restrictions/Precautions   Weight Bearing Precautions Per Order No   Braces or Orthoses Other (Comment)  (left foot arch support PRN)   Other Precautions Chair Alarm; Bed Alarm;Telemetry; Fall Risk   Pain Assessment   Pain Assessment Tool 0-10   Pain Score No Pain   Home Living   Type of [address] One level;Performs ADLs on one level  (5-6 AMBAR)   Bathroom Shower/Tub Tub/shower unit   Bathroom Toilet Standard   Bathroom Equipment Hand-held shower   [address]; Other (Comment)  (Rollator in the car)   Additional Comments ambulatory with SPC mostly outdoors, keeps rollator in the car for use as needed  Prior Function   Level of [place] Independent with ADLs and functional mobility; Needs assistance with IADLs   Lives With Daughter  (Brother (has Alzheimers))   Receives Help From Family  (daughter)   ADL Assistance Independent   IADLs Needs assistance   Falls in the last 6 months 1 to 4  (one fall reported)   Vocational Retired   Lifestyle   Autonomy Patient reported she was independent with ADLs, needs assistance with IADLs  patient ambulates with a cane, mostly outdoors, keeps rollator in the car for use as needed  Patient lives with her daughter and brother, Patient drives and has had 1 recent fall     Reciprocal Relationships Supportive family   Service to Others Retired   Psychosocial   Psychosocial (WDL) Judson Ahmadi "[address]" [address] 5  [address] [facility] 4  [address] [place] [address] 3  Moderate Assistance   [address] 3  Moderate [address] [place] [address] Minimal Assistance   Bed Mobility   Additional Comments received patient OOb to recliner chair  Transfers   Sit to Stand 4  Minimal assistance   Additional items Assist x 1; Armrests; Increased time required;Verbal cues   Stand to Sit 4  Minimal assistance   Additional items Increased time required;Verbal cues   Functional Mobility   Functional Mobility 4  Minimal assistance   Additional items Rolling walker   Balance   Static Sitting Fair +   Dynamic Sitting Fair   Static Standing Fair -   Dynamic Standing Poor +   Activity Tolerance   Activity Tolerance Patient limited by fatigue   RUE Assessment   RUE Assessment X  (limited overhead movements)   RUE Strength   RUE Overall Strength Deficits  (3+/5)   LUE Assessment   LUE Assessment X  (limited overhead movements)   LUE Strength   LUE Overall Strength Deficits  (3+/5)   Hand Function   Gross Motor Coordination Impaired   Fine Motor Coordination Impaired  (arthritic joint changes/ incomplete fist)   Sensation   Light Touch No apparent deficits  (BUEs)   Proprioception   Proprioception No apparent deficits   Vision-Basic Assessment   Current Vision Wears glasses only for reading   Patient Visual Report Other (Comment)  (no significant changes reported)   Cognition   Overall Cognitive Status WFL   Arousal/Participation Alert; Responsive; Cooperative   Attention Within functional limits   Orientation Level Oriented X4   Memory Within functional limits   Following Commands Follows all commands and directions without difficulty   Assessment   Limitation Decreased ADL status; Decreased endurance;Decreased UE ROM; Decreased UE strength;Decreased fine motor control;Decreased self-care trans;Decreased high-level ADLs   Prognosis Good   Assessment Patient is a 80 y o  female seen for OT evaluation s/p admit to [id number] [place] [address] on 9/7/2022 w/Pneumonia   Commorbidities affecting patient's functional performance at time of assessment include: Generalized weakness, essential HTN, Chronic constipation, Type 2 DM, CAD, presented to ED with SOB  Orders placed for OT evaluation and treatment  Performed at least two patient identifiers during session including name and wristband  Prior to admission, Patient reported she was independent with ADLs, needs assistance with IADLs  patient ambulates with a cane, mostly outdoors, keeps rollator in the car for use as needed  Patient lives with her daughter and brother, Patient drives and has had 1 recent fall  Personal factors affecting patient at time of initial evaluation include: limited caregiver support, steps to enter, difficulty performing ADLs and difficulty performing IADLs  Upon evaluation, patient requires minimal  assist for UB ADLs, moderate assist for LB ADLs  Occupational performance is affected by the following deficits: decreased functional use of BUEs, in hand manipulation deficit with impaired reach, grasp and coordination, decreased muscle strength, degenerative arthritic joint changes, impaired gross motor coordination, impaired fine motor coordination, dynamic sit/ stand balance deficit with poor standing tolerance time for self care and functional mobility and decreased activity tolerance  Patient to benefit from continued Occupational Therapy treatment while in the hospital to address deficits as defined above and maximize level of functional independence with ADLs and functional mobility  Occupational Performance areas to address include: bathing/ shower, dressing, toilet hygiene, transfer to all surfaces, functional ambulation, functional mobility, emergency response, health maintenance, medication routine/ management, IADLs: safety procedures and Leisure Participation  From OT standpoint, recommendation at time of d/c would be Short Term Rehab  Plan   Treatment Interventions ADL retraining;Functional transfer training;UE strengthening/ROM; Endurance training;Equipment evaluation/education;Patient/family training;Fine motor coordination activities; Compensatory technique education;Continued evaluation; Energy conservation   Goal Expiration Date 09/22/22   OT Frequency 3-5x/wk   Recommendation   OT Discharge Recommendation Post acute rehabilitation services   AM-PAC Daily Activity Inpatient   Lower Body Dressing 2   Bathing 2   Toileting 2   Upper Body Dressing 3   Grooming 3   Eating 4   Daily Activity Raw Score 16   Daily Activity Standardized Score (Calc for Raw Score >=11) 35 96   AM-PAC Applied Cognition Inpatient   Following a Speech/Presentation 4   Understanding Ordinary Conversation 4   Taking Medications 4   Remembering Where Things Are Placed or Put Away 4   Remembering List of 4-5 Errands 4   Taking Care of Complicated Tasks 4   Applied Cognition Raw Score 24   Applied Cognition Standardized Score 62 21   Barthel Index   Feeding 10   Bathing 0   Grooming Score 0   Dressing Score 5   Bladder Score 5   Bowels Score 10   Toilet Use Score 5   Transfers (Bed/Chair) Score 10   Mobility (Level Surface) Score 0   Stairs Score 0   Barthel Index Score 45         1 - Patient will verbalize and demonstrate use of energy conservation/ deep breathing technique and work simplification skills during functional activity with no verbal cues  2 - Patient will verbalize and demonstrate good body mechanics and joint protection techniques during  ADLs/ IADLs with no verbal cues  3 - Patient will increase OOB/ sitting tolerance to 2-4 hours per day for increased participation in self care and leisure tasks with no s/s of exertion  4 - Patient will increase standing tolerance time to 5  minutes with unilateral UE support to complete sink level ADLs@ mod I level  5 - Patient will increase sitting tolerance at edge of bed to 20 minutes to complete UB ADLs @ set up assist level  6 - Patient will transfer bed to Chair / toilet at Set up assist level with AD as indicated       7 - Patient will complete UB ADLs with set up assist      8 - Patient will complete LB ADLs with min assist with the use of adaptive equipment       9 - Patient will complete toileting hygiene with set up assist/ supervision for thoroughness    10 - Patient/ Family  will demonstrate competency with UE Home Exercise Program

## 2022-09-08 NOTE — ASSESSMENT & PLAN NOTE
· Patient reporting generalized weakness and fatigue  · Secondary to multifocal pneumonia, see treatment above  · Fall precautions  · PT/OT evaluation

## 2022-09-08 NOTE — ASSESSMENT & PLAN NOTE
Patient presenting to the ER for increasing shortness of breath  Patient reports her symptoms started about a week ago  Patient had a tele visit with her family medicine doctor today, who recommended that she reports the ED for evaluation  She denies any associated fever/chills, chest pain, abdominal pain, nausea/vomiting/diarrhea, urinary complaints at this time    · Currently oxygenating well on RA, O2 96%  · COVID negative  · CT C/A/P:  Multifocal pneumonia  · Due to medication allergy to penicillins and cephalosporins, will start patient on IV levofloxacin  · Does not meet sepsis criteria at this time  · Obtain sputum culture, urine strep and Legionella  · Continuous pulse ox, Follow-up infectious labs

## 2022-09-08 NOTE — ASSESSMENT & PLAN NOTE
Patient presenting to the ER for increasing shortness of breath  Patient reports her symptoms started about a week ago  Patient had a tele visit with her family medicine doctor today, who recommended that she reports the ED for evaluation  She denies any associated fever/chills, chest pain, abdominal pain, nausea/vomiting/diarrhea, urinary complaints at this time    · Currently oxygenating well on RA, O2 96%  · CT C/A/P:  Multifocal pneumonia  · Due to medication allergy to penicillins and cephalosporins, will start patient on IV levofloxacin  · Does not meet sepsis criteria at this time  · Obtain sputum culture, urine strep and Legionella  · Continuous pulse ox  · Follow-up infectious labs

## 2022-09-08 NOTE — ASSESSMENT & PLAN NOTE
Lab Results   Component Value Date    HGBA1C 8 0 (H) 08/15/2022       Recent Labs     09/07/22  2306 09/08/22  0711 09/08/22  1102   POCGLU 119 141* 201*       Blood Sugar Average: Last 72 hrs:  (P) 806 [id number]   · Holding home oral medications  · Due to lack of appetite, will decrease Lantus to 25 U HS (home dose Tresiba 50U HS)  · Start on SSI with Accu-Cheks  · Hypoglycemia protocol  · Diabetic diet

## 2022-09-08 NOTE — PROGRESS NOTES
[facility]  Progress Note - Blaise Boone 1940, 80 y o  female MRN: [id number]  Unit/Bed#: -01 Encounter: [id number]  Primary Care Provider: Bonita Sewell DO   Date and time admitted to hospital: 9/7/2022  7:16 PM    Morbid obesity ([address] )  Assessment & Plan  · Counseling about weight loss, diet and exercise    Generalized weakness  Assessment & Plan  · Patient reporting generalized weakness and fatigue  · Secondary to multifocal pneumonia, see treatment above  · Fall precautions, PT/OT evaluation    Chronic constipation  Assessment & Plan  · Continue bowel regimen, Carafate, simethicone  Essential hypertension  Assessment & Plan  · BP well controlled  · Continue lisinopril and metoprolol  · Monitor vitals per routine    Blood pressure 128/65, pulse 83, temperature 97 9 °F (36 6 °C), resp  rate 17, height 5' 1" (1 549 m), weight 102 kg (225 lb), SpO2 93 %  Type 2 diabetes mellitus without complication, with long-term current use of insulin [facility])  Assessment & Plan  Lab Results   Component Value Date    HGBA1C 8 0 (H) 08/15/2022       Recent Labs     09/07/22  2306 09/08/22  0711 09/08/22  1102   POCGLU 119 141* 201*       Blood Sugar Average: Last 72 hrs:  (P) 677 [id number]   · Holding home oral medications  · Due to lack of appetite, will decrease Lantus to 25 U HS (home dose Tresiba 50U HS)  · Start on SSI with Accu-Cheks  · Hypoglycemia protocol  · Diabetic diet  Coronary artery disease involving coronary bypass graft of native heart without angina pectoris  Assessment & Plan  · Denies any chest pain  · Continue aspirin, beta-blocker and statin therapy  * Pneumonia  Assessment & Plan  Patient presenting to the ER for increasing shortness of breath  Patient reports her symptoms started about a week ago  Patient had a tele visit with her family medicine doctor today, who recommended that she reports the ED for evaluation    She denies any associated fever/chills, chest pain, abdominal pain, nausea/vomiting/diarrhea, urinary complaints at this time  · Currently oxygenating well on RA, O2 96%  · COVID negative  · CT C/A/P:  Multifocal pneumonia  · Due to medication allergy to penicillins and cephalosporins, will start patient on IV levofloxacin  · Does not meet sepsis criteria at this time  · Obtain sputum culture, urine strep and Legionella  · Continuous pulse ox, Follow-up infectious labs      TE Pharmacologic Prophylaxis: Enoxaparin (Lovenox)    Patient Centered Rounds: I have performed bedside rounds with nursing staff today  Discussions with Specialists or Other Care Team Provider:  Care team  Education and Discussions with Family / Patient:  Patient    Current Length of Stay: 1 day(s)  Current Patient Status: Inpatient     Certification Statement: The patient will continue to require additional inpatient hospital stay due to Pneumonia  Discharge Plan / Estimated Discharge Date:  1-2 more days    Code Status: Level 1 - Full Code  ______________________________________________________________________________    Subjective:   Patient seen and examined by me  Patient is feeling weak and the weakness is generalized  Does have some shortness of breath which is present at rest and increases on exertion  No fever or chills at this point of time  No chest pain  No dysuria or diarrhea    Objective:   Vitals: Blood pressure 128/65, pulse 83, temperature 97 9 °F (36 6 °C), resp  rate 17, height 5' 1" (1 549 m), weight 102 kg (225 lb), SpO2 93 %      Physical Exam:   General appearance: alert, appears stated age and cooperative  Constitutional- looks a little weak  HEENT - atraumatic and normocephalic  Neck- supple  Skin - no fresh rash  Extremities no fresh focal deformities  Cardiovascular- S1-S2 heard  Respiratory- bilateral air entry present, no crackles or rhonchi  Skin - no fresh rash  Abdomen - normal bowel sounds present, no rebound tenderness  CNS- No fresh focal deficits  Psych- no acute psychosis     Additional Data:   Labs:  Results from last 7 days   Lab Units 09/08/22  0503 09/07/22  1953   WBC Thousand/uL 8 34 9 60   HEMOGLOBIN g/dL 12 4 13 3   HEMATOCRIT % 38 2 40 8   MCV fL 89 90   TOTAL NEUT ABS Thousand/uL  --  5 38   BANDS PCT %  --  2   PLATELETS Thousands/uL 365 399*     Results from last 7 days   Lab Units 09/08/22  0503 09/07/22  1953   SODIUM mmol/L 139 137   POTASSIUM mmol/L 3 9 3 7   CHLORIDE mmol/L 103 100   CO2 mmol/L 26 28   ANION GAP mmol/L 10 9   BUN mg/dL 10 11   CREATININE mg/dL 0 63 0 71   CALCIUM mg/dL 9 2 9 7   ALBUMIN g/dL 2 7* 3 1*   TOTAL BILIRUBIN mg/dL 0 23 0 19*   ALK PHOS U/L 87 109   ALT U/L 24 27   AST U/L 13 14   EGFR ml/min/1 73sq m 85 81   GLUCOSE RANDOM mg/dL 135 140     Results from last 7 days   Lab Units 09/07/22 1953   MAGNESIUM mg/dL 1 8         Results from last 7 days   Lab Units 09/07/22  1953   NT-PRO BNP pg/mL 261      Results from last 7 days   Lab Units 09/08/22  0503 09/07/22  2355 09/07/22  2210   LACTIC ACID mmol/L  --   --  0 9   PROCALCITONIN ng/ml 0 17   < >  --     < > = values in this interval not displayed  Results from last 7 days   Lab Units 09/08/22  1102 09/08/22  0711 09/07/22  2306   POC GLUCOSE mg/dl 201* 141* 119             * I Have Reviewed All Lab Data Listed Above  Cultures:   Results from last 7 days   Lab Units 09/08/22  0129 09/07/22  2210 09/07/22  2131   BLOOD CULTURE   --  Received in Microbiology Lab  Culture in Progress  Received in Microbiology Lab  Culture in Progress    --    INFLUENZA A PCR   --   --  Negative   LEGIONELLA URINARY ANTIGEN  Negative  --   --    STREP PNEUMONIAE ANTIGEN, URINE  Negative  --   --      Results from last 7 days   Lab Units 09/07/22  2131   INFLUENZA A PCR  Negative   INFLUENZA B PCR  Negative   RSV PCR  Negative         Imaging:  Imaging Reports Reviewed Today Include:   CT chest abdomen pelvis wo contrast    Result Date: 9/7/2022  Impression: 1  Multifocal pneumonia  2   No evidence of acute intra-abdominal or pelvic process  Workstation performed: [id number]     XR chest 2 views    Result Date: 9/8/2022  Impression: Infiltrate in the posterior aspect of the right upper lobe  Workstation performed: [id number]     Scheduled Meds:  Current Facility-Administered Medications   Medication Dose Route Frequency Provider Last Rate    acetaminophen  650 mg Oral Q6H PRN Vevelyn Pillar, AZUCENA      ascorbic acid  500 mg Oral BID Vevelyn Pillar, AZUCENA      aspirin  81 mg Oral Daily Emilia Sutherland PA-C      benzonatate  200 mg Oral TID PRN Vevelyn Pillar, AZUCENA      cilostazol  100 mg Oral BID Vevelyn Pillar, AZUCENA      enoxaparin  40 mg Subcutaneous Q12H [address] Emilia Sutherland PA-C      furosemide  20 mg Oral BID PRN Vevelyn Pillar, AZUCENA      guaiFENesin  600 mg Oral BID Vevelyn PillAZUCENA monzon      insulin glargine  25 Units Subcutaneous HS Emilia Sutherland PA-C      insulin lispro  1-5 Units Subcutaneous HS Emilia Sutherland PA-C      insulin lispro  1-6 Units Subcutaneous TID AC Emilia Sutherland PA-C      levofloxacin  750 mg Intravenous Q24H Emilia Sutherland PA-C 750 mg (09/07/22 2356)    lisinopril  10 mg Oral Daily Emilia Sutherland PA-C      magnesium hydroxide  15 mL Oral Daily PRN Soto Willams PA-C      melatonin  3 mg Oral HS Emilia Sutherland PA-C      metoprolol tartrate  25 mg Oral Daily Emilia Sutherland PA-C      pravastatin  80 mg Oral Daily With [employer]CE[employer]      simethicone  80 mg Oral Q6H PRN Vevelyn PillAZUCENA monzon      sucralfate  1 g Oral 4x Daily (AC & HS) Vevelyn Pillar, PA-C Leopold Ewings, MD  [facility] Internal Medicine    ** Please Note: This note has been constructed using a voice recognition system   **

## 2022-09-08 NOTE — PLAN OF CARE
Problem: PHYSICAL THERAPY ADULT  Goal: Performs mobility at highest level of function for planned discharge setting  See evaluation for individualized goals  Description: Treatment/Interventions: Functional transfer training, LE strengthening/ROM, ADL retraining, Therapeutic exercise, Endurance training, Patient/family training, Gait training, Bed mobility, Compensatory technique education, Spoke to nursing, OT          See flowsheet documentation for full assessment, interventions and recommendations  Outcome: Progressing  Note: Prognosis: Good  Problem List: Decreased strength, Decreased endurance, Impaired balance, Decreased mobility, Impaired sensation  Assessment: Pt is 80 y o  female seen for PT evaluation s/p admit to [facility] on 9/7/2022 w/ Pneumonia  PT consulted to assess pt's functional mobility and d/c needs  Order placed for PT eval and tx, w/ up w/ A order  Comorbidities affecting pt's physical performance at time of assessment include: PNA, generalized weakness, HTN, CAD, Type 2 DM  PTA, pt was independent w/ all functional mobility w/ SPC, ambulates household distances, has 6 AMBAR, lives w/ daughter in one level house and retired  Personal factors affecting pt at time of IE include: inaccessible home environment, ambulating w/ assistive device, stairs to enter home, inability to ambulate household distances, inability to navigate level surfaces w/o external assistance, positive fall history, inability to perform IADLs and inability to perform ADLs  Please find objective findings from PT assessment regarding body systems outlined above with impairments and limitations including weakness, impaired balance, decreased endurance, gait deviations, decreased activity tolerance, decreased functional mobility tolerance, fall risk and SOB upon exertion   The following objective measures performed on IE also reveal limitations: Barthel Index: 45/100, Modified [place]: 4 (moderate/severe disability) and AM-PAC 6-Clicks: 76/60  Pt's clinical presentation is currently unstable/unpredictable seen in pt's presentation of continued need for medical management and monitoring, decreased strength and balance resulting in an increased risk for falls, decreased activity tolerance and endurance  Pt to benefit from continued PT tx to address deficits as defined above and maximize level of functional independent mobility and consistency  From PT/mobility standpoint, recommendation at time of d/c would be post acute rehabilitation services pending progress in order to facilitate return to PLOF  Barriers to Discharge: Inaccessible home environment, Other (Comment) (decline in mobility status)     PT Discharge Recommendation: Post acute rehabilitation services    See flowsheet documentation for full assessment

## 2022-09-09 DIAGNOSIS — K59.09 CHRONIC CONSTIPATION: ICD-10-CM

## 2022-09-09 LAB
ANION GAP SERPL CALCULATED.3IONS-SCNC: 12 MMOL/L (ref 4–13)
BUN SERPL-MCNC: 14 MG/DL (ref 5–25)
CALCIUM SERPL-MCNC: 9.5 MG/DL (ref 8.3–10.1)
CHLORIDE SERPL-SCNC: 100 MMOL/L (ref 96–108)
CO2 SERPL-SCNC: 24 MMOL/L (ref 21–32)
CREAT SERPL-MCNC: 0.72 MG/DL (ref 0.6–1.3)
ERYTHROCYTE [DISTWIDTH] IN BLOOD BY AUTOMATED COUNT: 13.3 % (ref 11.6–15.1)
GFR SERPL CREATININE-BSD FRML MDRD: 79 ML/MIN/1.73SQ M
GLUCOSE SERPL-MCNC: 135 MG/DL (ref 65–140)
GLUCOSE SERPL-MCNC: 139 MG/DL (ref 65–140)
GLUCOSE SERPL-MCNC: 142 MG/DL (ref 65–140)
GLUCOSE SERPL-MCNC: 169 MG/DL (ref 65–140)
GLUCOSE SERPL-MCNC: 192 MG/DL (ref 65–140)
HCT VFR BLD AUTO: 38.6 % (ref 34.8–46.1)
HGB BLD-MCNC: 13.1 G/DL (ref 11.5–15.4)
MCH RBC QN AUTO: 29.4 PG (ref 26.8–34.3)
MCHC RBC AUTO-ENTMCNC: 33.9 G/DL (ref 31.4–37.4)
MCV RBC AUTO: 87 FL (ref 82–98)
PLATELET # BLD AUTO: 391 THOUSANDS/UL (ref 149–390)
PMV BLD AUTO: 9.7 FL (ref 8.9–12.7)
POTASSIUM SERPL-SCNC: 4.3 MMOL/L (ref 3.5–5.3)
RBC # BLD AUTO: 4.46 MILLION/UL (ref 3.81–5.12)
SODIUM SERPL-SCNC: 136 MMOL/L (ref 135–147)
WBC # BLD AUTO: 7.99 THOUSAND/UL (ref 4.31–10.16)

## 2022-09-09 PROCEDURE — 82948 REAGENT STRIP/BLOOD GLUCOSE: CPT

## 2022-09-09 PROCEDURE — 85027 COMPLETE CBC AUTOMATED: CPT | Performed by: INTERNAL MEDICINE

## 2022-09-09 PROCEDURE — 80048 BASIC METABOLIC PNL TOTAL CA: CPT | Performed by: INTERNAL MEDICINE

## 2022-09-09 PROCEDURE — 99232 SBSQ HOSP IP/OBS MODERATE 35: CPT | Performed by: INTERNAL MEDICINE

## 2022-09-09 RX ORDER — IBUPROFEN 400 MG/1
400 TABLET ORAL ONCE
Status: COMPLETED | OUTPATIENT
Start: 2022-09-09 | End: 2022-09-09

## 2022-09-09 RX ORDER — IBUPROFEN 600 MG/1
600 TABLET ORAL ONCE
Status: COMPLETED | OUTPATIENT
Start: 2022-09-09 | End: 2022-09-09

## 2022-09-09 RX ORDER — LACTULOSE 10 G/15ML
SOLUTION ORAL
Qty: 5400 ML | Refills: 1 | Status: SHIPPED | OUTPATIENT
Start: 2022-09-09 | End: 2022-09-10

## 2022-09-09 RX ADMIN — INSULIN LISPRO 1 UNITS: 100 INJECTION, SOLUTION INTRAVENOUS; SUBCUTANEOUS at 21:00

## 2022-09-09 RX ADMIN — SUCRALFATE 1 G: 1 TABLET ORAL at 16:36

## 2022-09-09 RX ADMIN — OXYCODONE HYDROCHLORIDE AND ACETAMINOPHEN 500 MG: 500 TABLET ORAL at 09:11

## 2022-09-09 RX ADMIN — SALINE NASAL SPRAY 2 SPRAY: 1.5 SOLUTION NASAL at 06:14

## 2022-09-09 RX ADMIN — BENZONATATE 200 MG: 100 CAPSULE ORAL at 21:00

## 2022-09-09 RX ADMIN — SUCRALFATE 1 G: 1 TABLET ORAL at 21:00

## 2022-09-09 RX ADMIN — INSULIN GLARGINE 25 UNITS: 100 INJECTION, SOLUTION SUBCUTANEOUS at 21:00

## 2022-09-09 RX ADMIN — ENOXAPARIN SODIUM 40 MG: 40 INJECTION SUBCUTANEOUS at 09:12

## 2022-09-09 RX ADMIN — ACETAMINOPHEN 650 MG: 325 TABLET ORAL at 21:00

## 2022-09-09 RX ADMIN — CILOSTAZOL 100 MG: 100 TABLET ORAL at 09:12

## 2022-09-09 RX ADMIN — SALINE NASAL SPRAY 2 SPRAY: 1.5 SOLUTION NASAL at 21:05

## 2022-09-09 RX ADMIN — SUCRALFATE 1 G: 1 TABLET ORAL at 11:12

## 2022-09-09 RX ADMIN — CILOSTAZOL 100 MG: 100 TABLET ORAL at 19:29

## 2022-09-09 RX ADMIN — LISINOPRIL 10 MG: 10 TABLET ORAL at 09:12

## 2022-09-09 RX ADMIN — IBUPROFEN 600 MG: 400 TABLET, FILM COATED ORAL at 03:45

## 2022-09-09 RX ADMIN — MAGNESIUM HYDROXIDE 15 ML: 400 SUSPENSION ORAL at 21:00

## 2022-09-09 RX ADMIN — PRAVASTATIN SODIUM 80 MG: 80 TABLET ORAL at 16:36

## 2022-09-09 RX ADMIN — ASPIRIN 81 MG: 81 TABLET, CHEWABLE ORAL at 09:12

## 2022-09-09 RX ADMIN — SALINE NASAL SPRAY 2 SPRAY: 1.5 SOLUTION NASAL at 00:39

## 2022-09-09 RX ADMIN — POLYETHYLENE GLYCOL 3350 17 G: 17 POWDER, FOR SOLUTION ORAL at 16:36

## 2022-09-09 RX ADMIN — LEVOFLOXACIN 750 MG: 750 INJECTION, SOLUTION INTRAVENOUS at 22:55

## 2022-09-09 RX ADMIN — BENZONATATE 200 MG: 100 CAPSULE ORAL at 09:12

## 2022-09-09 RX ADMIN — POLYETHYLENE GLYCOL 3350 17 G: 17 POWDER, FOR SOLUTION ORAL at 11:37

## 2022-09-09 RX ADMIN — IBUPROFEN 400 MG: 400 TABLET, FILM COATED ORAL at 03:45

## 2022-09-09 RX ADMIN — METOPROLOL TARTRATE 25 MG: 25 TABLET, FILM COATED ORAL at 09:12

## 2022-09-09 RX ADMIN — INSULIN LISPRO 2 UNITS: 100 INJECTION, SOLUTION INTRAVENOUS; SUBCUTANEOUS at 09:12

## 2022-09-09 RX ADMIN — OXYCODONE HYDROCHLORIDE AND ACETAMINOPHEN 500 MG: 500 TABLET ORAL at 19:29

## 2022-09-09 RX ADMIN — GUAIFENESIN 600 MG: 600 TABLET, EXTENDED RELEASE ORAL at 19:29

## 2022-09-09 RX ADMIN — ENOXAPARIN SODIUM 40 MG: 40 INJECTION SUBCUTANEOUS at 21:00

## 2022-09-09 RX ADMIN — SUCRALFATE 1 G: 1 TABLET ORAL at 05:05

## 2022-09-09 RX ADMIN — Medication 3 MG: at 21:00

## 2022-09-09 RX ADMIN — GUAIFENESIN 600 MG: 600 TABLET, EXTENDED RELEASE ORAL at 09:12

## 2022-09-09 NOTE — CASE MANAGEMENT
Case Management Assessment & Discharge Planning Note    Patient name Fior Half  Location [address] [id number] MRN [id number]  : 1940 Date 2022       Current Admission Date: 2022  Current Admission Diagnosis:Pneumonia   Patient Active Problem List    Diagnosis Date Noted    Morbid obesity ([facility] ) 2022    Pneumonia 2022    Generalized weakness 2022    Post-nasal drip 04/10/2022    PVD (peripheral vascular disease) ([place] [address] ) 2021    Body mass index (BMI) of 40 0 to 44 9 in adult [facility]) 2021    Coronary artery disease involving coronary bypass graft of native heart without angina pectoris 2021    Type 2 diabetes mellitus without complication, with long-term current use of insulin ([place] [address] ) 2021    Essential hypertension 2021    Chronic constipation 2021      LOS (days): 2  Geometric Mean LOS (GMLOS) (days): 3 10  Days to GMLOS:1 5     OBJECTIVE:    Risk of Unplanned Readmission Score: 10 93         Current admission status: Inpatient       Preferred Pharmacy:   [facility] [address] [place] [address] [address] [place] [address] Drive  Phone: [phone] Fax: [phone]    Primary Care Provider: Johanny Nettles DO    Primary Insurance: MEDICARE  Secondary Insurance: AEELISSA    ASSESSMENT:  Shannan 26 Proxies    There are no active Health Care Proxies on file  Readmission Root Cause  30 Day Readmission: No    Patient Information  Admitted from[de-identified] Home  Mental Status: Alert  During Assessment patient was accompanied by: Not accompanied during assessment  Assessment information provided by[de-identified] Patient  Primary Caregiver: Self  Support Systems: Family members, Children  What city do you live in?: [address] entry access options   Select all that apply : Stairs  Number of steps to enter home : 5  Do the steps have railings?: Yes  Type of Current Residence: [facility]  In the last 12 months, was there a time when you were not able to pay the mortgage or rent on time?: No  In the last 12 months, how many places have you lived?: 1  In the last 12 months, was there a time when you did not have a steady place to sleep or slept in a shelter (including now)?: No  Homeless/housing insecurity resource given?: N/A  Living Arrangements: Lives w/ Extended Family  Is patient a ?: No    Activities of Daily Living Prior to Admission  Functional Status: Independent  Completes ADLs independently?: Yes  Ambulates independently?: Yes  Does patient use assisted devices?: Yes  Assisted Devices (DME) used: Rishabh Womack  Does patient currently own DME?: Yes  What DME does the patient currently own?: [employer]  Does patient have a history of Outpatient Therapy (PT/OT)?: No  Does the patient have a history of Short-Term Rehab?: Yes (DONT REMEMBER IT WAS [address] [place]   DOES NOT [facility] AT THIS TIME )  Does patient have a history of HHC?: No  Does patient currently have RIWI ?: No         Patient Information Continued  Income Source: Pension/intermediate  Does patient have prescription coverage?: Yes  Within the past 12 months, you worried that your food would run out before you got the money to buy more : Never true  Within the past 12 months, the food you bought just didn't last and you didn't have money to get more : Never true  Food insecurity resource given?: N/A  Does patient receive dialysis treatments?: No  Does patient have a history of substance abuse?: No  Does patient have a history of Mental Health Diagnosis?: No         Means of Transportation  [facility] Self  In the past 12 months, has lack of transportation kept you from medical appointments or from getting medications?: No  In the past 12 months, has lack of transportation kept you from meetings, work, or from getting things needed for daily living?: No  Was application for public transport provided?: N/A        DISCHARGE DETAILS:    Discharge planning discussed with[de-identified] PATIENT AT BEDSIDE  Freedom of Choice: Yes  Comments - Freedom of Choice: CM DISCUSSED FREEDOM OF CHOICE  PT ADAMANTLY DOES NOT WANT STR  BUT DESIRES [facility] SERVICES  CM contacted family/caregiver?: No- see comments (DECLINES)  Were Treatment Team discharge recommendations reviewed with patient/caregiver?: Yes  Did patient/caregiver verbalize understanding of patient care needs?: Yes  Were patient/caregiver advised of the risks associated with not following Treatment Team discharge recommendations?: Yes         Requested [address]         Is the patient interested in [facility] [place] at discharge?: Yes  [address] requested[de-identified] Nursing, Occupational Therapy, Physical [address] Name[de-identified] [address] Provider[de-identified] Referring Provider  Home Health Services Needed[de-identified] Evaluate Functional Status and Safety, Gait/ADL Training, Strengthening/Theraputic Exercises to Improve Function  Homebound Criteria Met[de-identified] Uses an Assist Device (i e  cane, walker, etc)  Supporting Clincal Findings[de-identified] Limited Endurance         Other Referral/Resources/Interventions Provided:  Interventions: C  Referral Comments: REFERRAL TO [facility] VIA NEIDA AWAKAROL[facility] INTAKE DETERMINATION      Would you like to participate in our [address] service program?  : No - Declined    Treatment Team Recommendation: Home, Home with [address]  Discharge Destination Plan[de-identified] Home, Home with Delia at Discharge : Family

## 2022-09-09 NOTE — PLAN OF CARE
Problem: MOBILITY - ADULT  Goal: Maintain or return to baseline ADL function  Description: INTERVENTIONS:  -  Assess patient's ability to carry out ADLs; assess patient's baseline for ADL function and identify physical deficits which impact ability to perform ADLs (bathing, care of mouth/teeth, toileting, grooming, dressing, etc )  - Assess/evaluate cause of self-care deficits   - Assess range of motion  - Assess patient's mobility; develop plan if impaired  - Assess patient's need for assistive devices and provide as appropriate  - Encourage maximum independence but intervene and supervise when necessary  - Involve family in performance of ADLs  - Assess for home care needs following discharge   - Consider OT consult to assist with ADL evaluation and planning for discharge  - Provide patient education as appropriate  Outcome: Progressing  Goal: Maintains/Returns to pre admission functional level  Description: INTERVENTIONS:  - Perform BMAT or MOVE assessment daily    - Set and communicate daily mobility goal to care team and patient/family/caregiver  - Collaborate with rehabilitation services on mobility goals if consulted  - Perform Range of Motion 3 times a day  - Reposition patient every 3 hours    - Dangle patient 3 times a day  - Stand patient 3 times a day  - Ambulate patient 3 times a day  - Out of bed to chair 3 times a day   - Out of bed for meals 3 times a day  - Out of bed for toileting  - Record patient progress and toleration of activity level   Outcome: Progressing     Problem: Potential for Falls  Goal: Patient will remain free of falls  Description: INTERVENTIONS:  - Educate patient/family on patient safety including physical limitations  - Instruct patient to call for assistance with activity   - Consult OT/PT to assist with strengthening/mobility   - Keep Call bell within reach  - Keep bed low and locked with side rails adjusted as appropriate  - Keep care items and personal belongings within reach  - Initiate and maintain comfort rounds  - Make Fall Risk Sign visible to staff  - Offer Toileting every 2 Hours, in advance of need  - Initiate/Maintain bed alarm  - Obtain necessary fall risk management equipment  - Apply yellow socks and bracelet for high fall risk patients  - Consider moving patient to room near nurses station  Outcome: Progressing     Problem: Prexisting or High Potential for Compromised Skin Integrity  Goal: Skin integrity is maintained or improved  Description: INTERVENTIONS:  - Identify patients at risk for skin breakdown  - Assess and monitor skin integrity  - Assess and monitor nutrition and hydration status  - Monitor labs   - Assess for incontinence   - Turn and reposition patient  - Assist with mobility/ambulation  - Relieve pressure over bony prominences  - Avoid friction and shearing  - Provide appropriate hygiene as needed including keeping skin clean and dry  - Evaluate need for skin moisturizer/barrier cream  - Collaborate with interdisciplinary team   - Patient/family teaching  - Consider wound care consult   Outcome: Progressing     Problem: Nutrition/Hydration-ADULT  Goal: Nutrient/Hydration intake appropriate for improving, restoring or maintaining nutritional needs  Description: Monitor and assess patient's nutrition/hydration status for malnutrition  Collaborate with interdisciplinary team and initiate plan and interventions as ordered  Monitor patient's weight and dietary intake as ordered or per policy  Utilize nutrition screening tool and intervene as necessary  Determine patient's food preferences and provide high-protein, high-caloric foods as appropriate       INTERVENTIONS:  - Monitor oral intake, urinary output, labs, and treatment plans  - Assess nutrition and hydration status and recommend course of action  - Evaluate amount of meals eaten  - Assist patient with eating if necessary   - Allow adequate time for meals  - Recommend/ encourage appropriate diets, oral nutritional supplements, and vitamin/mineral supplements  - Order, calculate, and assess calorie counts as needed  - Recommend, monitor, and adjust tube feedings and TPN/PPN based on assessed needs  - Assess need for intravenous fluids  - Provide specific nutrition/hydration education as appropriate  - Include patient/family/caregiver in decisions related to nutrition  Outcome: Progressing

## 2022-09-09 NOTE — PROGRESS NOTES
[facility]  Progress Note - Lisseth Medel 1940, 80 y o  female MRN: [id number]  Unit/Bed#: -01 Encounter: [id number]  Primary Care Provider: Shanika Alvarez DO   Date and time admitted to hospital: 9/7/2022  7:16 PM    Morbid obesity ([address] )  Assessment & Plan  · Counseling about weight loss, diet and exercise  Generalized weakness  Assessment & Plan  · Patient reporting generalized weakness and fatigue  · Secondary to multifocal pneumonia, see treatment above  · Fall precautions, PT/OT evaluation- input appreciated    Chronic constipation  Assessment & Plan  · Continue bowel regimen, Carafate, simethicone    Essential hypertension  Assessment & Plan  · BP well controlled  · Continue lisinopril and metoprolol  · Monitor vitals per routine  Blood pressure 123/60, pulse 73, temperature (!) 97 4 °F (36 3 °C), resp  rate 16, height 5' 1" (1 549 m), weight 102 kg (225 lb), SpO2 93 %  Type 2 diabetes mellitus without complication, with long-term current use of insulin [facility])  Assessment & Plan  Lab Results   Component Value Date    HGBA1C 8 0 (H) 08/15/2022       Recent Labs     09/08/22  1618 09/08/22  2115 09/09/22  0816 09/09/22  1032   POCGLU 121 153* 192* 135       Blood Sugar Average: Last 72 hrs:  (P) 151 [id number]   · Holding home oral medications  · Due to lack of appetite, will decrease Lantus to 25 U HS (home dose Tresiba 50U HS)  · Start on SSI with Accu-Cheks  · Hypoglycemia protocol  · Diabetic diet    Coronary artery disease involving coronary bypass graft of native heart without angina pectoris  Assessment & Plan  · Denies any chest pain  · Continue aspirin, beta-blocker and statin therapy  * Pneumonia  Assessment & Plan  Patient presenting to the ER for increasing shortness of breath  Patient reports her symptoms started about a week ago    Patient had a tele visit with her family medicine doctor today, who recommended that she reports the ED for evaluation  She denies any associated fever/chills, chest pain, abdominal pain, nausea/vomiting/diarrhea, urinary complaints at this time  · Currently oxygenating well on RA, O2 96%  · COVID negative  · CT C/A/P:  Multifocal pneumonia  · Due to medication allergy to penicillins and cephalosporins, will start patient on IV levofloxacin  · Does not meet sepsis criteria at this time  · Obtain sputum culture, urine strep and Legionella  · Continuous pulse ox, Follow-up infectious labs  TE Pharmacologic Prophylaxis: Enoxaparin (Lovenox)    Patient Centered Rounds: I have performed bedside rounds with nursing staff today  Discussions with Specialists or Other Care Team Provider:  Care team  Education and Discussions with Family / Patient:  Patient    Current Length of Stay: 2 day(s)  Current Patient Status: Inpatient     Certification Statement: The patient will continue to require additional inpatient hospital stay due to Pneumonia  Discharge Plan / Estimated Discharge Date:  1-2 days    Code Status: Level 1 - Full Code  ______________________________________________________________________________    Subjective:   Patient seen and examined by me  No chest pain, palpitations or diaphoresis at this point of time  Patient does have weakness of the weakness is generalized  No fever or chills  No nausea or vomiting  Patient does have weakness and some dizziness when she moves around but otherwise states she is a little improved since yesterday    Objective:   Vitals: Blood pressure 123/60, pulse 73, temperature (!) 97 4 °F (36 3 °C), resp  rate 16, height 5' 1" (1 549 m), weight 102 kg (225 lb), SpO2 93 %      Physical Exam:   General appearance: alert, appears stated age and cooperative  Constitutional- looks a little weak  HEENT - atraumatic and normocephalic  Neck- supple  Skin - no fresh rash  Extremities no fresh focal deformities  Cardiovascular- S1-S2 heard  Respiratory- bilateral air entry present, no crackles or rhonchi  Skin - no fresh rash  Abdomen - normal bowel sounds present, no rebound tenderness  CNS- No fresh focal deficits  Psych- no acute psychosis     Additional Data:   Labs:  Results from last 7 days   Lab Units 09/09/22 0448 09/08/22  0503 09/07/22  1953   WBC Thousand/uL 7 99 8 34 9 60   HEMOGLOBIN g/dL 13 1 12 4 13 3   HEMATOCRIT % 38 6 38 2 40 8   MCV fL 87 89 90   TOTAL NEUT ABS Thousand/uL  --   --  5 38   BANDS PCT %  --   --  2   PLATELETS Thousands/uL 391* 365 399*     Results from last 7 days   Lab Units 09/09/22 0448 09/08/22  0503 09/07/22 1953   SODIUM mmol/L 136 139 137   POTASSIUM mmol/L 4 3 3 9 3 7   CHLORIDE mmol/L 100 103 100   CO2 mmol/L 24 26 28   ANION GAP mmol/L 12 10 9   BUN mg/dL 14 10 11   CREATININE mg/dL 0 72 0 63 0 71   CALCIUM mg/dL 9 5 9 2 9 7   ALBUMIN g/dL  --  2 7* 3 1*   TOTAL BILIRUBIN mg/dL  --  0 23 0 19*   ALK PHOS U/L  --  87 109   ALT U/L  --  24 27   AST U/L  --  13 14   EGFR ml/min/1 73sq m 79 85 81   GLUCOSE RANDOM mg/dL 139 135 140     Results from last 7 days   Lab Units 09/07/22 1953   MAGNESIUM mg/dL 1 8         Results from last 7 days   Lab Units 09/07/22 1953   NT-PRO BNP pg/mL 261      Results from last 7 days   Lab Units 09/08/22  0503 09/07/22  2355 09/07/22  2210   LACTIC ACID mmol/L  --   --  0 9   PROCALCITONIN ng/ml 0 17   < >  --     < > = values in this interval not displayed  Results from last 7 days   Lab Units 09/09/22  1032 09/09/22  0816 09/08/22  2115 09/08/22  1618 09/08/22  1102 09/08/22  0711 09/07/22  2306   POC GLUCOSE mg/dl 135 192* 153* 121 201* 141* 119             * I Have Reviewed All Lab Data Listed Above  Cultures:   Results from last 7 days   Lab Units 09/08/22  0129 09/07/22  2210 09/07/22  2131   BLOOD CULTURE   --  No Growth at 24 hrs    No Growth at 24 hrs   --    INFLUENZA A PCR   --   --  Negative   LEGIONELLA URINARY ANTIGEN  Negative  --   --    STREP PNEUMONIAE ANTIGEN, URINE  Negative  --   -- Results from last 7 days   Lab Units 09/07/22  2131   INFLUENZA A PCR  Negative   INFLUENZA B PCR  Negative   RSV PCR  Negative         Imaging:  Imaging Reports Reviewed Today Include:   CT chest abdomen pelvis wo contrast    Result Date: 9/7/2022  Impression: 1  Multifocal pneumonia  2   No evidence of acute intra-abdominal or pelvic process  Workstation performed: [id number]     XR chest 2 views    Result Date: 9/8/2022  Impression: Infiltrate in the posterior aspect of the right upper lobe   Workstation performed: [id number]     Scheduled Meds:  Current Facility-Administered Medications   Medication Dose Route Frequency Provider Last Rate    acetaminophen  650 mg Oral Q6H PRN Annnorberto Singh PA-C      ascorbic acid  500 mg Oral BID Annieconstance Singh PA-C      aspirin  81 mg Oral Daily Emilia Sutherland PA-C      benzonatate  200 mg Oral TID PRN Annnorberto StarAZUCENA rush      cilostazol  100 mg Oral BID Annnorberto Singh PA-C      enoxaparin  40 mg Subcutaneous Q12H [facility] Emilia Sutherland PA-C      furosemide  20 mg Oral BID PRN Kianna Singh PA-C      guaiFENesin  600 mg Oral BID Kianna Singh PA-C      insulin glargine  25 Units Subcutaneous HS Emilia Sutherland PA-C      insulin lispro  1-5 Units Subcutaneous HS Emilia Sutherland PA-C      insulin lispro  1-6 Units Subcutaneous TID AC Emilia Sutherland PA-C      levofloxacin  750 mg Intravenous Q24H Emilia Sutherland PA-C 750 mg (09/08/22 2208)    lisinopril  10 mg Oral Daily Emilia Sutherland PA-C      magnesium hydroxide  15 mL Oral Daily PRN Kianna Singh PA-C      melatonin  3 mg Oral HS Emilia Sutherland PA-C      metoprolol tartrate  25 mg Oral Daily Emilia Sutherland PA-C      polyethylene glycol  17 g Oral BID before lunch/dinner Cristin Ladd MD      pravastatin  80 mg Oral Daily With [employer] IncAZUCENA      simethicone  80 mg Oral Q6H PRN Kianna Singh PA-C      sodium chloride  2 spray Each Nare 4x Daily PRN Christy Garcia PA-C      sucralfate  1 g Oral 4x Daily (AC & HS) AZUCENA Dawson MD  [place] [address] Internal Medicine    ** Please Note: This note has been constructed using a voice recognition system   **

## 2022-09-09 NOTE — ASSESSMENT & PLAN NOTE
· BP well controlled  · Continue lisinopril and metoprolol  · Monitor vitals per routine  Blood pressure 123/60, pulse 73, temperature (!) 97 4 °F (36 3 °C), resp  rate 16, height 5' 1" (1 549 m), weight 102 kg (225 lb), SpO2 93 %

## 2022-09-09 NOTE — ASSESSMENT & PLAN NOTE
Lab Results   Component Value Date    HGBA1C 8 0 (H) 08/15/2022       Recent Labs     09/08/22  1618 09/08/22  2115 09/09/22  0816 09/09/22  1032   POCGLU 121 153* 192* 135       Blood Sugar Average: Last 72 hrs:  (P) 151 [id number]   · Holding home oral medications  · Due to lack of appetite, will decrease Lantus to 25 U HS (home dose Tresiba 50U HS)  · Start on SSI with Accu-Cheks  · Hypoglycemia protocol  · Diabetic diet

## 2022-09-09 NOTE — PROGRESS NOTES
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

## 2022-09-09 NOTE — ASSESSMENT & PLAN NOTE
· Patient reporting generalized weakness and fatigue  · Secondary to multifocal pneumonia, see treatment above  · Fall precautions, PT/OT evaluation- input appreciated

## 2022-09-10 VITALS
OXYGEN SATURATION: 97 % | HEART RATE: 85 BPM | RESPIRATION RATE: 18 BRPM | DIASTOLIC BLOOD PRESSURE: 57 MMHG | TEMPERATURE: 98 F | WEIGHT: 225 LBS | HEIGHT: 61 IN | SYSTOLIC BLOOD PRESSURE: 135 MMHG | BODY MASS INDEX: 42.48 KG/M2

## 2022-09-10 LAB
ANION GAP SERPL CALCULATED.3IONS-SCNC: 10 MMOL/L (ref 4–13)
BUN SERPL-MCNC: 15 MG/DL (ref 5–25)
CALCIUM SERPL-MCNC: 9.2 MG/DL (ref 8.3–10.1)
CHLORIDE SERPL-SCNC: 102 MMOL/L (ref 96–108)
CO2 SERPL-SCNC: 25 MMOL/L (ref 21–32)
CREAT SERPL-MCNC: 0.7 MG/DL (ref 0.6–1.3)
ERYTHROCYTE [DISTWIDTH] IN BLOOD BY AUTOMATED COUNT: 13.4 % (ref 11.6–15.1)
GFR SERPL CREATININE-BSD FRML MDRD: 82 ML/MIN/1.73SQ M
GLUCOSE SERPL-MCNC: 129 MG/DL (ref 65–140)
GLUCOSE SERPL-MCNC: 140 MG/DL (ref 65–140)
GLUCOSE SERPL-MCNC: 156 MG/DL (ref 65–140)
HCT VFR BLD AUTO: 39.9 % (ref 34.8–46.1)
HGB BLD-MCNC: 13.1 G/DL (ref 11.5–15.4)
MCH RBC QN AUTO: 29 PG (ref 26.8–34.3)
MCHC RBC AUTO-ENTMCNC: 32.8 G/DL (ref 31.4–37.4)
MCV RBC AUTO: 89 FL (ref 82–98)
PLATELET # BLD AUTO: 366 THOUSANDS/UL (ref 149–390)
PMV BLD AUTO: 9.6 FL (ref 8.9–12.7)
POTASSIUM SERPL-SCNC: 4.4 MMOL/L (ref 3.5–5.3)
RBC # BLD AUTO: 4.51 MILLION/UL (ref 3.81–5.12)
SODIUM SERPL-SCNC: 137 MMOL/L (ref 135–147)
WBC # BLD AUTO: 7.63 THOUSAND/UL (ref 4.31–10.16)

## 2022-09-10 PROCEDURE — 82948 REAGENT STRIP/BLOOD GLUCOSE: CPT

## 2022-09-10 PROCEDURE — 80048 BASIC METABOLIC PNL TOTAL CA: CPT | Performed by: INTERNAL MEDICINE

## 2022-09-10 PROCEDURE — 85027 COMPLETE CBC AUTOMATED: CPT | Performed by: INTERNAL MEDICINE

## 2022-09-10 PROCEDURE — 99239 HOSP IP/OBS DSCHRG MGMT >30: CPT | Performed by: INTERNAL MEDICINE

## 2022-09-10 RX ORDER — LEVOFLOXACIN 500 MG/1
500 TABLET, FILM COATED ORAL EVERY 24 HOURS
Qty: 3 TABLET | Refills: 0 | Status: SHIPPED | OUTPATIENT
Start: 2022-09-10 | End: 2022-09-13

## 2022-09-10 RX ORDER — GUAIFENESIN 600 MG/1
600 TABLET, EXTENDED RELEASE ORAL 2 TIMES DAILY
Qty: 14 TABLET | Refills: 0 | Status: SHIPPED | OUTPATIENT
Start: 2022-09-10 | End: 2022-09-17

## 2022-09-10 RX ADMIN — OXYCODONE HYDROCHLORIDE AND ACETAMINOPHEN 500 MG: 500 TABLET ORAL at 12:23

## 2022-09-10 RX ADMIN — SIMETHICONE 80 MG: 80 TABLET, CHEWABLE ORAL at 13:19

## 2022-09-10 RX ADMIN — GUAIFENESIN 600 MG: 600 TABLET, EXTENDED RELEASE ORAL at 12:23

## 2022-09-10 RX ADMIN — POLYETHYLENE GLYCOL 3350 17 G: 17 POWDER, FOR SOLUTION ORAL at 12:18

## 2022-09-10 RX ADMIN — ASPIRIN 81 MG: 81 TABLET, CHEWABLE ORAL at 12:23

## 2022-09-10 RX ADMIN — SUCRALFATE 1 G: 1 TABLET ORAL at 12:18

## 2022-09-10 RX ADMIN — LISINOPRIL 10 MG: 10 TABLET ORAL at 12:23

## 2022-09-10 RX ADMIN — SUCRALFATE 1 G: 1 TABLET ORAL at 05:34

## 2022-09-10 RX ADMIN — CILOSTAZOL 100 MG: 100 TABLET ORAL at 12:23

## 2022-09-10 RX ADMIN — ACETAMINOPHEN 650 MG: 325 TABLET ORAL at 02:30

## 2022-09-10 RX ADMIN — METOPROLOL TARTRATE 25 MG: 25 TABLET, FILM COATED ORAL at 12:23

## 2022-09-10 NOTE — CASE MANAGEMENT
Case Management Discharge Planning Note    Patient name Emely Oconnell  Location /-87 MRN [id number]  : 1940 Date 9/10/2022       Current Admission Date: 2022  Current Admission Diagnosis:Pneumonia   Patient Active Problem List    Diagnosis Date Noted    Morbid obesity ([facility] ) 2022    Pneumonia 2022    Generalized weakness 2022    Post-nasal drip 04/10/2022    PVD (peripheral vascular disease) ([place] [address] ) 2021    Body mass index (BMI) of 40 0 to 44 9 in [facility]) 2021    Coronary artery disease involving coronary bypass graft of native heart without angina pectoris 2021    Type 2 diabetes mellitus without complication, with long-term current use of insulin ([place] [address] ) 2021    Essential hypertension 2021    Chronic constipation 2021      LOS (days): 3  Geometric Mean LOS (GMLOS) (days): 3 10  Days to GMLOS:0 4     OBJECTIVE:  Risk of Unplanned Readmission Score: 11 35         Current admission status: Inpatient   Preferred Pharmacy:   [address]  Phone: [phone] Fax: [phone]    Primary Care Provider: Jamin Sauceda DO    Primary Insurance: MEDICARE  Secondary Insurance: [id number]         Is the patient interested in [facility] at discharge?: Yes (revolutionary states they will provide care for pt  reserved in aidin )         Other Referral/Resources/Interventions Provided:  Interventions: [facility]  Referral Comments: see notes above           Treatment Team Recommendation: Home, Home with  [place]  Discharge Destination Plan[de-identified] Home with  [place], Home  Transport at Discharge : Family

## 2022-09-11 LAB
BACTERIA BLD CULT: ABNORMAL
GRAM STN SPEC: ABNORMAL
S EPIDERMIDIS DNA BLD POS QL NAA+NON-PRB: DETECTED

## 2022-09-11 NOTE — DISCHARGE SUMMARY
[facility]  Discharge Note - Liz Melissa 1940, 80 y o  female MRN: [id number]  Unit/Bed#: -01 Encounter: [id number]  Primary Care Provider: Marty Cruz DO   Date and time admitted to hospital: 9/7/2022  7:16 PM    Admitting Provider:  Saad El MD  Discharge Provider:  Yg Rodriguez MD  Admission Date: 9/7/2022       Discharge Date:  9/10/ 2022  LOS: 3  Primary Care Physician at Discharge: Romy Torres, [facility] COURSE:  Liz Melissa is a 80 y o  female who presented generalized weakness likely secondary to pneumonia  Patient did have significant shortness of breath  Patient was oxygenating well but was found to have multifocal pneumonia  Patient did have allergies to penicillin and cephalosporin and therefore was treated with intravenous Levaquin during the hospital stay    Patient improved and is being discharged on oral antibiotics    REASON FOR ADMISSION/ ADMISSION DIAGNOSES    Shortness of breath secondary to multifocal pneumonia    DISCHARGE DIAGNOSES      Morbid obesity ([address] )  Assessment & Plan  · Counseling about weight loss, diet and exercise      Generalized weakness  Assessment & Plan  · Patient reporting generalized weakness and fatigue  · Secondary to multifocal pneumonia, improved at discharge     Chronic constipation  Assessment & Plan  · Continue bowel regimen, Carafate, simethicone     Essential hypertension  Assessment & Plan  · BP well controlled  · Continue lisinopril and metoprolol      Type 2 diabetes mellitus without complication, with long-term current use of insulin [facility])  Assessment & Plan        Lab Results   Component Value Date     HGBA1C 8 0 (H) 08/15/2022                Recent Labs     09/08/22  1618 09/08/22  2115 09/09/22  0816 09/09/22  1032   POCGLU 121 153* 192* 135         Blood Sugar Average: Last 72 hrs:  (P) 151 [id number]   · Discharged on home regimen     Coronary artery disease involving coronary bypass graft of native heart without angina pectoris  Assessment & Plan  · Denies any chest pain  · Continue aspirin, beta-blocker and statin therapy      * Pneumonia  Assessment & Plan  Patient presenting to the ER for increasing shortness of breath  Patient reports her symptoms started about a week ago  Patient had a tele visit with her family medicine doctor today, who recommended that she reports the ED for evaluation  She denies any associated fever/chills, chest pain, abdominal pain, nausea/vomiting/diarrhea, urinary complaints at this time  · Currently oxygenating well on RA, O2 96%  · COVID negative  · CT C/A/P:  Multifocal pneumonia  · Improved with intravenous levofloxacin and is being Discharged on oral antibiotics    CONSULTING PROVIDERS   None    PROCEDURES PERFORMED  * No surgery found *    RADIOLOGY RESULTS  CT chest abdomen pelvis wo contrast    Result Date: 9/7/2022  Impression: 1  Multifocal pneumonia  2   No evidence of acute intra-abdominal or pelvic process  Workstation performed: [id number]     XR chest 2 views    Result Date: 9/8/2022  Impression: Infiltrate in the posterior aspect of the right upper lobe   Workstation performed: [id number]       LABS  Results from last 7 days   Lab Units 09/10/22  0533 09/09/22  0448 09/08/22  0503 09/07/22  1953   WBC Thousand/uL 7 63 7 99 8 34 9 60   HEMOGLOBIN g/dL 13 1 13 1 12 4 13 3   HEMATOCRIT % 39 9 38 6 38 2 40 8   MCV fL 89 87 89 90   TOTAL NEUT ABS Thousand/uL  --   --   --  5 38   BANDS PCT %  --   --   --  2   PLATELETS Thousands/uL 366 391* 365 399*     Results from last 7 days   Lab Units 09/10/22  0533 09/09/22  0448 09/08/22  0503 09/07/22  1953   SODIUM mmol/L 137 136 139 137   POTASSIUM mmol/L 4 4 4 3 3 9 3 7   CHLORIDE mmol/L 102 100 103 100   CO2 mmol/L 25 24 26 28   BUN mg/dL 15 14 10 11   CREATININE mg/dL 0 70 0 72 0 63 0 71   CALCIUM mg/dL 9 2 9 5 9 2 9 7   ALBUMIN g/dL  --   --  2 7* 3 1*   TOTAL BILIRUBIN mg/dL  --   --  0 23 0 19*   ALK PHOS U/L  --   --  87 109   ALT U/L  --   --  24 27   AST U/L  --   --  13 14   EGFR ml/min/1 73sq m 82 79 85 81   GLUCOSE RANDOM mg/dL 140 139 135 140         Results from last 7 days   Lab Units 09/07/22  1953   NT-PRO BNP pg/mL 261          Results from last 7 days   Lab Units 09/10/22  1057 09/10/22  0609 09/09/22  2052 09/09/22  1625 09/09/22  1032 09/09/22  0816 09/08/22  2115 09/08/22  1618 09/08/22  1102 09/08/22  0711   POC GLUCOSE mg/dl 129 156* 169* 142* 135 192* 153* 121 201* 141*             Results from last 7 days   Lab Units 09/08/22  0503 09/07/22  2355 09/07/22  2210   LACTIC ACID mmol/L  --   --  0 9   PROCALCITONIN ng/ml 0 17   < >  --     < > = values in this interval not displayed             Cultures:         Invalid input(s): Cesilia Rosa        Results from last 7 days   Lab Units 09/08/22  0129 09/07/22  2210 09/07/22  2131   BLOOD CULTURE   --  Staphylococcus coagulase negative*  No Growth at 72 hrs   --    GRAM STAIN RESULT   --  Gram positive cocci in clusters*  --    INFLUENZA A PCR   --   --  Negative   LEGIONELLA URINARY ANTIGEN  Negative  --   --    STREP PNEUMONIAE ANTIGEN, URINE  Negative  --   --        PHYSICAL EXAM:  Vitals:   Blood Pressure: 135/57 (09/10/22 1223)  Pulse: 85 (09/10/22 1223)  Temperature: 98 °F (36 7 °C) (09/10/22 0806)  Temp Source: Oral (09/09/22 1821)  Respirations: 18 (09/10/22 0806)  Height: 5' 1" (154 9 cm) (09/08/22 1528)  Weight - Scale: 102 kg (225 lb) (09/08/22 1528)  SpO2: 97 % (09/10/22 0806)    General appearance: alert, appears stated age and cooperative  HEENT - atraumatic and normocephalic  Neck- supple  Skin - no fresh rash  Extremities no fresh focal deformities  Cardiovascular- S1-S2 heard  Respiratory- bilateral air entry present, no crackles or rhonchi  Skin - no fresh rash  Abdomen - normal bowel sounds present, no rebound tenderness  CNS- No fresh focal deficits  Psych- no acute psychosis Planned Re-admission:  No  Discharge Disposition: Home with [facility]    Test Results Pending at Discharge:   Pending Labs     Order Current Status    Blood culture #2 Preliminary result      Incidental findings:  None    Medications   · Summary of Medication Adjustments made as a result of this hospitalization:  Antibiotics at discharge, see detailed med list  · Medication Dosing Tapers - Please refer to Discharge Medication List for details on any medication dosing tapers (if applicable to patient)  · Discharge Medication List: See after visit summary for reconciled discharge medications  Diet restrictions:  Regular diet   Activity restrictions: No strenuous activity  Discharge Condition: fair    Outpatient Follow-Up and Discharge Instructions  See after visit summary section titled Discharge Instructions for information provided to patient and family  Code Status: Prior  Discharge Statement   I spent 35 minutes discharging the patient  This time was spent on the day of discharge  Greater than 50% of total time was spent with the patient and / or family counseling and / or coordination of care  Zeinab Gillespie MD  [place] [address] Internal Medicine    ** Please Note: This note has been constructed using a voice recognition system   **

## 2022-09-11 NOTE — RESULT ENCOUNTER NOTE
1 of 2 blood cultures positive for Staph coagulase negative  Hospitalized and treated with Levaquin    One organism isolated from a single set of blood cultures likely represents skin michelle contamination

## 2022-09-12 ENCOUNTER — TRANSITIONAL CARE MANAGEMENT (OUTPATIENT)
Dept: INTERNAL MEDICINE CLINIC | Facility: CLINIC | Age: 82
End: 2022-09-12

## 2022-09-12 ENCOUNTER — TELEPHONE (OUTPATIENT)
Dept: INTERNAL MEDICINE CLINIC | Facility: CLINIC | Age: 82
End: 2022-09-12

## 2022-09-12 LAB
ATRIAL RATE: 87 BPM
P AXIS: 11 DEGREES
PR INTERVAL: 122 MS
QRS AXIS: -68 DEGREES
QRSD INTERVAL: 120 MS
QT INTERVAL: 382 MS
QTC INTERVAL: 459 MS
T WAVE AXIS: 29 DEGREES
VENTRICULAR RATE: 87 BPM

## 2022-09-12 PROCEDURE — 93010 ELECTROCARDIOGRAM REPORT: CPT | Performed by: INTERNAL MEDICINE

## 2022-09-12 NOTE — TELEPHONE ENCOUNTER
Pt has referral for Home Health- she was with [facility]    The nurse would come to her home- would this cover PT and/or occupational? Plus what else would it cover?

## 2022-09-13 ENCOUNTER — RA CDI HCC (OUTPATIENT)
Dept: OTHER | Facility: HOSPITAL | Age: 82
End: 2022-09-13

## 2022-09-13 LAB — BACTERIA BLD CULT: NORMAL

## 2022-09-13 NOTE — PROGRESS NOTES
[facility]  coding opportunities          Chart Reviewed number of suggestions sent to Provider: 1     Patients Insurance     Medicare Insurance: [employer]

## 2022-09-13 NOTE — TELEPHONE ENCOUNTER
Wants a call back    Per hospital patient needs nurse and PT ordered    I told patient she may have to wait until her TCM appt on 9/19 and she was not happy

## 2022-09-19 ENCOUNTER — OFFICE VISIT (OUTPATIENT)
Dept: INTERNAL MEDICINE CLINIC | Facility: CLINIC | Age: 82
End: 2022-09-19
Payer: MEDICARE

## 2022-09-19 VITALS
WEIGHT: 222.2 LBS | HEART RATE: 85 BPM | TEMPERATURE: 97.5 F | SYSTOLIC BLOOD PRESSURE: 118 MMHG | BODY MASS INDEX: 41.95 KG/M2 | RESPIRATION RATE: 18 BRPM | DIASTOLIC BLOOD PRESSURE: 60 MMHG | OXYGEN SATURATION: 96 % | HEIGHT: 61 IN

## 2022-09-19 DIAGNOSIS — E11.51 TYPE 2 DIABETES MELLITUS WITH DIABETIC PERIPHERAL ANGIOPATHY WITHOUT GANGRENE, WITH LONG-TERM CURRENT USE OF INSULIN (HCC): ICD-10-CM

## 2022-09-19 DIAGNOSIS — E11.9 TYPE 2 DIABETES MELLITUS WITHOUT COMPLICATION, WITH LONG-TERM CURRENT USE OF INSULIN (HCC): ICD-10-CM

## 2022-09-19 DIAGNOSIS — J18.9 PNEUMONIA DUE TO INFECTIOUS ORGANISM, UNSPECIFIED LATERALITY, UNSPECIFIED PART OF LUNG: ICD-10-CM

## 2022-09-19 DIAGNOSIS — I73.9 PVD (PERIPHERAL VASCULAR DISEASE) (HCC): ICD-10-CM

## 2022-09-19 DIAGNOSIS — I10 ESSENTIAL HYPERTENSION: ICD-10-CM

## 2022-09-19 DIAGNOSIS — R32 URINARY INCONTINENCE, UNSPECIFIED TYPE: ICD-10-CM

## 2022-09-19 DIAGNOSIS — R53.1 GENERALIZED WEAKNESS: Primary | ICD-10-CM

## 2022-09-19 DIAGNOSIS — Z79.4 TYPE 2 DIABETES MELLITUS WITH DIABETIC PERIPHERAL ANGIOPATHY WITHOUT GANGRENE, WITH LONG-TERM CURRENT USE OF INSULIN (HCC): ICD-10-CM

## 2022-09-19 DIAGNOSIS — Z79.4 TYPE 2 DIABETES MELLITUS WITHOUT COMPLICATION, WITH LONG-TERM CURRENT USE OF INSULIN (HCC): ICD-10-CM

## 2022-09-19 PROCEDURE — 99495 TRANSJ CARE MGMT MOD F2F 14D: CPT | Performed by: FAMILY MEDICINE

## 2022-09-19 NOTE — PROGRESS NOTES
Assessment & Plan     1  Generalized weakness-persistant  PT is recommended  Referral was placed to [facility] but they haven't established yet  Will resend referral      2  Essential hypertension- well controlled on lasix, benazepril and metoprolol  Will continue to monitor  3  Type 2 diabetes mellitus without complication, with long-term current use of insulin ([facility] )  4  Type 2 diabetes mellitus with diabetic peripheral angiopathy without gangrene, with long-term current use of insulin ([facility] )- uncontrolled with a1c of 8  Follows with endocrie  Recommend discussion of reinitiating metformin and a GLP1 as well  5  PVD (peripheral vascular disease) (HCC)-on a statin    6  Urinary incontinence, unspecified type-counseling provided  7  Pneumonia due to infectious organism, unspecified laterality, unspecified part of lung- s/p completion of 7 day course of antiboitc  Will obtain repeat CXR in 6 weeks  BMI Counseling: Body mass index is 41 98 kg/m²  The BMI is above normal  Nutrition recommendations include reducing intake of cholesterol  Exercise recommendations include exercising 3-5 times per week  Rationale for BMI follow-up plan is due to patient being overweight or obese  Depression Screening and Follow-up Plan: Patient was screened for depression during today's encounter  They screened negative with a PHQ-2 score of 1  Urinary Incontinence Plan of Care: counseling topics discussed: use restroom every 2 hours, limiting fluid intake 3-4 hours before bed and preventing constipation  Subjective     Transitional Care Management Review: Dmitri Vogel is a 80 y o  female here for TCM follow up       During the TCM phone call patient stated:  TCM Call     Date and time call was made  9/12/2022 10:43 AM    Hospital care reviewed  Records reviewed    Patient was hospitialized at  [facility]    Date of Admission  09/07/22    Date of discharge  09/10/22    Diagnosis  Multifocal pneumonia,SOB    Disposition  Home; Home health services    Were the patients medications reviewed and updated  Yes    Current Symptoms  Cough; Fatigue; Weakness    Cough Severity  Mild    Weakness severity  Mild    Fatigue severity  Mild      TCM Call     Post hospital issues  Reduced activity    Should patient be enrolled in anticoag monitoring? No    Scheduled for follow up? Yes    Did you obtain your prescribed medications  Yes    Do you need help managing your prescriptions or medications  No    Is transportation to your appointment needed  No    I have advised the patient to call PCP with any new or worsening symptoms  CynthiaGregorLPN    Living Arrangements  [address]    Do you have social support  Yes, as much as I need    Are you recieving any outpatient services  No    Are you recieving home care services  No    Are you using any community resources  No    Current waiver services  No    Have you fallen in the last 12 months  No    Interperter language line needed  No    Counseling  Patient    Counseling topics  patient and family education        Finished antibiotic from pneumonia  Still hasn't had PT at home  Continues to have post nasal drip and congestion  discussed restarting sudafed use  Follows with endocrine for diabetes  Uses insulin and glipizide  Using miralax and milk of magneia for the constipatin  Having a BM daily  Has a colonoscpy scheduled in novemeber     Review of Systems   HENT: Positive for congestion and postnasal drip  Respiratory: Negative for shortness of breath  Cardiovascular: Negative for chest pain  Gastrointestinal: Positive for constipation  Neurological: Positive for weakness         Objective     /60 (BP Location: Left arm, Patient Position: Sitting, Cuff Size: Large)   Pulse 85   Temp 97 5 °F (36 4 °C) (Temporal)   Resp 18   Ht 5' 1" (1 549 m)   Wt 101 kg (222 lb 3 2 oz)   SpO2 96%   BMI 41 98 kg/m²      Physical Exam  HENT:      Head: Normocephalic  Right Ear: External ear normal       Left Ear: External ear normal    Eyes:      Conjunctiva/sclera: Conjunctivae normal       Pupils: Pupils are equal, round, and reactive to light  Cardiovascular:      Rate and Rhythm: Normal rate and regular rhythm  Heart sounds: No murmur heard  Pulmonary:      Effort: Pulmonary effort is normal       Breath sounds: Normal breath sounds  Abdominal:      General: Bowel sounds are normal       Palpations: Abdomen is soft  Musculoskeletal:      Right lower leg: Edema (trace) present  Left lower leg: Edema (trace) present  Neurological:      Mental Status: She is alert and oriented to person, place, and time  Gait: Gait abnormal (rollator walker )         Raymundo Castañeda DO

## 2022-10-09 DIAGNOSIS — R11.0 NAUSEA: ICD-10-CM

## 2022-10-09 DIAGNOSIS — K59.09 CHRONIC CONSTIPATION: ICD-10-CM

## 2022-10-09 RX ORDER — ONDANSETRON 4 MG/1
4 TABLET, ORALLY DISINTEGRATING ORAL EVERY 6 HOURS PRN
Qty: 20 TABLET | Refills: 0 | Status: SHIPPED | OUTPATIENT
Start: 2022-10-09

## 2022-11-09 PROBLEM — J18.9 PNEUMONIA: Status: RESOLVED | Noted: 2022-09-07 | Resolved: 2022-11-09

## 2022-11-10 ENCOUNTER — NURSE TRIAGE (OUTPATIENT)
Dept: OTHER | Facility: OTHER | Age: 82
End: 2022-11-10

## 2022-11-10 NOTE — TELEPHONE ENCOUNTER
Reason for Disposition  • Bowel prep for colonoscopy, questions about    Answer Assessment - Initial Assessment Questions  1  DATE/TIME: "When did you have your colonoscopy?"       Scheduled for 11/14/22    2   MAIN CONCERN: "What is your main concern right now?" "What questions do you have?"     "I need the instructions for the miralax prep"    Protocols used: COLONOSCOPY SYMPTOMS AND QUESTIONS-ADULT-

## 2022-11-10 NOTE — TELEPHONE ENCOUNTER
Regarding: Colonoscopy prep instructions  ----- Message from Ellis Simon sent at 11/10/2022  4:43 PM EST -----  "I am having a Colonoscopy on Monday 11/14/22 and need prep instructions "

## 2022-11-11 ENCOUNTER — TELEPHONE (OUTPATIENT)
Dept: GASTROENTEROLOGY | Facility: CLINIC | Age: 82
End: 2022-11-11

## 2022-11-11 NOTE — TELEPHONE ENCOUNTER
Called patient and rschled   2/13/23 Colonoscopy with Dr Amos Oseguera   she has Prep Instructions
Patients GI provider:  Dr Amos Oseguera    Number to return call: [phone]    Reason for call: Pt calling to reschedule colonoscopy    Scheduled procedure/appointment date if applicable: Procedure 67/17/90
If you are a smoker, it is important for your health to stop smoking. Please be aware that second hand smoke is also harmful.

## 2022-11-18 ENCOUNTER — TELEPHONE (OUTPATIENT)
Dept: INTERNAL MEDICINE CLINIC | Facility: CLINIC | Age: 82
End: 2022-11-18

## 2022-11-18 NOTE — TELEPHONE ENCOUNTER
Message left today:  I'm calling for the patient of Doctor Gaetano Neal  I'm a physical therapist from [facility]  I just want to tell the doctor that I discharged Sohail Gould from our agency today  Thank you so much  Crystal Hernandes

## 2022-12-08 ENCOUNTER — TELEPHONE (OUTPATIENT)
Dept: INTERNAL MEDICINE CLINIC | Facility: CLINIC | Age: 82
End: 2022-12-08

## 2022-12-08 DIAGNOSIS — Z79.4 TYPE 2 DIABETES MELLITUS WITH DIABETIC PERIPHERAL ANGIOPATHY WITHOUT GANGRENE, WITH LONG-TERM CURRENT USE OF INSULIN (HCC): ICD-10-CM

## 2022-12-08 DIAGNOSIS — Z12.31 ENCOUNTER FOR SCREENING MAMMOGRAM FOR MALIGNANT NEOPLASM OF BREAST: Primary | ICD-10-CM

## 2022-12-08 DIAGNOSIS — E11.51 TYPE 2 DIABETES MELLITUS WITH DIABETIC PERIPHERAL ANGIOPATHY WITHOUT GANGRENE, WITH LONG-TERM CURRENT USE OF INSULIN (HCC): ICD-10-CM

## 2022-12-12 ENCOUNTER — APPOINTMENT (OUTPATIENT)
Dept: RADIOLOGY | Facility: CLINIC | Age: 82
End: 2022-12-12

## 2022-12-12 ENCOUNTER — OFFICE VISIT (OUTPATIENT)
Dept: INTERNAL MEDICINE CLINIC | Facility: CLINIC | Age: 82
End: 2022-12-12

## 2022-12-12 VITALS
BODY MASS INDEX: 41.44 KG/M2 | SYSTOLIC BLOOD PRESSURE: 132 MMHG | HEART RATE: 92 BPM | HEIGHT: 61 IN | TEMPERATURE: 97.6 F | OXYGEN SATURATION: 98 % | DIASTOLIC BLOOD PRESSURE: 60 MMHG | RESPIRATION RATE: 16 BRPM | WEIGHT: 219.5 LBS

## 2022-12-12 DIAGNOSIS — J18.9 PNEUMONIA DUE TO INFECTIOUS ORGANISM, UNSPECIFIED LATERALITY, UNSPECIFIED PART OF LUNG: ICD-10-CM

## 2022-12-12 DIAGNOSIS — R09.89 CHEST CONGESTION: ICD-10-CM

## 2022-12-12 DIAGNOSIS — Z00.00 MEDICARE ANNUAL WELLNESS VISIT, SUBSEQUENT: Primary | ICD-10-CM

## 2022-12-12 DIAGNOSIS — Z23 ENCOUNTER FOR IMMUNIZATION: ICD-10-CM

## 2022-12-12 NOTE — PROGRESS NOTES
Assessment and Plan:     Problem List Items Addressed This Visit    None  Visit Diagnoses     Medicare annual wellness visit, subsequent    -  Primary    Chest congestion        Relevant Orders    XR chest pa & lateral    Encounter for immunization        Relevant Orders    Pneumococcal Conjugate Vaccine 20-valent (PCV20) (Completed)    Pneumonia due to infectious organism, unspecified laterality, unspecified part of lung          likley viral uri is resolving  lingering chest congestion that is well managed with dayquil  Pt to continue  S/p pneumonia this past fall  Need repeat imaging  CXR ordered  Afebrile  Will provide prevnar 20  Depression Screening and Follow-up Plan: Patient was screened for depression during today's encounter  They screened negative with a PHQ-2 score of 1  Preventive health issues were discussed with patient, and age appropriate screening tests were ordered as noted in patient's After Visit Summary  Personalized health advice and appropriate referrals for health education or preventive services given if needed, as noted in patient's After Visit Summary  History of Present Illness:     Patient presents for a Medicare Wellness Visit    Cough and congestion since the fourth  Started with sore throat but that has since subsdied  Using dayquil and night quil with moderate relief  Productive cough  White/clear mucus  denies fever or hemoptysis  Took covid home test this am and it was negative  Patient Care Team:  Nancy Angeles DO as PCP - General (Family Medicine)     Review of Systems:     Review of Systems   Constitutional: Negative for fever  HENT: Positive for congestion and postnasal drip  Respiratory: Positive for cough  Negative for shortness of breath  Cardiovascular: Negative for chest pain  Musculoskeletal: Positive for gait problem (rollator )          Problem List:     Patient Active Problem List   Diagnosis   • PVD (peripheral vascular disease) ([facility] )   • Body mass index (BMI) of 40 0 to 44 9 in adult [facility])   • Coronary artery disease involving coronary bypass graft of native heart without angina pectoris   • Type 2 diabetes mellitus without complication, with long-term current use of insulin (HCC)   • Essential hypertension   • Chronic constipation   • Post-nasal drip   • Generalized weakness   • Morbid obesity (HCC)   • Type 2 diabetes mellitus with diabetic peripheral angiopathy without gangrene, with long-term current use of insulin ([facility])      Past Medical and Surgical History:     Past Medical History:   Diagnosis Date   • Diabetes mellitus ([facility] )    • Hyperlipidemia    • Hypertension      Past Surgical History:   Procedure Laterality Date   • CARDIAC SURGERY        Family History:     Family History   Problem Relation Age of Onset   • Heart disease Mother    • Diabetes Mother    • Hypertension Mother    • Heart disease Father    • Hypertension Father    • Diabetes Father       Social History:     Social History     Socioeconomic History   • Marital status: Single     Spouse name: None   • Number of children: None   • Years of education: None   • Highest education level: None   Occupational History   • None   Tobacco Use   • Smoking status: Never   • Smokeless tobacco: Never   Vaping Use   • Vaping Use: Never used   Substance and Sexual Activity   • Alcohol use: Yes     Alcohol/week: 1 0 standard drink     Types: 1 Glasses of wine per week     Comment: rarely    • Drug use: Never   • Sexual activity: None   Other Topics Concern   • None   Social History Narrative   • None     Social Determinants of Health     Financial Resource Strain: Low Risk    • Difficulty of Paying Living Expenses: Not hard at all   Food Insecurity: No Food Insecurity   • Worried About Running Out of Food in the Last Year: Never true   • Ran Out of Food in the Last Year: Never true   Transportation Needs: No Transportation Needs   • Lack of Transportation (Medical):  No   • Lack of Transportation (Non-Medical):  No   Physical Activity: Not on file   Stress: Not on file   Social Connections: Not on file   Intimate Partner Violence: Not on file   Housing Stability: Low Risk    • Unable to Pay for Housing in the Last Year: No   • Number of Places Lived in the Last Year: 1   • Unstable Housing in the Last Year: No      Medications and Allergies:     Current Outpatient Medications   Medication Sig Dispense Refill   • ascorbic Acid (VITAMIN C) 500 MG CPCR capsule, extended release: 500 mg 1 capsule twice a day by mouth     • Aspirin Buf,CaCarb-MgCarb-MgO, 81 MG TABS Take 81 mg by mouth     • B Complex-Biotin-FA (HM Vitamin B100 Complex) TABS Take by mouth     • benazepril (LOTENSIN) 10 mg tablet TAKE 1 TABLET BY MOUTH EVERY DAY 90 tablet 1   • Blood Glucose Monitoring Suppl (Accu-Chek Guide) w/Device KIT Use     • cilostazol (PLETAL) 100 mg tablet Take 100 mg by mouth 2 (two) times a day     • fluticasone (FLONASE) 50 mcg/act nasal spray 1 SPRAY INTO EACH NOSTRIL DAILY AT BEDTIME 16 mL 1   • furosemide (LASIX) 20 mg tablet TAKE 1 TABLET BY MOUTH TWICE A  tablet 0   • glipiZIDE (GLUCOTROL XL) 5 mg 24 hr tablet      • glucose blood (Accu-Chek Guide) test strip TEST 4 TIMES DAILY DX E11 65     • glucose blood test strip Use 1 each daily as needed Use as instructed     • insulin degludec Radha Irons FlexTouch) 100 units/mL injection pen Inject 5 Units under the skin     • magnesium hydroxide (MILK OF MAGNESIA) 400 mg/5 mL oral suspension Take by mouth daily as needed     • metoprolol tartrate (LOPRESSOR) 25 mg tablet TAKE 1 TABLET BY MOUTH EVERY DAY 90 tablet 5   • ondansetron (ZOFRAN-ODT) 4 mg disintegrating tablet TAKE 1 TABLET (4 MG TOTAL) BY MOUTH EVERY 6 (SIX) HOURS AS NEEDED FOR NAUSEA OR VOMITING 20 tablet 0   • simethicone (MYLICON) 80 mg chewable tablet Chew 80 mg     • simvastatin (ZOCOR) 40 mg tablet TAKE 1 TABLET BY MOUTH EVERY DAY IN THE EVENING 90 tablet 2   • sucralfate (CARAFATE) 1 g tablet TAKE 1 TABLET BY ORAL ROUTE 4 TIMES EVERY DAY ON AN EMPTY STOMACH 1 HOUR BEFORE MEALS AND AT BEDTIME 360 tablet 5   • Accu-Chek FastClix Lancets MISC Use     • montelukast (SINGULAIR) 10 mg tablet Take 1 tablet (10 mg total) by mouth daily at bedtime 90 tablet 1   • polyethylene glycol (GLYCOLAX) 17 GM/SCOOP powder Take 17 g by mouth daily (Patient not taking: Reported on 12/12/2022)     • Probiotic Product (Align) 4 MG CAPS Take 1 capsule by mouth (Patient not taking: Reported on 12/12/2022)       No current facility-administered medications for this visit  Allergies   Allergen Reactions   • Keflex [Cephalexin] Hives   • Penicillins Rash   • Atorvastatin Other (See Comments)     Other reaction(s): Muscle pain  Other reaction(s): Muscle pain     • Metformin Diarrhea     Severe diarrhea  Severe diarrhea        Immunizations:     Immunization History   Administered Date(s) Administered   • COVID-19 MODERNA VACC 0 5 ML IM 01/12/2021, 02/10/2021, 10/28/2021, 04/12/2022, 11/18/2022   • INFLUENZA 11/07/2016, 09/24/2021, 10/25/2022   • Influenza Split High Dose Preservative Free IM 09/15/2020   • Influenza Whole 11/01/2019, 09/15/2020   • Influenza, high dose seasonal 0 7 mL 09/24/2021   • Influenza, seasonal, injectable 11/07/2016   • Pneumococcal Conjugate Vaccine 20-valent (Pcv20), Polysace 12/12/2022      Health Maintenance:         Topic Date Due   • Hepatitis C Screening  Completed         Topic Date Due   • Hepatitis B Vaccine (1 of 3 - 3-dose series) Never done      Medicare Screening Tests and Risk Assessments: Jc Donald is here for her Subsequent Wellness visit  Health Risk Assessment:   Patient rates overall health as fair  Patient feels that their physical health rating is same  Patient is satisfied with their life  Eyesight was rated as same  Hearing was rated as same  Patient feels that their emotional and mental health rating is slightly worse   Patients states they are never, rarely angry  Patient states they are always unusually tired/fatigued  Pain experienced in the last 7 days has been some  Patient's pain rating has been 3/10  Patient states that she has experienced no weight loss or gain in last 6 months  Depression Screening:   PHQ-2 Score: 1      Fall Risk Screening: In the past year, patient has experienced: no history of falling in past year      Urinary Incontinence Screening:   Patient has leaked urine accidently in the last six months  Home Safety:  Patient has trouble with stairs inside or outside of their home  Patient has working smoke alarms and has no working carbon monoxide detector  Home safety hazards include: none  Nutrition:   Current diet is Regular  Medications:   Patient is currently taking over-the-counter supplements  OTC medications include: see medication list  Patient is able to manage medications  Activities of Daily Living (ADLs)/Instrumental Activities of Daily Living (IADLs):   Walk and transfer into and out of bed and chair?: Yes  Dress and groom yourself?: Yes    Bathe or shower yourself?: Yes    Feed yourself?  Yes  Do your laundry/housekeeping?: Yes  Manage your money, pay your bills and track your expenses?: Yes  Make your own meals?: Yes    Do your own shopping?: Yes    Previous Hospitalizations:   Any hospitalizations or ED visits within the last 12 months?: Yes    How many hospitalizations have you had in the last year?: 1-2    Advance Care Planning:   Living will: Yes    Advanced directive: Yes      PREVENTIVE SCREENINGS      Cardiovascular Screening:    General: Screening Current      Diabetes Screening:     General: Screening Not Indicated and History Diabetes      Breast Cancer Screening:     General: Screening Current      Cervical Cancer Screening:    General: Screening Not Indicated      Lung Cancer Screening:     General: Screening Not Indicated      Hepatitis C Screening:    General: Screening Current    Screening, Brief Intervention, and Referral to Treatment (SBIRT)    Screening      Single Item Drug Screening:  How often have you used an illegal drug (including marijuana) or a prescription medication for non-medical reasons in the past year? never    Single Item Drug Screen Score: 0  Interpretation: Negative screen for possible drug use disorder    No results found  Physical Exam:     /60 (BP Location: Left arm, Patient Position: Sitting, Cuff Size: Large)   Pulse 92   Temp 97 6 °F (36 4 °C) (Tympanic)   Resp 16   Ht 5' 1" (1 549 m)   Wt 99 6 kg (219 lb 8 oz)   SpO2 98%   BMI 41 47 kg/m²     Physical Exam  Constitutional:       Appearance: She is not ill-appearing  HENT:      Head: Normocephalic and atraumatic  Right Ear: Ear canal normal       Left Ear: Ear canal normal       Nose: No congestion  Mouth/Throat:      Mouth: Mucous membranes are dry  Pharynx: Oropharynx is clear  No posterior oropharyngeal erythema  Tonsils: No tonsillar exudate  Eyes:      Conjunctiva/sclera: Conjunctivae normal    Cardiovascular:      Rate and Rhythm: Normal rate and regular rhythm  Pulses: no weak pulses          Dorsalis pedis pulses are 1+ on the right side and 1+ on the left side  Heart sounds: No murmur heard  Pulmonary:      Effort: Pulmonary effort is normal       Breath sounds: Normal breath sounds  Feet:      Right foot:      Skin integrity: Callus and dry skin present  Left foot:      Skin integrity: Callus and dry skin present  Skin:     General: Skin is warm  Neurological:      Mental Status: She is alert and oriented to person, place, and time  Psychiatric:         Mood and Affect: Mood normal          Behavior: Behavior normal       Patient's shoes and socks removed  Right Foot/Ankle   Right Foot Inspection  Skin Exam: skin intact, dry skin, callus and callus  Vascular  Capillary refills: < 3 seconds  The right DP pulse is 1+  Left Foot/Ankle  Left Foot Inspection  Skin Exam: skin intact, dry skin and callus  Vascular  Capillary refills: < 3 seconds  The left DP pulse is 1+       Assign Risk Category  No deformity present  No loss of protective sensation  No weak pulses  Risk: [address], [place]

## 2022-12-12 NOTE — PATIENT INSTRUCTIONS
Medicare Preventive Visit Patient Instructions  Thank you for completing your Welcome to Medicare Visit or Medicare Annual Wellness Visit today  Your next wellness visit will be due in one year (12/13/2023)  The screening/preventive services that you may require over the next 5-10 years are detailed below  Some tests may not apply to you based off risk factors and/or age  Screening tests ordered at today's visit but not completed yet may show as past due  Also, please note that scanned in results may not display below  Preventive Screenings:  Service Recommendations Previous Testing/Comments   Colorectal Cancer Screening  * Colonoscopy    * Fecal Occult Blood Test (FOBT)/Fecal Immunochemical Test (FIT)  * Fecal DNA/Cologuard Test  * Flexible Sigmoidoscopy Age: 39-70 years old   Colonoscopy: every 10 years (may be performed more frequently if at higher risk)  OR  FOBT/FIT: every 1 year  OR  Cologuard: every 3 years  OR  Sigmoidoscopy: every 5 years  Screening may be recommended earlier than age 39 if at higher risk for colorectal cancer  Also, an individualized decision between you and your healthcare provider will decide whether screening between the ages of 74-80 would be appropriate  Colonoscopy: Not on file  FOBT/FIT: Not on file  Cologuard: Not on file  Sigmoidoscopy: Not on file          Breast Cancer Screening Age: 36 years old  Frequency: every 1-2 years  Not required if history of left and right mastectomy Mammogram: 07/09/2021    Screening Current   Cervical Cancer Screening Between the ages of 21-29, pap smear recommended once every 3 years  Between the ages of 33-67, can perform pap smear with HPV co-testing every 5 years     Recommendations may differ for women with a history of total hysterectomy, cervical cancer, or abnormal pap smears in past  Pap Smear: Not on file    Screening Not Indicated   Hepatitis C Screening Once for adults born between 1945 and 1965  More frequently in patients at high risk for Hepatitis C Hep C Antibody: 10/15/2021    Screening Current   Diabetes Screening 1-2 times per year if you're at risk for diabetes or have pre-diabetes Fasting glucose: No results in last 5 years (No results in last 5 years)  A1C: 8 0 % (8/15/2022)  Screening Not Indicated  History Diabetes   Cholesterol Screening Once every 5 years if you don't have a lipid disorder  May order more often based on risk factors  Lipid panel: 10/29/2020    Screening Current     Other Preventive Screenings Covered by Medicare:  1  Abdominal Aortic Aneurysm (AAA) Screening: covered once if your at risk  You're considered to be at risk if you have a family history of AAA  2  Lung Cancer Screening: covers low dose CT scan once per year if you meet all of the following conditions: (1) Age 50-69; (2) No signs or symptoms of lung cancer; (3) Current smoker or have quit smoking within the last 15 years; (4) You have a tobacco smoking history of at least 20 pack years (packs per day multiplied by number of years you smoked); (5) You get a written order from a healthcare provider  3  Glaucoma Screening: covered annually if you're considered high risk: (1) You have diabetes OR (2) Family history of glaucoma OR (3)  aged 48 and older OR (3)  American aged 72 and older  3  Osteoporosis Screening: covered every 2 years if you meet one of the following conditions: (1) You're estrogen deficient and at risk for osteoporosis based off medical history and other findings; (2) Have a vertebral abnormality; (3) On glucocorticoid therapy for more than 3 months; (4) Have primary hyperparathyroidism; (5) On osteoporosis medications and need to assess response to drug therapy  · Last bone density test (DXA Scan): Not on file  5  HIV Screening: covered annually if you're between the age of 12-76  Also covered annually if you are younger than 13 and older than 72 with risk factors for HIV infection   For pregnant patients, it is covered up to 3 times per pregnancy  Immunizations:  Immunization Recommendations   Influenza Vaccine Annual influenza vaccination during flu season is recommended for all persons aged >= 6 months who do not have contraindications   Pneumococcal Vaccine   * Pneumococcal conjugate vaccine = PCV13 (Prevnar 13), PCV15 (Vaxneuvance), PCV20 (Prevnar 20)  * Pneumococcal polysaccharide vaccine = PPSV23 (Pneumovax) Adults 25-60 years old: 1-3 doses may be recommended based on certain risk factors  Adults 72 years old: 1-2 doses may be recommended based off what pneumonia vaccine you previously received   Hepatitis B Vaccine 3 dose series if at intermediate or high risk (ex: diabetes, end stage renal disease, liver disease)   Tetanus (Td) Vaccine - COST NOT COVERED BY MEDICARE PART B Following completion of primary series, a booster dose should be given every 10 years to maintain immunity against tetanus  Td may also be given as tetanus wound prophylaxis  Tdap Vaccine - COST NOT COVERED BY MEDICARE PART B Recommended at least once for all adults  For pregnant patients, recommended with each pregnancy  Shingles Vaccine (Shingrix) - COST NOT COVERED BY MEDICARE PART B  2 shot series recommended in those aged 48 and above     Health Maintenance Due:      Topic Date Due   • Hepatitis C Screening  Completed     Immunizations Due:      Topic Date Due   • Hepatitis B Vaccine (1 of 3 - 3-dose series) Never done   • Pneumococcal Vaccine: 65+ Years (1 - PCV) Never done     Advance Directives   What are advance directives? Advance directives are legal documents that state your wishes and plans for medical care  These plans are made ahead of time in case you lose your ability to make decisions for yourself  Advance directives can apply to any medical decision, such as the treatments you want, and if you want to donate organs  What are the types of advance directives?   There are many types of advance directives, and each state has rules about how to use them  You may choose a combination of any of the following:  · Living will: This is a written record of the treatment you want  You can also choose which treatments you do not want, which to limit, and which to stop at a certain time  This includes surgery, medicine, IV fluid, and tube feedings  · Durable power of  for healthcare [facility]): This is a written record that states who you want to make healthcare choices for you when you are unable to make them for yourself  This person, called a proxy, is usually a family member or a friend  You may choose more than 1 proxy  · Do not resuscitate (DNR) order:  A DNR order is used in case your heart stops beating or you stop breathing  It is a request not to have certain forms of treatment, such as CPR  A DNR order may be included in other types of advance directives  · Medical directive: This covers the care that you want if you are in a coma, near death, or unable to make decisions for yourself  You can list the treatments you want for each condition  Treatment may include pain medicine, surgery, blood transfusions, dialysis, IV or tube feedings, and a ventilator (breathing machine)  · Values history: This document has questions about your views, beliefs, and how you feel and think about life  This information can help others choose the care that you would choose  Why are advance directives important? An advance directive helps you control your care  Although spoken wishes may be used, it is better to have your wishes written down  Spoken wishes can be misunderstood, or not followed  Treatments may be given even if you do not want them  An advance directive may make it easier for your family to make difficult choices about your care  Urinary Incontinence   Urinary incontinence (UI)  is when you lose control of your bladder  UI develops because your bladder cannot store or empty urine properly   The 3 most common types of UI are stress incontinence, urge incontinence, or both  Medicines:   · May be given to help strengthen your bladder control  Report any side effects of medication to your healthcare provider  Do pelvic muscle exercises often:  Your pelvic muscles help you stop urinating  Squeeze these muscles tight for 5 seconds, then relax for 5 seconds  Gradually work up to squeezing for 10 seconds  Do 3 sets of 15 repetitions a day, or as directed  This will help strengthen your pelvic muscles and improve bladder control  Train your bladder:  Go to the bathroom at set times, such as every 2 hours, even if you do not feel the urge to go  You can also try to hold your urine when you feel the urge to go  For example, hold your urine for 5 minutes when you feel the urge to go  As that becomes easier, hold your urine for 10 minutes  Self-care:   · Keep a UI record  Write down how often you leak urine and how much you leak  Make a note of what you were doing when you leaked urine  · Drink liquids as directed  You may need to limit the amount of liquid you drink to help control your urine leakage  Do not drink any liquid right before you go to bed  Limit or do not have drinks that contain caffeine or alcohol  · Prevent constipation  Eat a variety of high-fiber foods  Good examples are high-fiber cereals, beans, vegetables, and whole-grain breads  Walking is the best way to trigger your intestines to have a bowel movement  · Exercise regularly and maintain a healthy weight  Weight loss and exercise will decrease pressure on your bladder and help you control your leakage  · Use a catheter as directed  to help empty your bladder  A catheter is a tiny, plastic tube that is put into your bladder to drain your urine  · Go to behavior therapy as directed  Behavior therapy may be used to help you learn to control your urge to urinate      Weight Management   Why it is important to manage your weight:  Being overweight increases your risk of health conditions such as heart disease, high blood pressure, type 2 diabetes, and certain types of cancer  It can also increase your risk for osteoarthritis, sleep apnea, and other respiratory problems  Aim for a slow, steady weight loss  Even a small amount of weight loss can lower your risk of health problems  How to lose weight safely:  A safe and healthy way to lose weight is to eat fewer calories and get regular exercise  You can lose up about 1 pound a week by decreasing the number of calories you eat by 500 calories each day  Healthy meal plan for weight management:  A healthy meal plan includes a variety of foods, contains fewer calories, and helps you stay healthy  A healthy meal plan includes the following:  · Eat whole-grain foods more often  A healthy meal plan should contain fiber  Fiber is the part of grains, fruits, and vegetables that is not broken down by your body  Whole-grain foods are healthy and provide extra fiber in your diet  Some examples of whole-grain foods are whole-wheat breads and pastas, oatmeal, brown rice, and bulgur  · Eat a variety of vegetables every day  Include dark, leafy greens such as spinach, kale, loly greens, and mustard greens  Eat yellow and orange vegetables such as carrots, sweet potatoes, and winter squash  · Eat a variety of fruits every day  Choose fresh or canned fruit (canned in its own juice or light syrup) instead of juice  Fruit juice has very little or no fiber  · Eat low-fat dairy foods  Drink fat-free (skim) milk or 1% milk  Eat fat-free yogurt and low-fat cottage cheese  Try low-fat cheeses such as mozzarella and other reduced-fat cheeses  · Choose meat and other protein foods that are low in fat  Choose beans or other legumes such as split peas or lentils  Choose fish, skinless poultry (chicken or turkey), or lean cuts of red meat (beef or pork)  Before you cook meat or poultry, cut off any visible fat     · Use less fat and oil  Try baking foods instead of frying them  Add less fat, such as margarine, sour cream, regular salad dressing and mayonnaise to foods  Eat fewer high-fat foods  Some examples of high-fat foods include french fries, doughnuts, ice cream, and cakes  · Eat fewer sweets  Limit foods and drinks that are high in sugar  This includes candy, cookies, regular soda, and sweetened drinks  Exercise:  Exercise at least 30 minutes per day on most days of the week  Some examples of exercise include walking, biking, dancing, and swimming  You can also fit in more physical activity by taking the stairs instead of the elevator or parking farther away from stores  Ask your healthcare provider about the best exercise plan for you  © Copyright [employer] 2018 Information is for End User's use only and may not be sold, redistributed or otherwise used for commercial purposes   All illustrations and images included in CareNotes® are the copyrighted property of A D A M , Inc  or [address] [place] [address]

## 2022-12-16 ENCOUNTER — TELEPHONE (OUTPATIENT)
Dept: INTERNAL MEDICINE CLINIC | Facility: CLINIC | Age: 82
End: 2022-12-16

## 2022-12-16 NOTE — TELEPHONE ENCOUNTER
----- Message from Parris Lopez DO sent at 12/16/2022  8:21 AM EST -----  Chest x ray shows clear lungs

## 2022-12-17 DIAGNOSIS — I10 ESSENTIAL HYPERTENSION: ICD-10-CM

## 2022-12-17 RX ORDER — BENAZEPRIL HYDROCHLORIDE 10 MG/1
TABLET ORAL
Qty: 90 TABLET | Refills: 1 | Status: SHIPPED | OUTPATIENT
Start: 2022-12-17

## 2023-01-10 ENCOUNTER — TELEPHONE (OUTPATIENT)
Dept: INTERNAL MEDICINE CLINIC | Facility: CLINIC | Age: 83
End: 2023-01-10

## 2023-01-10 DIAGNOSIS — R53.1 GENERALIZED WEAKNESS: Primary | ICD-10-CM

## 2023-01-10 LAB
LEFT EYE DIABETIC RETINOPATHY: NORMAL
RIGHT EYE DIABETIC RETINOPATHY: NORMAL

## 2023-01-11 NOTE — TELEPHONE ENCOUNTER
Nora / Aleja Copeland    Received referral for Patient   It doesn't specify which skill you're wanting her to have such as PT, OT, skilled nursing, speech or medical social worker  ALSO,  has a diagnosis for generalized weakness and that is not a diagnosis that they will be accepted for home health  So   Alexander Melendez needs more information     Please Call : Liz Kiser

## 2023-01-15 DIAGNOSIS — I10 ESSENTIAL HYPERTENSION: ICD-10-CM

## 2023-02-02 DIAGNOSIS — I10 ESSENTIAL HYPERTENSION: ICD-10-CM

## 2023-02-02 RX ORDER — SUCRALFATE 1 G/1
TABLET ORAL
Qty: 360 TABLET | Refills: 5 | Status: SHIPPED | OUTPATIENT
Start: 2023-02-02 | End: 2023-02-03

## 2023-02-08 ENCOUNTER — NURSE TRIAGE (OUTPATIENT)
Dept: OTHER | Facility: OTHER | Age: 83
End: 2023-02-08

## 2023-02-08 ENCOUNTER — TELEPHONE (OUTPATIENT)
Dept: GASTROENTEROLOGY | Facility: CLINIC | Age: 83
End: 2023-02-08

## 2023-02-08 NOTE — TELEPHONE ENCOUNTER
Patient called in with questions regarding diabetic medications and diet restrictions for colonoscopy scheduled for Monday 2/13/23  Patient takes [place] insulin in the evening  Should she take full dose evening before procedure when instructions say take full dose as scheduled in the AM  Please follow up with patient  Reviewed diet restrictions; had cucumber and tomatoes today  Will refrain from eating more until after procedure  Reason for Disposition  • Bowel prep for colonoscopy, questions about    Answer Assessment - Initial Assessment Questions  1  DATE/TIME: "When did you have your colonoscopy?"       Scheduled for 2/13/23  2   MAIN CONCERN: "What is your main concern right now?" "What questions do you have?"     Diabetic medication questions    Protocols used: COLONOSCOPY SYMPTOMS AND QUESTIONS-ADULT-

## 2023-02-08 NOTE — TELEPHONE ENCOUNTER
L/M for pt to call back with further questions - left message to take her Diabetic meds exactly how it states on the prep paperwork for the day before and day of procedure

## 2023-02-08 NOTE — TELEPHONE ENCOUNTER
Regarding: Colonoscopy prep diabetic  ----- Message from Kathrine Mcintosh sent at 2/8/2023  2:57 PM EST -----  "I have a colonoscopy coming up and I'm diabetic  The instructions say to take my meds the day before but I take mine at night before bed   Is this ok?"

## 2023-02-13 ENCOUNTER — HOSPITAL ENCOUNTER (OUTPATIENT)
Dept: GASTROENTEROLOGY | Facility: HOSPITAL | Age: 83
Setting detail: OUTPATIENT SURGERY
Discharge: HOME | End: 2023-02-13
Attending: INTERNAL MEDICINE | Admitting: INTERNAL MEDICINE

## 2023-02-13 ENCOUNTER — ANESTHESIA EVENT (OUTPATIENT)
Dept: GASTROENTEROLOGY | Facility: HOSPITAL | Age: 83
End: 2023-02-13

## 2023-02-13 ENCOUNTER — ANESTHESIA (OUTPATIENT)
Dept: GASTROENTEROLOGY | Facility: HOSPITAL | Age: 83
End: 2023-02-13

## 2023-02-13 VITALS
HEIGHT: 60 IN | SYSTOLIC BLOOD PRESSURE: 104 MMHG | BODY MASS INDEX: 42.59 KG/M2 | HEART RATE: 73 BPM | RESPIRATION RATE: 19 BRPM | OXYGEN SATURATION: 94 % | DIASTOLIC BLOOD PRESSURE: 50 MMHG | TEMPERATURE: 98.3 F | WEIGHT: 216.93 LBS

## 2023-02-13 DIAGNOSIS — K59.09 CHRONIC CONSTIPATION: ICD-10-CM

## 2023-02-13 PROBLEM — G47.30 SLEEP APNEA: Status: ACTIVE | Noted: 2023-02-13

## 2023-02-13 PROBLEM — Z95.1 HX OF CABG: Status: ACTIVE | Noted: 2023-02-13

## 2023-02-13 LAB — GLUCOSE SERPL-MCNC: 168 MG/DL (ref 65–140)

## 2023-02-13 RX ORDER — DEXMEDETOMIDINE HYDROCHLORIDE 100 UG/ML
INJECTION, SOLUTION INTRAVENOUS AS NEEDED
Status: DISCONTINUED | OUTPATIENT
Start: 2023-02-13 | End: 2023-02-13

## 2023-02-13 RX ORDER — LIDOCAINE HYDROCHLORIDE 20 MG/ML
INJECTION, SOLUTION EPIDURAL; INFILTRATION; INTRACAUDAL; PERINEURAL AS NEEDED
Status: DISCONTINUED | OUTPATIENT
Start: 2023-02-13 | End: 2023-02-13

## 2023-02-13 RX ORDER — PROPOFOL 10 MG/ML
INJECTION, EMULSION INTRAVENOUS AS NEEDED
Status: DISCONTINUED | OUTPATIENT
Start: 2023-02-13 | End: 2023-02-13

## 2023-02-13 RX ORDER — INSULIN ASPART 100 [IU]/ML
INJECTION, SUSPENSION SUBCUTANEOUS
COMMUNITY

## 2023-02-13 RX ORDER — SODIUM CHLORIDE, SODIUM LACTATE, POTASSIUM CHLORIDE, CALCIUM CHLORIDE 600; 310; 30; 20 MG/100ML; MG/100ML; MG/100ML; MG/100ML
INJECTION, SOLUTION INTRAVENOUS CONTINUOUS PRN
Status: DISCONTINUED | OUTPATIENT
Start: 2023-02-13 | End: 2023-02-13

## 2023-02-13 RX ADMIN — PROPOFOL 20 MG: 10 INJECTION, EMULSION INTRAVENOUS at 08:20

## 2023-02-13 RX ADMIN — SODIUM CHLORIDE, SODIUM LACTATE, POTASSIUM CHLORIDE, AND CALCIUM CHLORIDE: .6; .31; .03; .02 INJECTION, SOLUTION INTRAVENOUS at 08:12

## 2023-02-13 RX ADMIN — DEXMEDETOMIDINE HCL 4 MCG: 100 INJECTION INTRAVENOUS at 08:26

## 2023-02-13 RX ADMIN — PROPOFOL 20 MG: 10 INJECTION, EMULSION INTRAVENOUS at 08:24

## 2023-02-13 RX ADMIN — DEXMEDETOMIDINE HCL 4 MCG: 100 INJECTION INTRAVENOUS at 08:20

## 2023-02-13 RX ADMIN — PROPOFOL 40 MG: 10 INJECTION, EMULSION INTRAVENOUS at 08:26

## 2023-02-13 RX ADMIN — DEXMEDETOMIDINE HCL 8 MCG: 100 INJECTION INTRAVENOUS at 08:16

## 2023-02-13 RX ADMIN — DEXMEDETOMIDINE HCL 4 MCG: 100 INJECTION INTRAVENOUS at 08:22

## 2023-02-13 RX ADMIN — PROPOFOL 20 MG: 10 INJECTION, EMULSION INTRAVENOUS at 08:29

## 2023-02-13 RX ADMIN — PROPOFOL 20 MG: 10 INJECTION, EMULSION INTRAVENOUS at 08:23

## 2023-02-13 RX ADMIN — PROPOFOL 20 MG: 10 INJECTION, EMULSION INTRAVENOUS at 08:31

## 2023-02-13 RX ADMIN — LIDOCAINE HYDROCHLORIDE 100 MG: 20 INJECTION, SOLUTION EPIDURAL; INFILTRATION; INTRACAUDAL at 08:15

## 2023-02-13 RX ADMIN — PROPOFOL 80 MG: 10 INJECTION, EMULSION INTRAVENOUS at 08:17

## 2023-02-13 RX ADMIN — DEXMEDETOMIDINE HCL 4 MCG: 100 INJECTION INTRAVENOUS at 08:24

## 2023-02-13 RX ADMIN — DEXMEDETOMIDINE HCL 4 MCG: 100 INJECTION INTRAVENOUS at 08:18

## 2023-02-13 NOTE — ANESTHESIA POSTPROCEDURE EVALUATION
Post-Op Assessment Note    CV Status:  Stable  Pain Score: 0    Pain management: adequate     Mental Status:  Awake   Hydration Status:  Stable   PONV Controlled:  Controlled   Airway Patency:  Patent      Post Op Vitals Reviewed: Yes      Staff: CRNA         No notable events documented      BP   106/52   Temp      Pulse  75   Resp   10   SpO2   97

## 2023-02-13 NOTE — H&P
History and Physical - [facility]  Ashly El [de-identified] y o  female MRN: [id number]      HPI: Ashly El is a [de-identified]y o  year old female who presents for evaluation of constipation      REVIEW OF SYSTEMS: Per the HPI, and otherwise unremarkable  Historical Information   Past Medical History:   Diagnosis Date   • Diabetes mellitus ([address] )    • Hyperlipidemia    • Hypertension      Past Surgical History:   Procedure Laterality Date   • CARDIAC SURGERY     • COLONOSCOPY       Social History   Social History     Substance and Sexual Activity   Alcohol Use Yes   • Alcohol/week: 1 0 standard drink   • Types: 1 Glasses of wine per week    Comment: rarely      Social History     Substance and Sexual Activity   Drug Use Never     Social History     Tobacco Use   Smoking Status Never   Smokeless Tobacco Never     Family History   Problem Relation Age of Onset   • Heart disease Mother    • Diabetes Mother    • Hypertension Mother    • Heart disease Father    • Hypertension Father    • Diabetes Father        Meds/Allergies     (Not in a hospital admission)      Allergies   Allergen Reactions   • Keflex [Cephalexin] Hives   • Penicillins Rash   • Atorvastatin Other (See Comments)     Other reaction(s): Muscle pain  Other reaction(s): Muscle pain     • Metformin Diarrhea     Severe diarrhea  Severe diarrhea         Objective     Blood pressure 132/61, pulse 80, temperature 98 °F (36 7 °C), temperature source Temporal, resp  rate 18, height 5' (1 524 m), weight 98 4 kg (216 lb 14 9 oz), SpO2 95 %  PHYSICAL EXAM    Gen: NAD  CV: RRR  CHEST: Clear  ABD: soft, NT/ND  EXT: no edema      ASSESSMENT/PLAN:  This is a [de-identified]y o  year old female here for colonoscopy, and she is stable and optimized for her procedure

## 2023-02-13 NOTE — ANESTHESIA PREPROCEDURE EVALUATION
Procedure:  COLONOSCOPY    Relevant Problems   ANESTHESIA   (-) History of anesthesia complications      CARDIO   (+) Coronary artery disease involving coronary bypass graft of native heart without angina pectoris   (+) Essential hypertension   (+) Hx of CABG (2019)   (+) PVD (peripheral vascular disease) (HCC)   (-) Chest pain      ENDO   (+) Type 2 diabetes mellitus without complication, with long-term current use of insulin (HCC)      PULMONARY   (+) Sleep apnea (not using CPAP)   (-) Shortness of breath   (-) URI (upper respiratory infection)      Other   (+) Body mass index (BMI) of 40 0 to 44 9 in adult [facility])        Physical Exam    Airway    Mallampati score: II  TM Distance: >3 FB  Neck ROM: full     Dental       Cardiovascular      Pulmonary      Other Findings        Anesthesia Plan  ASA Score- 3     Anesthesia Type- IV sedation with anesthesia with ASA Monitors  Additional Monitors:   Airway Plan:           Plan Factors-Exercise tolerance (METS): <4 METS  Exercise comment: 2/2 LE claudication  Chart reviewed  EKG reviewed  Existing labs reviewed  Patient summary reviewed  Induction- intravenous  Postoperative Plan-     Informed Consent- Anesthetic plan and risks discussed with patient  I personally reviewed this patient with the CRNA  Discussed and agreed on the Anesthesia Plan with the CRNA  Irma Cook

## 2023-02-16 ENCOUNTER — TELEPHONE (OUTPATIENT)
Dept: GASTROENTEROLOGY | Facility: CLINIC | Age: 83
End: 2023-02-16

## 2023-02-16 NOTE — TELEPHONE ENCOUNTER
----- Message from Ruma Little DO sent at 2/16/2023  2:30 PM EST -----  Please call the patient with the polyp results  Polyp of the transverse colon was a precancerous adenoma and completely removed  The polyp of the rectum was a precancerous adenoma and completely removed    Based on the patient's age, she will no longer require any additional colonoscopy

## 2023-03-02 ENCOUNTER — TELEPHONE (OUTPATIENT)
Dept: INTERNAL MEDICINE CLINIC | Facility: CLINIC | Age: 83
End: 2023-03-02

## 2023-03-02 DIAGNOSIS — R53.1 GENERALIZED WEAKNESS: Primary | ICD-10-CM

## 2023-03-02 NOTE — TELEPHONE ENCOUNTER
PT with [facility] Chowdhury is coming to an end  Can Dr Nancy Vgea put in a referral for the pt to continue PT through [facility] in the home    If the dr has any questions; she can call Prieto Tolbert with [facility]      The referral can be Faxed to this F#: [phone] with [facility])- they will contact the pt to schedule the in home rehab

## 2023-03-03 ENCOUNTER — OFFICE VISIT (OUTPATIENT)
Dept: INTERNAL MEDICINE CLINIC | Facility: CLINIC | Age: 83
End: 2023-03-03

## 2023-03-03 VITALS
RESPIRATION RATE: 18 BRPM | SYSTOLIC BLOOD PRESSURE: 110 MMHG | DIASTOLIC BLOOD PRESSURE: 62 MMHG | HEART RATE: 82 BPM | TEMPERATURE: 97.9 F | WEIGHT: 222.6 LBS | BODY MASS INDEX: 43.47 KG/M2 | OXYGEN SATURATION: 93 %

## 2023-03-03 DIAGNOSIS — M19.041 PRIMARY OSTEOARTHRITIS OF BOTH HANDS: Primary | ICD-10-CM

## 2023-03-03 DIAGNOSIS — I73.9 PVD (PERIPHERAL VASCULAR DISEASE) (HCC): ICD-10-CM

## 2023-03-03 DIAGNOSIS — E11.51 TYPE 2 DIABETES MELLITUS WITH DIABETIC PERIPHERAL ANGIOPATHY WITHOUT GANGRENE, WITH LONG-TERM CURRENT USE OF INSULIN (HCC): ICD-10-CM

## 2023-03-03 DIAGNOSIS — E66.01 MORBID (SEVERE) OBESITY DUE TO EXCESS CALORIES (HCC): ICD-10-CM

## 2023-03-03 DIAGNOSIS — J34.89 RHINORRHEA: ICD-10-CM

## 2023-03-03 DIAGNOSIS — Z79.4 TYPE 2 DIABETES MELLITUS WITH DIABETIC PERIPHERAL ANGIOPATHY WITHOUT GANGRENE, WITH LONG-TERM CURRENT USE OF INSULIN (HCC): ICD-10-CM

## 2023-03-03 DIAGNOSIS — M19.042 PRIMARY OSTEOARTHRITIS OF BOTH HANDS: Primary | ICD-10-CM

## 2023-03-03 DIAGNOSIS — M54.50 LUMBOSACRAL PAIN: ICD-10-CM

## 2023-03-03 LAB — SL AMB POCT HEMOGLOBIN AIC: 7.8 (ref ?–6.5)

## 2023-03-03 RX ORDER — MONTELUKAST SODIUM 10 MG/1
TABLET ORAL
Qty: 90 TABLET | Refills: 1 | Status: SHIPPED | OUTPATIENT
Start: 2023-03-03

## 2023-03-03 RX ORDER — IPRATROPIUM BROMIDE 42 UG/1
SPRAY, METERED NASAL
COMMUNITY
Start: 2022-11-29

## 2023-03-03 RX ORDER — CYCLOSPORINE 0.5 MG/ML
EMULSION OPHTHALMIC
COMMUNITY
Start: 2023-01-10

## 2023-03-03 NOTE — PROGRESS NOTES
Name: Rosario Merchant      : 1940      MRN: [id number]  Encounter Provider: Rach Guzmán DO  Encounter Date: 3/3/2023   [facility]: [facility]   [address]     1  Primary osteoarthritis of both hands-longstanding issue  Patient encouraged to use OTC analgesia, warm hand soaks with Epsom salt, and to follow with PT/OT hand  -     Ambulatory Referral to PT/OT Hand Therapy; Future    2  Type 2 diabetes mellitus with diabetic peripheral angiopathy without gangrene, with long-term current use of insulin ([address] )- a1c 7 8  BG in 200s  Will add metformin to current regimen  Patient suffers from chronic constipation and doesn't have a true allergy to metformin  Patient is amendable to trying once more  -     POCT hemoglobin A1c  -     metFORMIN (GLUCOPHAGE) 500 mg tablet; Take 1 tablet (500 mg total) by mouth daily with breakfast    3  PVD (peripheral vascular disease) (HCC)-chronic issue  Patient on a statin  Patient to continue  4  Morbid (severe) obesity due to excess calories ([address] )- note counseling below     5  Lumbosacral pain- known DDD of the lumbar spine and scoliosis  encouraged continue use of OTC analgesia  Already receiving physical therapy for her lower extremities  We will add PT referral for back as well  BMI Counseling: Body mass index is 43 47 kg/m²  Follow-up plan was not completed due to elderly patient (74 years old) where weight reduction/weight gain would complicate underlying health condition such as: illness or physical disability  Depression Screening and Follow-up Plan: Patient was screened for depression during today's encounter  They screened negative with a PHQ-2 score of 0  Subjective      Pt presents c/o pian and constipation  Both issues are longstanding  Currently receiving  PT at home for LE weakness and knee OA  [facility] for [facility]   Recently her hands and her low back have been bothering her a lot more  Increase stiffness  Limited ability to do things with her hands without pain  Taking otc analgesia that help with back more than her hands  Recent colonoscopy  Pt reports since her constipation and GI upset is worse  Blood sugars have been running in the 200  Reports taking her basal and prandial insulin as well as her glipizde  Review of Systems   Constitutional: Positive for fatigue  Negative for fever  Respiratory: Negative for shortness of breath  Cardiovascular: Negative for chest pain  Musculoskeletal: Positive for arthralgias, back pain and gait problem         Current Outpatient Medications on File Prior to Visit   Medication Sig   • Accu-Chek FastClix Lancets MISC Use   • ascorbic Acid (VITAMIN C) 500 MG CPCR capsule, extended release: 500 mg 1 capsule twice a day by mouth   • Aspirin Buf,CaCarb-MgCarb-MgO, 81 MG TABS Take 81 mg by mouth   • B Complex-Biotin-FA (HM Vitamin B100 Complex) TABS Take by mouth   • benazepril (LOTENSIN) 10 mg tablet TAKE 1 TABLET BY MOUTH EVERY DAY   • Blood Glucose Monitoring Suppl (Accu-Chek Guide) w/Device KIT Use   • cilostazol (PLETAL) 100 mg tablet Take 100 mg by mouth 2 (two) times a day   • fluticasone (FLONASE) 50 mcg/act nasal spray 1 SPRAY INTO EACH NOSTRIL DAILY AT BEDTIME   • furosemide (LASIX) 20 mg tablet TAKE 1 TABLET BY MOUTH TWICE A DAY   • glipiZIDE (GLUCOTROL XL) 5 mg 24 hr tablet    • glucose blood (Accu-Chek Guide) test strip TEST 4 TIMES DAILY DX E11 65   • glucose blood test strip Use 1 each daily as needed Use as instructed   • insulin aspart protamine-insulin aspart (NovoLOG 70/30) 100 units/mL injection Inject under the skin 2 (two) times a day before meals   • insulin degludec [place] FlexTouch) 100 units/mL injection pen Inject 5 Units under the skin   • ipratropium (ATROVENT) 0 06 % nasal spray    • magnesium hydroxide (MILK OF MAGNESIA) 400 mg/5 mL oral suspension Take by mouth daily as needed   • metoprolol tartrate (LOPRESSOR) 25 mg tablet TAKE 1 TABLET BY MOUTH EVERY DAY   • montelukast (SINGULAIR) 10 mg tablet Take 1 tablet (10 mg total) by mouth daily at bedtime   • ondansetron (ZOFRAN-ODT) 4 mg disintegrating tablet TAKE 1 TABLET (4 MG TOTAL) BY MOUTH EVERY 6 (SIX) HOURS AS NEEDED FOR NAUSEA OR VOMITING   • polyethylene glycol (GLYCOLAX) 17 GM/SCOOP powder Take 17 g by mouth daily   • Probiotic Product (Align) 4 MG CAPS Take 1 capsule by mouth   • Restasis 0 05 % ophthalmic emulsion INSTILL 1 DROP INTO BOTH EYES TWICE A DAY   • simethicone (MYLICON) 80 mg chewable tablet Chew 80 mg   • simvastatin (ZOCOR) 40 mg tablet TAKE 1 TABLET BY MOUTH EVERY DAY IN THE EVENING   • sucralfate (CARAFATE) 1 g tablet TAKE 1 TABLET BY ORAL ROUTE 4 TIMES EVERY DAY ON AN EMPTY STOMACH 1 HOUR BEFORE MEALS AND AT BEDTIME   • [DISCONTINUED] ammonium lactate (LAC-HYDRIN) 12 % cream Apply topically as needed for dry skin (Patient not taking: No sig reported)   • [DISCONTINUED] meclizine (ANTIVERT) 25 mg tablet Take 1 tablet (25 mg total) by mouth every 12 (twelve) hours as needed for dizziness (Patient not taking: Reported on 8/29/2022)       Objective     /62 (BP Location: Left arm, Patient Position: Sitting, Cuff Size: Standard)   Pulse 82   Temp 97 9 °F (36 6 °C) (Temporal)   Resp 18   Wt 101 kg (222 lb 9 6 oz)   SpO2 93%   BMI 43 47 kg/m²     Physical Exam  HENT:      Head: Normocephalic  Cardiovascular:      Rate and Rhythm: Normal rate and regular rhythm  Pulmonary:      Effort: Pulmonary effort is normal       Breath sounds: Normal breath sounds  Musculoskeletal:         General: Tenderness and deformity (herbeden nodes on b/l hands ) present  Right hand: Bony tenderness present  Decreased range of motion  Left hand: Bony tenderness present  Decreased range of motion  Lumbar back: Spasms and tenderness present          Back:    Neurological:      Mental Status: She is alert and oriented to person, place, and time        Gait: Gait abnormal        Jesus Wise, DO

## 2023-03-07 ENCOUNTER — TELEPHONE (OUTPATIENT)
Dept: INTERNAL MEDICINE CLINIC | Facility: CLINIC | Age: 83
End: 2023-03-07

## 2023-03-07 NOTE — TELEPHONE ENCOUNTER
Patient would like to know if there is additional blood work that you would like for her to have done other than the A1C she did on 3/3 at her visit

## 2023-03-15 ENCOUNTER — TELEPHONE (OUTPATIENT)
Dept: INTERNAL MEDICINE CLINIC | Facility: CLINIC | Age: 83
End: 2023-03-15

## 2023-03-15 NOTE — TELEPHONE ENCOUNTER
Neo Rdz from [facility] (OUTPATIENT [facility]) received physical therapy orders, unfortunately they do not do physical therapy but they can do occupational therapy, unless you want patient to do out patient physical therapy    To fax occupational therapy order   # [phone]

## 2023-03-17 NOTE — TELEPHONE ENCOUNTER
I spoke to pt she is aware of her referral and she said for ambulatory is going to try to have it done at [facility] and the other she is awaiting for them to get back to her

## 2023-03-26 DIAGNOSIS — E11.9 TYPE 2 DIABETES MELLITUS WITHOUT COMPLICATION, WITH LONG-TERM CURRENT USE OF INSULIN (HCC): ICD-10-CM

## 2023-03-26 DIAGNOSIS — I10 ESSENTIAL HYPERTENSION: ICD-10-CM

## 2023-03-26 DIAGNOSIS — Z79.4 TYPE 2 DIABETES MELLITUS WITHOUT COMPLICATION, WITH LONG-TERM CURRENT USE OF INSULIN (HCC): ICD-10-CM

## 2023-03-26 RX ORDER — BENAZEPRIL HYDROCHLORIDE 10 MG/1
TABLET ORAL
Qty: 90 TABLET | Refills: 1 | Status: SHIPPED | OUTPATIENT
Start: 2023-03-26

## 2023-03-27 DIAGNOSIS — Z79.4 TYPE 2 DIABETES MELLITUS WITH DIABETIC PERIPHERAL ANGIOPATHY WITHOUT GANGRENE, WITH LONG-TERM CURRENT USE OF INSULIN (HCC): ICD-10-CM

## 2023-03-27 DIAGNOSIS — E11.51 TYPE 2 DIABETES MELLITUS WITH DIABETIC PERIPHERAL ANGIOPATHY WITHOUT GANGRENE, WITH LONG-TERM CURRENT USE OF INSULIN (HCC): ICD-10-CM

## 2023-03-27 RX ORDER — SIMVASTATIN 40 MG
TABLET ORAL
Qty: 90 TABLET | Refills: 2 | Status: SHIPPED | OUTPATIENT
Start: 2023-03-27

## 2023-04-19 PROBLEM — K57.90 DIVERTICULOSIS: Status: ACTIVE | Noted: 2023-04-19

## 2023-05-01 ENCOUNTER — TELEPHONE (OUTPATIENT)
Age: 83
End: 2023-05-01

## 2023-05-01 NOTE — TELEPHONE ENCOUNTER
I called Raysa Hoffman her phone is not working properly voice goes in and out I did left her a voice mail and her consent form on file has not been filled out

## 2023-05-10 ENCOUNTER — TELEPHONE (OUTPATIENT)
Age: 83
End: 2023-05-10

## 2023-05-10 NOTE — TELEPHONE ENCOUNTER
Patient wanted Dr Hui Rodriguez to be aware that the area where pt has shingles has gone numb      Is this normal?    Please Advise: [phone]

## 2023-05-10 NOTE — TELEPHONE ENCOUNTER
Yes  The sensation should come back in a few weeks  If there is any pain there are medications to try and help relieve it

## 2023-06-04 ENCOUNTER — RA CDI HCC (OUTPATIENT)
Dept: OTHER | Facility: HOSPITAL | Age: 83
End: 2023-06-04

## 2023-06-04 NOTE — PROGRESS NOTES
E11 65  [facility]  coding opportunities          Chart Reviewed number of suggestions sent to Provider: 1     Patients Insurance     Medicare Insurance: [employer]

## 2023-06-12 ENCOUNTER — OFFICE VISIT (OUTPATIENT)
Age: 83
End: 2023-06-12
Payer: MEDICARE

## 2023-06-12 VITALS
OXYGEN SATURATION: 98 % | BODY MASS INDEX: 43.75 KG/M2 | TEMPERATURE: 97.5 F | RESPIRATION RATE: 18 BRPM | DIASTOLIC BLOOD PRESSURE: 62 MMHG | HEART RATE: 80 BPM | SYSTOLIC BLOOD PRESSURE: 110 MMHG | WEIGHT: 224 LBS

## 2023-06-12 DIAGNOSIS — N28.1 RENAL CYST: ICD-10-CM

## 2023-06-12 DIAGNOSIS — I10 ESSENTIAL HYPERTENSION: ICD-10-CM

## 2023-06-12 DIAGNOSIS — I25.810 CORONARY ARTERY DISEASE INVOLVING CORONARY BYPASS GRAFT OF NATIVE HEART WITHOUT ANGINA PECTORIS: ICD-10-CM

## 2023-06-12 DIAGNOSIS — E11.9 TYPE 2 DIABETES MELLITUS WITHOUT COMPLICATION, WITH LONG-TERM CURRENT USE OF INSULIN (HCC): Primary | ICD-10-CM

## 2023-06-12 DIAGNOSIS — R53.1 GENERALIZED WEAKNESS: ICD-10-CM

## 2023-06-12 DIAGNOSIS — Z79.4 TYPE 2 DIABETES MELLITUS WITHOUT COMPLICATION, WITH LONG-TERM CURRENT USE OF INSULIN (HCC): Primary | ICD-10-CM

## 2023-06-12 PROBLEM — E11.51 TYPE 2 DIABETES MELLITUS WITH DIABETIC PERIPHERAL ANGIOPATHY WITHOUT GANGRENE, WITH LONG-TERM CURRENT USE OF INSULIN (HCC): Status: RESOLVED | Noted: 2022-09-19 | Resolved: 2023-06-12

## 2023-06-12 PROCEDURE — 99214 OFFICE O/P EST MOD 30 MIN: CPT | Performed by: FAMILY MEDICINE

## 2023-06-12 RX ORDER — PEN NEEDLE, DIABETIC 31 GX5/16"
NEEDLE, DISPOSABLE MISCELLANEOUS 4 TIMES DAILY
COMMUNITY
Start: 2023-04-26

## 2023-06-12 NOTE — PROGRESS NOTES
Name: Cleo President      : 1940      MRN: [id number]  Encounter Provider: Daquan Truong DO  Encounter Date: 2023   Encounter department: [address] [place] [address]     1  Type 2 diabetes mellitus without complication, with long-term current use of insulin ([address] )- fair control  Last a1c less than 8  Due for another soon  Follows with endocrine  Pt to continue glipizide 5 mg qd, degludec 5 units qhs and novolog 70/30 8 units preprandial    Pt declines iris  Has [facility] eye visit [facility]  -     Hemoglobin A1C; Future; Expected date: 2023    2  Essential hypertension- well controlled don benazepril 10mg qd and metoprolol tartrate 25mg qd,   -     Basic metabolic panel; Future    3  Coronary artery disease involving coronary bypass graft of native heart without angina pectoris- stable on daily  asa and statin  -     Lipid Panel with Direct LDL reflex; Future    4  Generalized weakness- improving with home PT  Pt was recommended to continue with outpt PT by her physical therapist  Not amendable to it at this time  encouraged to continue home exercises  5  Renal cyst- very small on b/l kidneys  F/u US kidney bladder has been ordered for interval change monitoring  Pt encouraged to schedule            Subjective      Presents for f/u  Shingles related pain has resolved  Skin gets midly itchy from time to time  No visible rash  Hasn't scheduled the renal u/s yet  Continues to receives home PT  Has 2 more sessions  It helps her stable mobile  Admits to poor motivation to exercise independently when she is home  Not amenable to outpatient therapy  Denies falls  Review of Systems   Constitutional: Negative for fever  Respiratory: Negative for shortness of breath  Cardiovascular: Negative for chest pain  Gastrointestinal: Positive for constipation  Negative for diarrhea     Musculoskeletal: Positive for arthralgias and gait problem  Skin: Negative for rash         Current Outpatient Medications on File Prior to Visit   Medication Sig   • Accu-Chek FastClix Lancets MISC Use   • ascorbic Acid (VITAMIN C) 500 MG CPCR capsule, extended release: 500 mg 1 capsule twice a day by mouth   • Aspirin Buf,CaCarb-MgCarb-MgO, 81 MG TABS Take 81 mg by mouth   • B Complex-Biotin-FA (HM Vitamin B100 Complex) TABS Take by mouth   • B-D ULTRAFINE III SHORT PEN 31G X 8 MM MISC 4 (four) times a day   • benazepril (LOTENSIN) 10 mg tablet TAKE 1 TABLET BY MOUTH EVERY DAY   • Blood Glucose Monitoring Suppl (Accu-Chek Guide) w/Device KIT Use   • cilostazol (PLETAL) 100 mg tablet Take 100 mg by mouth 2 (two) times a day   • fluticasone (FLONASE) 50 mcg/act nasal spray 1 SPRAY INTO EACH NOSTRIL DAILY AT BEDTIME   • furosemide (LASIX) 20 mg tablet TAKE 1 TABLET BY MOUTH TWICE A DAY (Patient taking differently: if needed)   • glipiZIDE (GLUCOTROL XL) 5 mg 24 hr tablet    • glucose blood (Accu-Chek Guide) test strip TEST 4 TIMES DAILY DX E11 65   • glucose blood test strip Use 1 each daily as needed Use as instructed   • insulin aspart protamine-insulin aspart (NovoLOG 70/30) 100 units/mL injection Inject 8 Units under the skin 2 (two) times a day before meals Sliding scale >140   • insulin degludec [place] FlexTouch) 100 units/mL injection pen Inject 5 Units under the skin   • ipratropium (ATROVENT) 0 06 % nasal spray    • lidocaine (XYLOCAINE) 5 % ointment Apply topically as needed for mild pain   • magnesium hydroxide (MILK OF MAGNESIA) 400 mg/5 mL oral suspension Take by mouth daily as needed   • metoprolol tartrate (LOPRESSOR) 25 mg tablet TAKE 1 TABLET BY MOUTH EVERY DAY   • montelukast (SINGULAIR) 10 mg tablet TAKE 1 TABLET BY MOUTH DAILY AT BEDTIME   • nystatin (MYCOSTATIN) cream Apply topically 2 (two) times a day   • ondansetron (ZOFRAN-ODT) 4 mg disintegrating tablet TAKE 1 TABLET (4 MG TOTAL) BY MOUTH EVERY 6 (SIX) HOURS AS NEEDED FOR NAUSEA OR VOMITING   • polyethylene glycol (GLYCOLAX) 17 GM/SCOOP powder Take 17 g by mouth daily   • Probiotic Product (Align) 4 MG CAPS Take 1 capsule by mouth   • Restasis 0 05 % ophthalmic emulsion INSTILL 1 DROP INTO BOTH EYES TWICE A DAY   • simethicone (MYLICON) 80 mg chewable tablet Chew 80 mg   • simvastatin (ZOCOR) 40 mg tablet TAKE 1 TABLET BY MOUTH EVERY DAY IN THE EVENING   • sucralfate (CARAFATE) 1 g tablet TAKE 1 TABLET BY ORAL ROUTE 4 TIMES EVERY DAY ON AN EMPTY STOMACH 1 HOUR BEFORE MEALS AND AT BEDTIME   • valACYclovir (VALTREX) 1,000 mg tablet Take 1 tablet (1,000 mg total) by mouth 3 (three) times a day for 5 days   • [DISCONTINUED] ammonium lactate (LAC-HYDRIN) 12 % cream Apply topically as needed for dry skin (Patient not taking: No sig reported)   • [DISCONTINUED] meclizine (ANTIVERT) 25 mg tablet Take 1 tablet (25 mg total) by mouth every 12 (twelve) hours as needed for dizziness (Patient not taking: Reported on 8/29/2022)       Objective     /62 (BP Location: Right arm, Patient Position: Sitting, Cuff Size: Large)   Pulse 80   Temp 97 5 °F (36 4 °C) (Temporal)   Resp 18   Wt 102 kg (224 lb)   SpO2 98%   BMI 43 75 kg/m²     Physical Exam  HENT:      Head: Normocephalic  Eyes:      Conjunctiva/sclera: Conjunctivae normal    Cardiovascular:      Rate and Rhythm: Normal rate and regular rhythm  Pulmonary:      Effort: Pulmonary effort is normal       Breath sounds: Normal breath sounds  Abdominal:      General: Bowel sounds are normal       Palpations: Abdomen is soft  Skin:     Findings: No rash  Neurological:      Mental Status: She is alert and oriented to person, place, and time        Gait: Gait abnormal        Blaine Alaniz DO

## 2023-06-18 ENCOUNTER — APPOINTMENT (EMERGENCY)
Dept: RADIOLOGY | Facility: HOSPITAL | Age: 83
End: 2023-06-18
Payer: MEDICARE

## 2023-06-18 ENCOUNTER — HOSPITAL ENCOUNTER (EMERGENCY)
Facility: HOSPITAL | Age: 83
Discharge: HOME | End: 2023-06-18
Attending: EMERGENCY MEDICINE | Admitting: EMERGENCY MEDICINE
Payer: MEDICARE

## 2023-06-18 VITALS
SYSTOLIC BLOOD PRESSURE: 190 MMHG | RESPIRATION RATE: 18 BRPM | TEMPERATURE: 97.1 F | OXYGEN SATURATION: 98 % | HEART RATE: 88 BPM | DIASTOLIC BLOOD PRESSURE: 85 MMHG

## 2023-06-18 DIAGNOSIS — M25.561 ACUTE PAIN OF RIGHT KNEE: Primary | ICD-10-CM

## 2023-06-18 PROCEDURE — 73564 X-RAY EXAM KNEE 4 OR MORE: CPT

## 2023-06-18 PROCEDURE — 99284 EMERGENCY DEPT VISIT MOD MDM: CPT

## 2023-06-18 RX ORDER — PREDNISONE 50 MG/1
50 TABLET ORAL DAILY
Qty: 5 TABLET | Refills: 0 | Status: SHIPPED | OUTPATIENT
Start: 2023-06-18

## 2023-06-18 RX ORDER — LIDOCAINE 50 MG/G
1 PATCH TOPICAL DAILY
Qty: 12 PATCH | Refills: 0 | Status: SHIPPED | OUTPATIENT
Start: 2023-06-18

## 2023-06-18 RX ORDER — DEXAMETHASONE 4 MG/1
6 TABLET ORAL ONCE
Status: COMPLETED | OUTPATIENT
Start: 2023-06-18 | End: 2023-06-18

## 2023-06-18 RX ORDER — LIDOCAINE 50 MG/G
1 PATCH TOPICAL ONCE
Status: DISCONTINUED | OUTPATIENT
Start: 2023-06-18 | End: 2023-06-18 | Stop reason: HOSPADM

## 2023-06-18 RX ORDER — TRAMADOL HYDROCHLORIDE 50 MG/1
50 TABLET ORAL EVERY 6 HOURS PRN
Qty: 12 TABLET | Refills: 0 | Status: SHIPPED | OUTPATIENT
Start: 2023-06-18

## 2023-06-18 RX ADMIN — LIDOCAINE 1 PATCH: 50 PATCH CUTANEOUS at 12:43

## 2023-06-18 RX ADMIN — DEXAMETHASONE 6 MG: 4 TABLET ORAL at 12:43

## 2023-06-18 NOTE — ED PROVIDER NOTES
History  Chief Complaint   Patient presents with   • Knee Injury     Pt c/o right knee pain after standing up from the toilet and hearing a crack on Friday      This is the third day after a twisting injury to her right knee  She has some initial swelling but she has been treating it with some ice compression  Pain is minimal now she has been taking Tylenol and ibuprofen  And she is using a cane for assistance with ambulation  There is no gross ecchymosis no deformity  She has no other symptoms or complaints  Past medical history is significant for diabetes hypertension peripheral vascular disease, shingles, coronary disease with bypass and hypercholesterolemia    Patient does not smoke or drink      History provided by:  Patient   used: No        Prior to Admission Medications   Prescriptions Last Dose Informant Patient Reported? Taking?    Accu-Chek FastClix Lancets MISC  Self Yes No   Sig: Use   Aspirin Buf,CaCarb-MgCarb-MgO, 81 MG TABS  Self Yes No   Sig: Take 81 mg by mouth   B Complex-Biotin-FA (HM Vitamin B100 Complex) TABS  Self Yes No   Sig: Take by mouth   B-D ULTRAFINE III SHORT PEN 31G X 8 MM MISC   Yes No   Si (four) times a day   Blood Glucose Monitoring Suppl (Accu-Chek Guide) w/Device KIT  Self Yes No   Sig: Use   Probiotic Product (Align) 4 MG CAPS  Self Yes No   Sig: Take 1 capsule by mouth   Restasis 0 05 % ophthalmic emulsion  Self Yes No   Sig: INSTILL 1 DROP INTO BOTH EYES TWICE A DAY   ascorbic Acid (VITAMIN C) 500 MG CPCR  Self Yes No   Sig: capsule, extended release: 500 mg 1 capsule twice a day by mouth   benazepril (LOTENSIN) 10 mg tablet  Self No No   Sig: TAKE 1 TABLET BY MOUTH EVERY DAY   cilostazol (PLETAL) 100 mg tablet  Self Yes No   Sig: Take 100 mg by mouth 2 (two) times a day   fluticasone (FLONASE) 50 mcg/act nasal spray  Self No No   Si SPRAY INTO EACH NOSTRIL DAILY AT BEDTIME   furosemide (LASIX) 20 mg tablet  Self No No   Sig: TAKE 1 TABLET BY MOUTH TWICE A DAY   Patient taking differently: if needed   glipiZIDE (GLUCOTROL XL) 5 mg 24 hr tablet  Self Yes No   glucose blood (Accu-Chek Guide) test strip  Self Yes No   Sig: TEST 4 TIMES DAILY DX E11 65   glucose blood test strip  Self Yes No   Sig: Use 1 each daily as needed Use as instructed   insulin aspart protamine-insulin aspart (NovoLOG 70/30) 100 units/mL injection  Self Yes No   Sig: Inject 8 Units under the skin 2 (two) times a day before meals Sliding scale >140   insulin degludec [place] FlexTouch) 100 units/mL injection pen  Self Yes No   Sig: Inject 5 Units under the skin   ipratropium (ATROVENT) 0 06 % nasal spray  Self Yes No   lidocaine (XYLOCAINE) 5 % ointment   No No   Sig: Apply topically as needed for mild pain   magnesium hydroxide (MILK OF MAGNESIA) 400 mg/5 mL oral suspension  Self Yes No   Sig: Take by mouth daily as needed   metoprolol tartrate (LOPRESSOR) 25 mg tablet  Self No No   Sig: TAKE 1 TABLET BY MOUTH EVERY DAY   montelukast (SINGULAIR) 10 mg tablet  Self No No   Sig: TAKE 1 TABLET BY MOUTH DAILY AT BEDTIME   nystatin (MYCOSTATIN) cream   No No   Sig: Apply topically 2 (two) times a day   ondansetron (ZOFRAN-ODT) 4 mg disintegrating tablet  Self No No   Sig: TAKE 1 TABLET (4 MG TOTAL) BY MOUTH EVERY 6 (SIX) HOURS AS NEEDED FOR NAUSEA OR VOMITING   polyethylene glycol (GLYCOLAX) 17 GM/SCOOP powder  Self Yes No   Sig: Take 17 g by mouth daily   simethicone (MYLICON) 80 mg chewable tablet  Self Yes No   Sig: Chew 80 mg   simvastatin (ZOCOR) 40 mg tablet  Self No No   Sig: TAKE 1 TABLET BY MOUTH EVERY DAY IN THE EVENING   sucralfate (CARAFATE) 1 g tablet  Self No No   Sig: TAKE 1 TABLET BY ORAL ROUTE 4 TIMES EVERY DAY ON AN EMPTY STOMACH 1 HOUR BEFORE MEALS AND AT BEDTIME   valACYclovir (VALTREX) 1,000 mg tablet   No No   Sig: Take 1 tablet (1,000 mg total) by mouth 3 (three) times a day for 5 days      Facility-Administered Medications: None       Past Medical History: Diagnosis Date   • Diabetes mellitus ([facility] )    • Hyperlipidemia    • Hypertension        Past Surgical History:   Procedure Laterality Date   • CARDIAC SURGERY     • COLONOSCOPY         Family History   Problem Relation Age of Onset   • Heart disease Mother    • Diabetes Mother    • Hypertension Mother    • Heart disease Father    • Hypertension Father    • Diabetes Father      I have reviewed and agree with the history as documented  E-Cigarette/Vaping   • E-Cigarette Use Never User      E-Cigarette/Vaping Substances   • Nicotine No    • THC No    • CBD No    • Flavoring No    • Other No    • Unknown No      Social History     Tobacco Use   • Smoking status: Never   • Smokeless tobacco: Never   Vaping Use   • Vaping Use: Never used   Substance Use Topics   • Alcohol use: Yes     Alcohol/week: 1 0 standard drink of alcohol     Types: 1 Glasses of wine per week     Comment: rarely    • Drug use: Never       Review of Systems   Constitutional: Negative for chills and fever  HENT: Negative for ear pain and sore throat  Eyes: Negative for pain and visual disturbance  Respiratory: Negative for cough and shortness of breath  Cardiovascular: Negative for chest pain and palpitations  Gastrointestinal: Negative for abdominal pain and vomiting  Genitourinary: Negative for dysuria and hematuria  Musculoskeletal: Positive for arthralgias and gait problem  Negative for back pain  Skin: Negative for color change and rash  Neurological: Negative for seizures and syncope  All other systems reviewed and are negative  Physical Exam  Physical Exam  Vitals and nursing note reviewed  Constitutional:       General: She is not in acute distress  Appearance: Normal appearance  She is well-developed  She is obese  HENT:      Head: Normocephalic and atraumatic        Right Ear: External ear normal       Left Ear: External ear normal    Eyes:      Conjunctiva/sclera: Conjunctivae normal       Pupils: Pupils are equal, round, and reactive to light  Cardiovascular:      Rate and Rhythm: Normal rate  Heart sounds: No murmur heard  Pulmonary:      Effort: Pulmonary effort is normal  No respiratory distress  Abdominal:      General: Abdomen is flat  Palpations: Abdomen is soft  Tenderness: There is no abdominal tenderness  Musculoskeletal:         General: No swelling  Cervical back: Neck supple  Right knee: No swelling, deformity, effusion, erythema, ecchymosis, lacerations, bony tenderness or crepitus  Normal range of motion  Tenderness present over the medial joint line  Normal alignment  Skin:     General: Skin is warm and dry  Capillary Refill: Capillary refill takes less than 2 seconds  Neurological:      General: No focal deficit present  Mental Status: She is alert and oriented to person, place, and time  Psychiatric:         Mood and Affect: Mood normal          Thought Content:  Thought content normal          Vital Signs  ED Triage Vitals   Temperature Pulse Respirations Blood Pressure SpO2   06/18/23 1225 06/18/23 1225 06/18/23 1225 06/18/23 1227 06/18/23 1225   (!) 97 1 °F (36 2 °C) 88 18 (!) 190/85 98 %      Temp Source Heart Rate Source Patient Position - Orthostatic VS BP Location FiO2 (%)   06/18/23 1225 06/18/23 1225 -- -- --   Temporal Monitor         Pain Score       --                  Vitals:    06/18/23 1225 06/18/23 1227   BP:  (!) 190/85   Pulse: 88          Visual Acuity      ED Medications  Medications   lidocaine (LIDODERM) 5 % patch 1 patch (1 patch Topical Medication Applied 6/18/23 1243)   dexamethasone (DECADRON) tablet 6 mg (6 mg Oral Given 6/18/23 1243)       Diagnostic Studies  Results Reviewed     None                 XR knee 4+ vw right injury    (Results Pending)              Procedures  Procedures         ED Course                               SBIRT 20yo+    Flowsheet Row Most Recent Value   Initial Alcohol Screen: US AUDIT-C 1  How often do you have a drink containing alcohol? 0 Filed at: 06/18/2023 1227   2  How many drinks containing alcohol do you have on a typical day you are drinking? 0 Filed at: 06/18/2023 1227   3b  FEMALE Any Age, or MALE 65+: How often do you have 4 or more drinks on one occassion? 0 Filed at: 06/18/2023 1227   Audit-C Score 0 Filed at: 06/18/2023 1227   MONALISA: How many times in the past year have you    Used an illegal drug or used a prescription medication for non-medical reasons? Never Filed at: 06/18/2023 1227                    Medical Decision Making  Radiology studies have been independently visualized by me  Preliminary interpretation: no fracture or dislocation / no joint effusion   All radiology studies will be officially read by the radiologist and any discrepancies flagged and follow through the discrepancy management process to make the patient aware of significant results  Acute pain of right knee: acute illness or injury  Amount and/or Complexity of Data Reviewed  Radiology: ordered  Risk  Prescription drug management  Disposition  Final diagnoses:   Acute pain of right knee     Time reflects when diagnosis was documented in both MDM as applicable and the Disposition within this note     Time User Action Codes Description Comment    6/18/2023  1:09 PM Livier Dillon Add [[id number]] Acute pain of right knee       ED Disposition     ED Disposition   Discharge    Condition   Stable    Date/Time   Sun Jun 18, 2023  1:09 PM    [address] discharge to home/self care                 Follow-up Information     Follow up With Specialties Details Why Lawrence 26, DO Family Medicine In 1 week If symptoms worsen [address] [address]  [phone]            Patient's Medications   Discharge Prescriptions    LIDOCAINE (LIDODERM) 5 %    Apply 1 patch topically over 12 hours daily Remove & Discard patch within 12 hours or as directed by MD       Start Date: 6/18/2023 End Date: --       Order Dose: 1 patch       Quantity: 12 patch    Refills: 0    PREDNISONE 50 MG TABLET    Take 1 tablet (50 mg total) by mouth daily       Start Date: 6/18/2023 End Date: --       Order Dose: 50 mg       Quantity: 5 tablet    Refills: 0    TRAMADOL (ULTRAM) 50 MG TABLET    Take 1 tablet (50 mg total) by mouth every 6 (six) hours as needed for moderate pain for up to 12 doses       Start Date: 6/18/2023 End Date: --       Order Dose: 50 mg       Quantity: 12 tablet    Refills: 0       No discharge procedures on file      PDMP Review       Value Time User    PDMP Reviewed  Yes 9/10/2022 11:46 Candy Mata MD          ED Provider  Electronically Signed by           Togn Gimenez MD  06/18/23 6445

## 2023-06-18 NOTE — DISCHARGE INSTRUCTIONS
A  personal message from Dr Harriett Pinon,  Thank you so much for allowing me to care for you today  I pride myself in the care and attention I give all my patients  I hope you were a witness to this tonight  If for any reason your condition does not improve or worsens, or you have a question that was not answered during your visit you can feel free to text me on my personal phone #  # [phone]  I will answer to your message and continue your care past your emergency room visit  Please understand that although you are being discharged because your condition has been deemed stable and able to be managed on an outpatient setting  However your condition may worsen as part of the natural progression of the illness/condition, if this occurs please come back to the emergency department for a repeat evaluation

## 2023-08-16 DIAGNOSIS — J34.89 RHINORRHEA: ICD-10-CM

## 2023-08-17 RX ORDER — MONTELUKAST SODIUM 10 MG/1
10 TABLET ORAL
Qty: 90 TABLET | Refills: 1 | Status: SHIPPED | OUTPATIENT
Start: 2023-08-17

## 2023-08-28 ENCOUNTER — APPOINTMENT (OUTPATIENT)
Age: 83
End: 2023-08-28
Payer: MEDICARE

## 2023-08-28 ENCOUNTER — OFFICE VISIT (OUTPATIENT)
Age: 83
End: 2023-08-28
Payer: MEDICARE

## 2023-08-28 VITALS
BODY MASS INDEX: 43.16 KG/M2 | HEART RATE: 95 BPM | SYSTOLIC BLOOD PRESSURE: 114 MMHG | RESPIRATION RATE: 18 BRPM | WEIGHT: 221 LBS | TEMPERATURE: 97.5 F | DIASTOLIC BLOOD PRESSURE: 60 MMHG | OXYGEN SATURATION: 97 %

## 2023-08-28 DIAGNOSIS — T39.1X1A ACCIDENTAL ACETAMINOPHEN OVERDOSE, INITIAL ENCOUNTER: Primary | ICD-10-CM

## 2023-08-28 DIAGNOSIS — I10 ESSENTIAL HYPERTENSION: ICD-10-CM

## 2023-08-28 DIAGNOSIS — B37.0 CANDIDA, ORAL: ICD-10-CM

## 2023-08-28 DIAGNOSIS — Z79.4 TYPE 2 DIABETES MELLITUS WITHOUT COMPLICATION, WITH LONG-TERM CURRENT USE OF INSULIN (HCC): ICD-10-CM

## 2023-08-28 DIAGNOSIS — T39.1X1A ACCIDENTAL ACETAMINOPHEN OVERDOSE, INITIAL ENCOUNTER: ICD-10-CM

## 2023-08-28 DIAGNOSIS — M15.9 PRIMARY OSTEOARTHRITIS INVOLVING MULTIPLE JOINTS: ICD-10-CM

## 2023-08-28 DIAGNOSIS — R53.1 GENERALIZED WEAKNESS: ICD-10-CM

## 2023-08-28 DIAGNOSIS — I25.810 CORONARY ARTERY DISEASE INVOLVING CORONARY BYPASS GRAFT OF NATIVE HEART WITHOUT ANGINA PECTORIS: ICD-10-CM

## 2023-08-28 DIAGNOSIS — E11.9 TYPE 2 DIABETES MELLITUS WITHOUT COMPLICATION, WITH LONG-TERM CURRENT USE OF INSULIN (HCC): ICD-10-CM

## 2023-08-28 LAB
ANION GAP SERPL CALCULATED.3IONS-SCNC: 9 MMOL/L
APAP SERPL-MCNC: <10 UG/ML (ref 10–20)
BUN SERPL-MCNC: 20 MG/DL (ref 5–25)
CALCIUM SERPL-MCNC: 9.7 MG/DL (ref 8.4–10.2)
CHLORIDE SERPL-SCNC: 104 MMOL/L (ref 96–108)
CO2 SERPL-SCNC: 24 MMOL/L (ref 21–32)
CREAT SERPL-MCNC: 0.65 MG/DL (ref 0.6–1.3)
EST. AVERAGE GLUCOSE BLD GHB EST-MCNC: 169 MG/DL
GFR SERPL CREATININE-BSD FRML MDRD: 84 ML/MIN/1.73SQ M
GLUCOSE SERPL-MCNC: 137 MG/DL (ref 65–140)
HBA1C MFR BLD: 7.5 %
POTASSIUM SERPL-SCNC: 4.2 MMOL/L (ref 3.5–5.3)
SODIUM SERPL-SCNC: 137 MMOL/L (ref 135–147)

## 2023-08-28 PROCEDURE — 80143 DRUG ASSAY ACETAMINOPHEN: CPT

## 2023-08-28 PROCEDURE — 36415 COLL VENOUS BLD VENIPUNCTURE: CPT

## 2023-08-28 PROCEDURE — 83036 HEMOGLOBIN GLYCOSYLATED A1C: CPT

## 2023-08-28 PROCEDURE — 80048 BASIC METABOLIC PNL TOTAL CA: CPT

## 2023-08-28 PROCEDURE — 99214 OFFICE O/P EST MOD 30 MIN: CPT | Performed by: FAMILY MEDICINE

## 2023-08-28 RX ORDER — INSULIN ASPART 100 [IU]/ML
INJECTION, SOLUTION INTRAVENOUS; SUBCUTANEOUS
COMMUNITY
Start: 2023-08-10

## 2023-08-28 RX ORDER — MELOXICAM 15 MG/1
15 TABLET ORAL EVERY MORNING
COMMUNITY
Start: 2023-08-14 | End: 2023-08-28

## 2023-08-28 RX ORDER — NEOMYCIN SULFATE, POLYMYXIN B SULFATE AND DEXAMETHASONE 3.5; 10000; 1 MG/ML; [USP'U]/ML; MG/ML
SUSPENSION/ DROPS OPHTHALMIC
COMMUNITY
Start: 2023-08-17

## 2023-08-28 NOTE — PROGRESS NOTES
Name: Irving Sutherland      : 1940      MRN: [id number]  Encounter Provider: Aliya Winn DO  Encounter Date: 2023   Encounter department: [address]     1. Accidental acetaminophen overdose, initial encounter- no clinical s/s of toxicity. will check level. Pt will stop use of acetaminophen and amoebic. Will use tramadol 50mg which she was prescribed before. -     Acetaminophen level    2. Primary osteoarthritis involving multiple joints- not well controlled. follows with orthopedic. Will provide analgesia mentioned above. encouraged use of tumeric and glucosamine chondroitin as well fo additional joint support. 3. Generalized weakness- persistent. Due to poor mobility and limited endurance will placed referral for PT. 4. Candida, oral-acute. Will provide treatment listed below   -     nystatin (MYCOSTATIN) 500,000 units/5 mL suspension; Apply 5 mL (500,000 Units total) to the mouth or throat 4 (four) times a day           Subjective        Pt present reporting excessive tylenol use. Treating generalized pain with tylenol, amoebic and Lidoderm patches. Past week foot pain and back pain has been really bothersome. Instead of taking 1 500mg tablet of tyenol 4 times a day she has been taking 2 tablets over the past week and a half. Also reports sore throat and some white coating of the tongue. Would like to do PT for her back again. Would like to use [employer]. Review of Systems   HENT: Positive for sore throat. Negative for congestion. Respiratory: Negative for shortness of breath. Cardiovascular: Negative for chest pain. Gastrointestinal: Positive for constipation. Musculoskeletal: Positive for arthralgias, back pain and gait problem.        Current Outpatient Medications on File Prior to Visit   Medication Sig   • Accu-Chek FastClix Lancets MISC Use   • ascorbic Acid (VITAMIN C) 500 MG CPCR capsule, extended release: 500 mg 1 capsule twice a day by mouth   • Aspirin Buf,CaCarb-MgCarb-MgO, 81 MG TABS Take 81 mg by mouth   • B Complex-Biotin-FA (HM Vitamin B100 Complex) TABS Take by mouth   • B-D ULTRAFINE III SHORT PEN 31G X 8 MM MISC 4 (four) times a day   • benazepril (LOTENSIN) 10 mg tablet TAKE 1 TABLET BY MOUTH EVERY DAY   • Blood Glucose Monitoring Suppl (Accu-Chek Guide) w/Device KIT Use   • cilostazol (PLETAL) 100 mg tablet Take 100 mg by mouth 2 (two) times a day   • fluticasone (FLONASE) 50 mcg/act nasal spray 1 SPRAY INTO EACH NOSTRIL DAILY AT BEDTIME   • furosemide (LASIX) 20 mg tablet TAKE 1 TABLET BY MOUTH TWICE A DAY (Patient taking differently: if needed)   • glipiZIDE (GLUCOTROL XL) 5 mg 24 hr tablet    • glucose blood (Accu-Chek Guide) test strip TEST 4 TIMES DAILY DX E11.65   • glucose blood test strip Use 1 each daily as needed Use as instructed   • insulin aspart (NovoLOG FlexPen ReliOn) 100 UNIT/ML injection pen As directed UP TP 75 UNITS PER DAYAs directed UP TP 75 UNITS PER DAY   • insulin aspart protamine-insulin aspart (NovoLOG 70/30) 100 units/mL injection Inject 8 Units under the skin 2 (two) times a day before meals Sliding scale >140   • insulin degludec Quintella Aas FlexTouch) 100 units/mL injection pen Inject 5 Units under the skin   • ipratropium (ATROVENT) 0.06 % nasal spray    • lidocaine (Lidoderm) 5 % Apply 1 patch topically over 12 hours daily Remove & Discard patch within 12 hours or as directed by MD   • lidocaine (XYLOCAINE) 5 % ointment Apply topically as needed for mild pain   • magnesium hydroxide (MILK OF MAGNESIA) 400 mg/5 mL oral suspension Take by mouth daily as needed   • metoprolol tartrate (LOPRESSOR) 25 mg tablet TAKE 1 TABLET BY MOUTH EVERY DAY   • montelukast (SINGULAIR) 10 mg tablet TAKE 1 TABLET BY MOUTH EVERYDAY AT BEDTIME   • nystatin (MYCOSTATIN) cream Apply topically 2 (two) times a day   • ondansetron (ZOFRAN-ODT) 4 mg disintegrating tablet TAKE 1 TABLET (4 MG TOTAL) BY MOUTH EVERY 6 (SIX) HOURS AS NEEDED FOR NAUSEA OR VOMITING   • polyethylene glycol (GLYCOLAX) 17 GM/SCOOP powder Take 17 g by mouth daily   • predniSONE 50 mg tablet Take 1 tablet (50 mg total) by mouth daily   • Probiotic Product (Align) 4 MG CAPS Take 1 capsule by mouth   • Restasis 0.05 % ophthalmic emulsion INSTILL 1 DROP INTO BOTH EYES TWICE A DAY   • simethicone (MYLICON) 80 mg chewable tablet Chew 80 mg   • simvastatin (ZOCOR) 40 mg tablet TAKE 1 TABLET BY MOUTH EVERY DAY IN THE EVENING   • sucralfate (CARAFATE) 1 g tablet TAKE 1 TABLET BY ORAL ROUTE 4 TIMES EVERY DAY ON AN EMPTY STOMACH 1 HOUR BEFORE MEALS AND AT BEDTIME   • traMADol (Ultram) 50 mg tablet Take 1 tablet (50 mg total) by mouth every 6 (six) hours as needed for moderate pain for up to 12 doses   • neomycin-polymyxin-dexamethasone (MAXITROL) ophthalmic suspension INSTILL 1 DROP INTO BOTH EYES 4 TIMES A DAY FOR 5 DAYS   • Polyvinyl Alcohol-Povidone PF 1.4-0.6 % SOLN Apply to eye   • valACYclovir (VALTREX) 1,000 mg tablet Take 1 tablet (1,000 mg total) by mouth 3 (three) times a day for 5 days (Patient not taking: Reported on 8/28/2023)   • [DISCONTINUED] ammonium lactate (LAC-HYDRIN) 12 % cream Apply topically as needed for dry skin (Patient not taking: No sig reported)   • [DISCONTINUED] meclizine (ANTIVERT) 25 mg tablet Take 1 tablet (25 mg total) by mouth every 12 (twelve) hours as needed for dizziness (Patient not taking: Reported on 8/29/2022)   • [DISCONTINUED] meloxicam (MOBIC) 15 mg tablet Take 15 mg by mouth every morning       Objective     /60 (BP Location: Left arm, Patient Position: Sitting, Cuff Size: Standard)   Pulse 95   Temp 97.5 °F (36.4 °C) (Temporal)   Resp 18   Wt 100 kg (221 lb)   SpO2 97%   BMI 43.16 kg/m²     Physical Exam  HENT:      Head: Normocephalic. Right Ear: External ear normal.      Left Ear: External ear normal.      Mouth/Throat:      Mouth: Mucous membranes are dry.       Tongue: Lesions (confluent white patches ) present. Eyes:      General: No scleral icterus. Extraocular Movements: Extraocular movements intact. Conjunctiva/sclera:      Right eye: No exudate. Left eye: No exudate. Cardiovascular:      Rate and Rhythm: Normal rate. Pulmonary:      Effort: Pulmonary effort is normal.   Skin:     Capillary Refill: Capillary refill takes less than 2 seconds. Neurological:      Mental Status: She is alert and oriented to person, place, and time.       Gait: Gait abnormal.   Psychiatric:         Mood and Affect: Mood normal.       Faustina Barfield, DO

## 2023-08-28 NOTE — PATIENT INSTRUCTIONS
Get blood work completed. Use the nystatin solution 4x for the candidida infection. If the blood work is ok I will send the tramadol.

## 2023-08-29 ENCOUNTER — TELEPHONE (OUTPATIENT)
Age: 83
End: 2023-08-29

## 2023-08-29 DIAGNOSIS — M25.561 ACUTE PAIN OF RIGHT KNEE: ICD-10-CM

## 2023-08-29 RX ORDER — TRAMADOL HYDROCHLORIDE 50 MG/1
50 TABLET ORAL EVERY 6 HOURS PRN
Qty: 12 TABLET | Refills: 0 | Status: SHIPPED | OUTPATIENT
Start: 2023-08-29

## 2023-08-29 NOTE — TELEPHONE ENCOUNTER
Pt was here yesterday 8/28/2023 to see Aicha Heath. Pt wants a call back in regards to her lab for overdose of acetaminophen    Also they discussed RX for Tramadol. I do not see this was done.      Call pt [phone]

## 2023-08-29 NOTE — TELEPHONE ENCOUNTER
----- Message from Mateo Mccarthy DO sent at 8/29/2023 12:44 PM EDT -----  Notify patient that her A1c has improved from 7.8-7.5.   kidney and liver function are both normal. She does not have a high Tylenol level her bloodstream.  I will prescribe tramadol today.

## 2023-08-30 ENCOUNTER — TELEPHONE (OUTPATIENT)
Age: 83
End: 2023-08-30

## 2023-08-30 NOTE — TELEPHONE ENCOUNTER
Dr Sherri Espana put her on Tramadol- saw the dr on 8/28    Took Tramadol yesterday at 4PM, 10:15PM, then 5:15 this AM    Was taking Tramadol for pain- she thinks she should stop taking it- there's a change in her breathing; she feels extremely sleepy and this AM can't wake up. Wants to go back on Tylenol; how much should she take?

## 2023-10-19 ENCOUNTER — OFFICE VISIT (OUTPATIENT)
Age: 83
End: 2023-10-19
Payer: MEDICARE

## 2023-10-19 VITALS
RESPIRATION RATE: 18 BRPM | WEIGHT: 224.8 LBS | DIASTOLIC BLOOD PRESSURE: 56 MMHG | SYSTOLIC BLOOD PRESSURE: 104 MMHG | TEMPERATURE: 97.5 F | BODY MASS INDEX: 43.9 KG/M2 | HEART RATE: 68 BPM

## 2023-10-19 DIAGNOSIS — R22.2 MASS OF BUTTOCK: Primary | ICD-10-CM

## 2023-10-19 PROCEDURE — 99213 OFFICE O/P EST LOW 20 MIN: CPT | Performed by: FAMILY MEDICINE

## 2023-10-19 NOTE — PROGRESS NOTES
Name: Christy Newell      : 1940      MRN: [id number]  Encounter Provider: Martha Hinson DO  Encounter Date: 10/19/2023   Encounter department: [address]     1. 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 Low suspicion for abscess or hematoma. Will further evaluate with imaging.  -     US superficial lump (non extremity); Future; Expected date: 10/19/2023        Depression Screening and Follow-up Plan: Patient was screened for depression during today's encounter. They screened negative with a PHQ-2 score of 0. Subjective      Nodule on the left upper buttock. Noticed a few days. More swollen in the morning. Gets smaller withj heating pad. She aslo has a gluteal sore that she has eeen treating without signfiant imporovment.    Current has [address] coming in the home for PT             Current Outpatient Medications on File Prior to Visit   Medication Sig    Accu-Chek FastClix Lancets MISC Use    ascorbic Acid (VITAMIN C) 500 MG CPCR capsule, extended release: 500 mg 1 capsule twice a day by mouth    Aspirin Buf,CaCarb-MgCarb-MgO, 81 MG TABS Take 81 mg by mouth    B Complex-Biotin-FA (HM Vitamin B100 Complex) TABS Take by mouth    B-D ULTRAFINE III SHORT PEN 31G X 8 MM MISC 4 (four) times a day    benazepril (LOTENSIN) 10 mg tablet TAKE 1 TABLET BY MOUTH EVERY DAY    Blood Glucose Monitoring Suppl (Accu-Chek Guide) w/Device KIT Use    cilostazol (PLETAL) 100 mg tablet Take 100 mg by mouth 2 (two) times a day    fluticasone (FLONASE) 50 mcg/act nasal spray 1 SPRAY INTO EACH NOSTRIL DAILY AT BEDTIME    glipiZIDE (GLUCOTROL XL) 5 mg 24 hr tablet     glucose blood (Accu-Chek Guide) test strip TEST 4 TIMES DAILY DX E11.65    glucose blood test strip Use 1 each daily as needed Use as instructed    insulin aspart (NovoLOG FlexPen ReliOn) 100 UNIT/ML injection pen As directed UP TP 75 UNITS PER DAYAs directed UP TP 75 UNITS PER DAY    insulin aspart protamine-insulin aspart (NovoLOG 70/30) 100 units/mL injection Inject 8 Units under the skin 2 (two) times a day before meals Sliding scale >140    metoprolol tartrate (LOPRESSOR) 25 mg tablet TAKE 1 TABLET BY MOUTH EVERY DAY (Patient taking differently: 25 mg Twice a day)    montelukast (SINGULAIR) 10 mg tablet TAKE 1 TABLET BY MOUTH EVERYDAY AT BEDTIME    neomycin-polymyxin-dexamethasone (MAXITROL) ophthalmic suspension INSTILL 1 DROP INTO BOTH EYES 4 TIMES A DAY FOR 5 DAYS    nystatin (MYCOSTATIN) 500,000 units/5 mL suspension Apply 5 mL (500,000 Units total) to the mouth or throat 4 (four) times a day    nystatin (MYCOSTATIN) cream Apply topically 2 (two) times a day    ondansetron (ZOFRAN-ODT) 4 mg disintegrating tablet TAKE 1 TABLET (4 MG TOTAL) BY MOUTH EVERY 6 (SIX) HOURS AS NEEDED FOR NAUSEA OR VOMITING    polyethylene glycol (GLYCOLAX) 17 GM/SCOOP powder Take 17 g by mouth daily    Polyvinyl Alcohol-Povidone PF 1.4-0.6 % SOLN Apply to eye    predniSONE 50 mg tablet Take 1 tablet (50 mg total) by mouth daily    Probiotic Product (Align) 4 MG CAPS Take 1 capsule by mouth    Restasis 0.05 % ophthalmic emulsion INSTILL 1 DROP INTO BOTH EYES TWICE A DAY    simethicone (MYLICON) 80 mg chewable tablet Chew 80 mg    simvastatin (ZOCOR) 40 mg tablet TAKE 1 TABLET BY MOUTH EVERY DAY IN THE EVENING    sucralfate (CARAFATE) 1 g tablet TAKE 1 TABLET BY ORAL ROUTE 4 TIMES EVERY DAY ON AN EMPTY STOMACH 1 HOUR BEFORE MEALS AND AT BEDTIME    furosemide (LASIX) 20 mg tablet TAKE 1 TABLET BY MOUTH TWICE A DAY (Patient not taking: Reported on 10/19/2023)    insulin degludec [place] FlexTouch) 100 units/mL injection pen Inject 5 Units under the skin    ipratropium (ATROVENT) 0.06 % nasal spray     lidocaine (Lidoderm) 5 % Apply 1 patch topically over 12 hours daily Remove & Discard patch within 12 hours or as directed by MD    lidocaine (XYLOCAINE) 5 % ointment Apply topically as needed for mild pain    magnesium hydroxide (MILK OF MAGNESIA) 400 mg/5 mL oral suspension Take by mouth daily as needed    traMADol (Ultram) 50 mg tablet Take 1 tablet (50 mg total) by mouth every 6 (six) hours as needed for moderate pain for up to 12 doses (Patient not taking: Reported on 10/19/2023)    valACYclovir (VALTREX) 1,000 mg tablet Take 1 tablet (1,000 mg total) by mouth 3 (three) times a day for 5 days (Patient not taking: Reported on 8/28/2023)    [DISCONTINUED] ammonium lactate (LAC-HYDRIN) 12 % cream Apply topically as needed for dry skin (Patient not taking: No sig reported)    [DISCONTINUED] meclizine (ANTIVERT) 25 mg tablet Take 1 tablet (25 mg total) by mouth every 12 (twelve) hours as needed for dizziness (Patient not taking: Reported on 8/29/2022)       Objective     /56 (BP Location: Left arm, Patient Position: Sitting, Cuff Size: Standard)   Pulse 68   Temp 97.5 °F (36.4 °C) (Temporal)   Resp 18   Wt 102 kg (224 lb 12.8 oz)   BMI 43.90 kg/m²     Physical Exam  HENT:      Head: Normocephalic. Eyes:      Conjunctiva/sclera: Conjunctivae normal.   Cardiovascular:      Rate and Rhythm: Normal rate. Pulmonary:      Effort: Pulmonary effort is normal.   Genitourinary:     Comments: Soft compressible subcutaneous nodule of the left upper buttock . No erythema or warmth. Nontender. Neurological:      Mental Status: She is alert and oriented to person, place, and time.       Gait: Gait abnormal.           Nicholas Peña DO

## 2023-11-02 ENCOUNTER — HOSPITAL ENCOUNTER (OUTPATIENT)
Dept: ULTRASOUND IMAGING | Facility: HOSPITAL | Age: 83
End: 2023-11-02
Payer: MEDICARE

## 2023-11-02 DIAGNOSIS — N28.1 RENAL CYST: ICD-10-CM

## 2023-11-02 DIAGNOSIS — R22.2 MASS OF BUTTOCK: ICD-10-CM

## 2023-11-02 PROCEDURE — 76775 US EXAM ABDO BACK WALL LIM: CPT

## 2023-11-02 PROCEDURE — 76705 ECHO EXAM OF ABDOMEN: CPT

## 2023-11-13 ENCOUNTER — TELEPHONE (OUTPATIENT)
Age: 83
End: 2023-11-13

## 2023-11-13 NOTE — TELEPHONE ENCOUNTER
----- Message from Shannan Pratt DO sent at 11/11/2023  1:27 PM EST -----  The ultrasound should lump of concern is a collection of fatty tissue. No further evaluation is needed.

## 2023-11-13 NOTE — TELEPHONE ENCOUNTER
Patient has multiple concerns  1) butt still hurts understands its fatty tissue but she thinks there is still something wrong. It hurts in the middle and once in ahile it radiates but not all the time but it hurts constantly. 2) concerned about renal cysts on US but told her they were benign. 3) needs a different referral to home health for the wound care on her butt crack as [facility] does not do this. 4) do you need her to do any additional blood work as she is going tomorrow fasting for the BW the neurologists wants . ..  She did not know what they ordered

## 2023-11-15 NOTE — TELEPHONE ENCOUNTER
Renal cysts are small and simple so they are benign. There is no need to do any further  intervention. Fatty tissue can be  tender if it is constantly irritated or if there is constant pressure is applied. Other than surgical intervention there are not any other forms of intervention that can be done and that would be up to the discretion of the general surgeon. If she would like for me to place a referral today I will. no additional blood work  is needed. I will place a new wound care referral today.

## 2023-11-27 ENCOUNTER — TELEPHONE (OUTPATIENT)
Age: 83
End: 2023-11-27

## 2023-12-01 DIAGNOSIS — M17.0 PRIMARY OSTEOARTHRITIS OF BOTH KNEES: Primary | ICD-10-CM

## 2023-12-01 NOTE — TELEPHONE ENCOUNTER
Patient called back asking about a referral for [facility] for her back. Could someone please let her know when it is in.    Also should she have a PNEUMONIA   SHOT?

## 2023-12-02 DIAGNOSIS — I10 ESSENTIAL HYPERTENSION: ICD-10-CM

## 2023-12-04 RX ORDER — BENAZEPRIL HYDROCHLORIDE 10 MG/1
TABLET ORAL
Qty: 90 TABLET | Refills: 1 | Status: SHIPPED | OUTPATIENT
Start: 2023-12-04

## 2023-12-05 ENCOUNTER — RA CDI HCC (OUTPATIENT)
Dept: OTHER | Facility: HOSPITAL | Age: 83
End: 2023-12-05

## 2023-12-05 NOTE — PROGRESS NOTES
E11.65  [address] [facility] coding opportunities          Chart Reviewed number of suggestions sent to Provider: 1     Patients Insurance     Medicare Insurance: [employer]

## 2023-12-12 ENCOUNTER — OFFICE VISIT (OUTPATIENT)
Age: 83
End: 2023-12-12
Payer: MEDICARE

## 2023-12-12 ENCOUNTER — TELEPHONE (OUTPATIENT)
Dept: ADMINISTRATIVE | Facility: OTHER | Age: 83
End: 2023-12-12

## 2023-12-12 VITALS
WEIGHT: 226.6 LBS | OXYGEN SATURATION: 92 % | BODY MASS INDEX: 44.25 KG/M2 | RESPIRATION RATE: 18 BRPM | TEMPERATURE: 97.5 F | DIASTOLIC BLOOD PRESSURE: 58 MMHG | SYSTOLIC BLOOD PRESSURE: 120 MMHG | HEART RATE: 78 BPM

## 2023-12-12 DIAGNOSIS — M54.50 CHRONIC BILATERAL LOW BACK PAIN WITHOUT SCIATICA: ICD-10-CM

## 2023-12-12 DIAGNOSIS — R39.81 FUNCTIONAL URINARY INCONTINENCE: ICD-10-CM

## 2023-12-12 DIAGNOSIS — G89.29 CHRONIC BILATERAL LOW BACK PAIN WITHOUT SCIATICA: ICD-10-CM

## 2023-12-12 DIAGNOSIS — I10 ESSENTIAL HYPERTENSION: ICD-10-CM

## 2023-12-12 DIAGNOSIS — Z00.00 ENCOUNTER FOR SUBSEQUENT ANNUAL WELLNESS VISIT (AWV) IN MEDICARE PATIENT: Primary | ICD-10-CM

## 2023-12-12 DIAGNOSIS — S31.819D WOUND OF GLUTEAL CLEFT, RIGHT, SUBSEQUENT ENCOUNTER: ICD-10-CM

## 2023-12-12 DIAGNOSIS — L98.9 SCALP LESION: ICD-10-CM

## 2023-12-12 PROCEDURE — G0439 PPPS, SUBSEQ VISIT: HCPCS | Performed by: FAMILY MEDICINE

## 2023-12-12 PROCEDURE — 99214 OFFICE O/P EST MOD 30 MIN: CPT | Performed by: FAMILY MEDICINE

## 2023-12-12 NOTE — TELEPHONE ENCOUNTER
----- Message from Kailyn Cain sent at 12/12/2023  9:54 AM EST -----  Regarding: foot exam  12/12/23 9:54 AM    Hello, our patient Britni Curran has had Diabetic Foot Exam completed/performed. Please assist in updating the patient chart by pulling a previous Electronic Medical Record (EMR) document. [facility] doctor ambrosio nguyen [address] [place] [address] dr nicole christine The date of service is 1 week ago in     Thank you,  Kailyn Cain   PR[facility] [place]

## 2023-12-12 NOTE — PATIENT INSTRUCTIONS
Urinary Incontinence   AMBULATORY CARE:   Urinary incontinence (UI)  is loss of bladder control. UI develops because your bladder cannot store or empty urine properly. The 3 most common types of UI are stress incontinence, urge incontinence, or both. Common symptoms include the following: You feel like your bladder does not empty completely when you urinate. You urinate often and need to urinate immediately. You leak urine when you sleep, or you wake up with the urge to urinate. You leak urine when you cough, sneeze, exercise, or laugh. Seek care immediately if:   You have severe pain. You are confused or cannot think clearly. You see blood in your urine. You have pain when you urinate. You have new or worse pain, even after treatment. Call your doctor if:   You have a fever. Your mouth feels dry or you have vision changes. Your urine is cloudy or smells bad. You have questions or concerns about your condition or care. Treatment  may include any of the following:  Medicines  may be given to help strengthen your bladder control. Electrical stimulation  is used to send a small amount of electrical energy to your pelvic floor muscles. This helps control your bladder function. Electrodes may be placed outside your body or in your rectum. For women, the electrodes may be placed in the vagina. A bulking agent  may be injected into the wall of your urethra to make it thicker. This helps keep your urethra closed and decreases urine leakage. Devices  such as a clamp, pessary, or tampon may help stop urine leaks. Ask your healthcare provider for more information about these and other devices. Surgery  may be needed if other treatments do not work. Several types of surgery can help improve your bladder control. Ask your provider for more information about the surgery you may need. Self-care:   Keep a UI record.   Write down how often you leak urine and how much you leak. Make a note of what you were doing when you leaked urine. Drink liquids as directed. Ask your healthcare provider how much liquid to drink each day and which liquids are best for you. You may need to limit the amount of liquid you drink to help control your urine leakage. Do not drink any liquid right before you go to bed. Limit or do not have drinks that contain caffeine or alcohol. Prevent constipation. Eat a variety of high-fiber foods. Good examples are high-fiber cereals, beans, vegetables, and whole-grain breads. Prune juice may help make your bowel movement softer. Walking is the best way to trigger your intestines to have a bowel movement. Exercise regularly and maintain a healthy weight. Ask your provider how much you should weigh and about the best exercise plan for you. Weight loss and exercise will decrease pressure on your bladder and help you control your leakage. Ask your provider to help you create a weight loss plan, if needed. Use a catheter as directed  to help empty your bladder. A catheter is a tiny, plastic tube that is put into your bladder to drain your urine. Your provider may tell you to use a catheter to prevent your bladder from getting too full and leaking urine. Go to behavior therapy as directed. Behavior therapy may be used to help you learn to control your urge to urinate. Follow up with your doctor as directed:  Write down your questions so you remember to ask them during your visits. © Copyright Loletta Gosselin 2023 Information is for End User's use only and may not be sold, redistributed or otherwise used for commercial purposes. The above information is an  only. It is not intended as medical advice for individual conditions or treatments. Talk to your doctor, nurse or pharmacist before following any medical regimen to see if it is safe and effective for you.

## 2023-12-12 NOTE — TELEPHONE ENCOUNTER
----- Message from Divya Carrillo sent at 12/11/2023  4:17 PM EST -----  Regarding: DXA Scan  12/11/23 4:18 PM    Hello, our patient Britni Curran has had DEXA Scan completed/performed. Please assist in updating the patient chart by pulling a previous Electronic Medical Record (EMR) document. The previous EMR is [facility]. The date of service is 11/2/2023.    Thank you,  Divya Carrillo   PRIMARY CARE [place]

## 2023-12-12 NOTE — TELEPHONE ENCOUNTER
----- Message from Kailyn Cain sent at 12/12/2023 10:26 AM EST -----  Regarding: eye exam  ....12/12/23 10:26 AM    Hello, our patient Britni Curran has had Diabetic Foot Exam completed/performed. Please assist in updating the patient chart by pulling a previous Electronic Medical Record (EMR) document. The previous EMR is [facility] selvin rowland, joyce [facility] The date of service is started since 12/20222    Thank you  Kailyn Cain   PRIMARY CARE [place]

## 2023-12-12 NOTE — PROGRESS NOTES
Assessment and Plan:     Problem List Items Addressed This Visit       Essential hypertension-BP normal.  Recently seen by cardiology and amlodipine was added to her regimen. BP normal on amlodipine 5 mg daily, benazepril 10 mg daily, Lasix 20 mg daily and metoprolol 25 mg twice daily     Other Visit Diagnoses       Encounter for subsequent annual wellness visit (AWV) in Medicare patient    -  Primary    Functional urinary incontinence        Chronic bilateral low back pain without sciatica        Relevant Orders    EXTERNAL Referral to [address] [place] [address] of gluteal cleft, right, subsequent encounter    -stage I pressure ulcer. Unable to get home health wound care to come into the home due to limited availability in the area. Patient encouraged to use attends [facility] outpatient clinic. Patient amendable. Relevant Orders    Ambulatory Referral to Wound Care    Scalp lesion    -poorly healing wound. Concern for SCC. Patient encouraged to stay away from medication improves and apply Vaseline on the area once a day until she can be evaluated by dermatology. ASAP referral placed    Relevant Orders    Ambulatory Referral to Dermatology            Depression Screening and Follow-up Plan: Patient was screened for depression during today's encounter. They screened negative with a PHQ-2 score of 0. Preventive health issues were discussed with patient, and age appropriate screening tests were ordered as noted in patient's After Visit Summary. Personalized health advice and appropriate referrals for health education or preventive services given if needed, as noted in patient's After Visit Summary. History of Present Illness:     Patient presents for a Medicare Wellness Visit    Requesting referral for physical therapy. [facility] Already working with them for her osteoporosis of the knees.   Reports that home health has not called to evaluate for the gluteal wound.  She continues to self manage but it has not completely healed. Reports lesion at the scalp. Has been using medicated shampoo but it has not gone away       Patient Care Team:  Mandy Albright DO as PCP - General (Family Medicine)         Problem List:     Patient Active Problem List   Diagnosis    PVD (peripheral vascular disease) ([address])    Body mass index (BMI) of 40.0 to 44.9 in adult [facility])    Coronary artery disease involving coronary bypass graft of native heart without angina pectoris    Type 2 diabetes mellitus without complication, with long-term current use of insulin ([address])    Essential hypertension    Chronic constipation    Post-nasal drip    Generalized weakness    Morbid obesity (HCC)    Hx of CABG    Sleep apnea    Diverticulosis      Past Medical and Surgical History:     Past Medical History:   Diagnosis Date    Diabetes mellitus ([address])     Hyperlipidemia     Hypertension      Past Surgical History:   Procedure Laterality Date    CARDIAC SURGERY      COLONOSCOPY        Family History:     Family History   Problem Relation Age of Onset    Heart disease Mother     Diabetes Mother     Hypertension Mother     Heart disease Father     Hypertension Father     Diabetes Father       Social History:     Social History     Socioeconomic History    Marital status: Single     Spouse name: None    Number of children: None    Years of education: None    Highest education level: None   Occupational History    None   Tobacco Use    Smoking status: Never    Smokeless tobacco: Never   Vaping Use    Vaping Use: Never used   Substance and Sexual Activity    Alcohol use:  Yes     Alcohol/week: 1.0 standard drink of alcohol     Types: 1 Glasses of wine per week     Comment: rarely     Drug use: Never    Sexual activity: None   Other Topics Concern    None   Social History Narrative    None     Social Determinants of Health     Financial Resource Strain: Low Risk  (12/12/2023)    Overall Financial Resource Strain (CARDIA)     Difficulty of Paying Living Expenses: Not very hard   Food Insecurity: No Food Insecurity (9/9/2022)    Hunger Vital Sign     Worried About Running Out of Food in the Last Year: Never true     Ran Out of Food in the Last Year: Never true   Transportation Needs: No Transportation Needs (12/12/2023)    PRAPARE - Transportation     Lack of Transportation (Medical): No     Lack of Transportation (Non-Medical):  No   Physical Activity: Inactive (5/14/2021)    Exercise Vital Sign     Days of Exercise per Week: 0 days     Minutes of Exercise per Session: 0 min   Stress: No Stress Concern Present (5/14/2021)    [facility]     Feeling of Stress : Not at all   Social Connections: Not on file   Intimate Partner Violence: Not on file   Housing Stability: [address]  (9/9/2022)    Housing Stability Vital Sign     Unable to Pay for Housing in the Last Year: No     Number of [address] in the Last Year: 1     Unstable Housing in the Last Year: No      Medications and Allergies:     Current Outpatient Medications   Medication Sig Dispense Refill    Accu-Chek FastClix Lancets MISC Use      ascorbic Acid (VITAMIN C) 500 MG CPCR capsule, extended release: 500 mg 1 capsule twice a day by mouth      B Complex-Biotin-FA (HM Vitamin B100 Complex) TABS Take by mouth      B-D ULTRAFINE III SHORT PEN 31G X 8 MM MISC 4 (four) times a day      benazepril (LOTENSIN) 10 mg tablet TAKE 1 TABLET BY MOUTH EVERY DAY 90 tablet 1    Blood Glucose Monitoring Suppl (Accu-Chek Guide) w/Device KIT Use      cilostazol (PLETAL) 100 mg tablet Take 100 mg by mouth 2 (two) times a day      fluticasone (FLONASE) 50 mcg/act nasal spray 1 SPRAY INTO EACH NOSTRIL DAILY AT BEDTIME 16 mL 1    furosemide (LASIX) 20 mg tablet TAKE 1 TABLET BY MOUTH TWICE A  tablet 0    glipiZIDE (GLUCOTROL XL) 5 mg 24 hr tablet       glucose blood (Accu-Chek Guide) test strip TEST 4 TIMES DAILY DX E11.65      glucose blood test strip Use 1 each daily as needed Use as instructed      insulin aspart (NovoLOG FlexPen ReliOn) 100 UNIT/ML injection pen As directed UP TP 75 UNITS PER DAYAs directed UP TP 75 UNITS PER DAY      insulin aspart protamine-insulin aspart (NovoLOG 70/30) 100 units/mL injection Inject 8 Units under the skin 2 (two) times a day before meals Sliding scale >140      insulin degludec Fanny Fell FlexTouch) 100 units/mL injection pen Inject 5 Units under the skin      ipratropium (ATROVENT) 0.06 % nasal spray       magnesium hydroxide (MILK OF MAGNESIA) 400 mg/5 mL oral suspension Take by mouth daily as needed      metoprolol tartrate (LOPRESSOR) 25 mg tablet TAKE 1 TABLET BY MOUTH EVERY DAY (Patient taking differently: 25 mg Twice a day) 90 tablet 5    montelukast (SINGULAIR) 10 mg tablet TAKE 1 TABLET BY MOUTH EVERYDAY AT BEDTIME 90 tablet 1    nystatin (MYCOSTATIN) cream Apply topically 2 (two) times a day 30 g 0    ondansetron (ZOFRAN-ODT) 4 mg disintegrating tablet TAKE 1 TABLET (4 MG TOTAL) BY MOUTH EVERY 6 (SIX) HOURS AS NEEDED FOR NAUSEA OR VOMITING 20 tablet 0    polyethylene glycol (GLYCOLAX) 17 GM/SCOOP powder Take 17 g by mouth daily      Polyvinyl Alcohol-Povidone PF 1.4-0.6 % SOLN Apply to eye      Probiotic Product (Align) 4 MG CAPS Take 1 capsule by mouth      Restasis 0.05 % ophthalmic emulsion INSTILL 1 DROP INTO BOTH EYES TWICE A DAY      simethicone (MYLICON) 80 mg chewable tablet Chew 80 mg      sucralfate (CARAFATE) 1 g tablet TAKE 1 TABLET BY ORAL ROUTE 4 TIMES EVERY DAY ON AN EMPTY STOMACH 1 HOUR BEFORE MEALS AND AT BEDTIME 360 tablet 5    Aspirin Buf,CaCarb-MgCarb-MgO, 81 MG TABS Take 81 mg by mouth      lidocaine (Lidoderm) 5 % Apply 1 patch topically over 12 hours daily Remove & Discard patch within 12 hours or as directed by MD Interiano patch 0    neomycin-polymyxin-dexamethasone (MAXITROL) ophthalmic suspension INSTILL 1 DROP INTO BOTH EYES 4 TIMES A DAY FOR 5 DAYS nystatin (MYCOSTATIN) 500,000 units/5 mL suspension Apply 5 mL (500,000 Units total) to the mouth or throat 4 (four) times a day 60 mL 0    predniSONE 50 mg tablet Take 1 tablet (50 mg total) by mouth daily 5 tablet 0    simvastatin (ZOCOR) 40 mg tablet TAKE 1 TABLET BY MOUTH EVERY DAY IN THE EVENING (Patient not taking: Reported on 12/12/2023) 90 tablet 2    traMADol (Ultram) 50 mg tablet Take 1 tablet (50 mg total) by mouth every 6 (six) hours as needed for moderate pain for up to 12 doses (Patient not taking: Reported on 10/19/2023) 12 tablet 0    valACYclovir (VALTREX) 1,000 mg tablet Take 1 tablet (1,000 mg total) by mouth 3 (three) times a day for 5 days (Patient not taking: Reported on 8/28/2023) 15 tablet 0     No current facility-administered medications for this visit. Allergies   Allergen Reactions    Keflex [Cephalexin] Hives    Penicillins Rash    Atorvastatin Other (See Comments)     Other reaction(s): Muscle pain  Other reaction(s): Muscle pain      Metformin Diarrhea     Severe diarrhea  Severe diarrhea        Immunizations:     Immunization History   Administered Date(s) Administered    COVID-19 MODERNA VACC 0.5 ML IM 01/12/2021, 02/10/2021, 10/28/2021, 04/12/2022, 11/18/2022    INFLUENZA 11/07/2016, 09/24/2021, 10/25/2022    Influenza Split High Dose Preservative Free IM 09/15/2020    Influenza Whole 11/01/2019, 09/15/2020    Influenza, high dose seasonal 0.7 mL 09/24/2021    Influenza, seasonal, injectable 11/07/2016    Pneumococcal Conjugate Vaccine 20-valent (Pcv20), Polysace 12/12/2022      Health Maintenance:         Topic Date Due    Hepatitis C Screening  Completed         Topic Date Due    COVID-19 Vaccine (6 - Moderna series) 01/13/2023    Influenza Vaccine (1) 09/01/2023      Medicare Screening Tests and Risk Assessments: Og Almazan is here for her Subsequent Wellness visit. Health Risk Assessment:   Patient rates overall health as fair.  Patient feels that their physical health rating is slightly worse. Patient is satisfied with their life. Eyesight was rated as slightly worse. Hearing was rated as same. Patient feels that their emotional and mental health rating is much better. Patients states they are never, rarely angry. Patient states they are sometimes unusually tired/fatigued. Pain experienced in the last 7 days has been some. Patient's pain rating has been 4/10. Patient states that she has experienced no weight loss or gain in last 6 months. Depression Screening:   PHQ-2 Score: 0      Fall Risk Screening: In the past year, patient has experienced: no history of falling in past year      Urinary Incontinence Screening:   Patient has leaked urine accidently in the last six months. worse    Home Safety:  Patient has trouble with stairs inside or outside of their home. Patient has working smoke alarms and has working carbon monoxide detector. Home safety hazards include: none. Nutrition:   Current diet is Regular. Moms meal are deliver to home    Medications:   Patient is not currently taking any over-the-counter supplements. Patient is able to manage medications. Activities of Daily Living (ADLs)/Instrumental Activities of Daily Living (IADLs):   Walk and transfer into and out of bed and chair?: Yes  Dress and groom yourself?: Yes    Bathe or shower yourself?: Yes    Feed yourself?  Yes  Do your laundry/housekeeping?: Yes  Manage your money, pay your bills and track your expenses?: Yes  Make your own meals?: No    Do your own shopping?: Yes    Previous Hospitalizations:   Any hospitalizations or ED visits within the last 12 months?: Yes      Hospitalization Comments: [facility] for her knee    Advance Care Planning:   Living will: Yes    Advanced directive: Yes      PREVENTIVE SCREENINGS      Cardiovascular Screening:    General: Screening Current      Diabetes Screening:     General: Screening Not Indicated and History Diabetes      Cervical Cancer Screening: General: Screening Not Indicated      Lung Cancer Screening:     General: Screening Not Indicated      Hepatitis C Screening:    General: Screening Current    Screening, Brief Intervention, and Referral to Treatment (SBIRT)    Screening  Typical number of drinks in a day: 0  Typical number of drinks in a week: 0  Interpretation: Low risk drinking behavior. Single Item Drug Screening:  How often have you used an illegal drug (including marijuana) or a prescription medication for non-medical reasons in the past year? never    Single Item Drug Screen Score: 0  Interpretation: Negative screen for possible drug use disorder    No results found. Physical Exam:     /58 (BP Location: Left arm, Patient Position: Sitting, Cuff Size: Large)   Pulse 78   Temp 97.5 °F (36.4 °C) (Temporal)   Resp 18   Wt 103 kg (226 lb 9.6 oz)   SpO2 92%   BMI 44.25 kg/m²     Physical Exam  HENT:      Head:      Comments: Annular lesion on the top left parietal region of the scalp. Very minimal erythema within the base. Scabbed over with hyperpigmented areas within the periphery  Eyes:      Conjunctiva/sclera: Conjunctivae normal.   Cardiovascular:      Rate and Rhythm: Normal rate. Pulmonary:      Effort: Pulmonary effort is normal.   Musculoskeletal:      Right lower leg: No edema. Left lower leg: No edema. Neurological:      Mental Status: She is alert and oriented to person, place, and time.       Gait: Gait abnormal.          Carver Sarbjit, DO

## 2023-12-13 ENCOUNTER — TELEPHONE (OUTPATIENT)
Age: 83
End: 2023-12-13

## 2023-12-13 NOTE — TELEPHONE ENCOUNTER
Patient called to request a NP appt for a non healing lesion on her scalp. Her pcp placed an asap referral and I offered to try to get her a sooner appt in [place] w/ on call dr since dr moseley is booked until April but she declined bc she does not drive that far and only wants to be seen in [facility].   Appt was scheduled in April and she was also placed on cancellation list for sooner appt

## 2023-12-18 NOTE — TELEPHONE ENCOUNTER
Upon review of the In Basket request we were able to locate, review, and update the patient chart as requested for Diabetic Foot Exam.    Any additional questions or concerns should be emailed to the Practice Liaisons via the appropriate education email address, please do not reply via In Basket.    Thank you  Mulugeta Pillai

## 2023-12-19 NOTE — TELEPHONE ENCOUNTER
WANTS  TO  KNOW  IF  SHE  CAN  GET  NEW  SCRIPT   FOR  PHY  THERAPY     FOR  HER   LOWER   BACK   PT   SAID   WE   HAVE  FAX   NUMBER  FOR   [facility]   SHE  SAID  THEY  ARE  DOING  THERAPY  ON  HER  LEGS AND  WE   SENT  OTHER   FAX   TO  THEM   ALSO    WANTS  TO  KNOW  IF  SHE NEEDS   A PNEUMONIA   SHOT      n/a

## 2024-01-19 LAB
LEFT EYE DIABETIC RETINOPATHY: NORMAL
RIGHT EYE DIABETIC RETINOPATHY: NORMAL

## 2024-01-19 RX ORDER — CILOSTAZOL 100 MG/1
100 TABLET ORAL 2 TIMES DAILY
OUTPATIENT
Start: 2024-01-19

## 2024-01-22 ENCOUNTER — TELEPHONE (OUTPATIENT)
Age: 84
End: 2024-01-22

## 2024-01-22 DIAGNOSIS — I73.9 PVD (PERIPHERAL VASCULAR DISEASE) (HCC): Primary | ICD-10-CM

## 2024-01-22 RX ORDER — CILOSTAZOL 100 MG/1
100 TABLET ORAL 2 TIMES DAILY
Qty: 180 TABLET | Refills: 0 | Status: SHIPPED | OUTPATIENT
Start: 2024-01-22 | End: 2024-04-21

## 2024-01-22 NOTE — TELEPHONE ENCOUNTER
From voice mail:    Hi, this is Aisha Jordan. I'm patient of Doctor Miller and I called last week because I need prescription filled and now it's urgent and this used to be filled by Doctor Umair, but he's gone out of practice and I can't get a hold of him. So I would like Doctor Tamir to fill this prescription and the name of the prescription is the list is all CILOSTAZOL. It's 2 tablets a day, one morning and evening at 100 milligrams each. Tablet, please give me a call so that I know that this is getting done because by tomorrow I'll need new medication. Thank you very much. My number is [phone]. Thank you.

## 2024-02-03 DIAGNOSIS — B37.2 CANDIDAL SKIN INFECTION: ICD-10-CM

## 2024-02-03 DIAGNOSIS — I73.9 PVD (PERIPHERAL VASCULAR DISEASE) (HCC): ICD-10-CM

## 2024-02-03 DIAGNOSIS — B37.0 CANDIDA, ORAL: ICD-10-CM

## 2024-02-05 RX ORDER — CILOSTAZOL 100 MG/1
100 TABLET ORAL 2 TIMES DAILY
Qty: 180 TABLET | Refills: 0 | Status: SHIPPED | OUTPATIENT
Start: 2024-02-05

## 2024-02-05 RX ORDER — NYSTATIN 100000 U/G
1 CREAM TOPICAL 2 TIMES DAILY
Qty: 30 G | Refills: 0 | Status: SHIPPED | OUTPATIENT
Start: 2024-02-05

## 2024-02-19 DIAGNOSIS — I10 ESSENTIAL HYPERTENSION: ICD-10-CM

## 2024-02-20 RX ORDER — SUCRALFATE 1 G/1
TABLET ORAL
Qty: 100 TABLET | Refills: 21 | Status: SHIPPED | OUTPATIENT
Start: 2024-02-20

## 2024-03-28 DIAGNOSIS — J34.89 RHINORRHEA: ICD-10-CM

## 2024-03-29 RX ORDER — MONTELUKAST SODIUM 10 MG/1
10 TABLET ORAL
Qty: 90 TABLET | Refills: 1 | Status: SHIPPED | OUTPATIENT
Start: 2024-03-29

## 2024-04-17 ENCOUNTER — OFFICE VISIT (OUTPATIENT)
Age: 84
End: 2024-04-17
Payer: MEDICARE

## 2024-04-17 VITALS — TEMPERATURE: 97.7 F | BODY MASS INDEX: 41.54 KG/M2 | WEIGHT: 220 LBS | HEIGHT: 61 IN

## 2024-04-17 DIAGNOSIS — D22.9 MULTIPLE NEVI: ICD-10-CM

## 2024-04-17 DIAGNOSIS — D18.01 CHERRY ANGIOMA: ICD-10-CM

## 2024-04-17 DIAGNOSIS — L82.1 SEBORRHEIC KERATOSIS: ICD-10-CM

## 2024-04-17 DIAGNOSIS — L98.9 SCALP LESION: ICD-10-CM

## 2024-04-17 DIAGNOSIS — L89.329 PRESSURE INJURY OF SKIN OF LEFT BUTTOCK, UNSPECIFIED INJURY STAGE: Primary | ICD-10-CM

## 2024-04-17 DIAGNOSIS — Z13.89 SCREENING FOR SKIN CONDITION: ICD-10-CM

## 2024-04-17 PROCEDURE — 99204 OFFICE O/P NEW MOD 45 MIN: CPT | Performed by: DERMATOLOGY

## 2024-04-17 RX ORDER — AMLODIPINE BESYLATE 5 MG/1
5 TABLET ORAL DAILY
COMMUNITY
Start: 2024-03-10

## 2024-04-17 RX ORDER — ROSUVASTATIN CALCIUM 10 MG/1
10 TABLET, COATED ORAL DAILY
COMMUNITY
Start: 2024-04-05

## 2024-04-17 RX ORDER — AMMONIUM LACTATE 12 G/100G
CREAM TOPICAL AS NEEDED
Qty: 140 G | Refills: 1 | Status: SHIPPED | OUTPATIENT
Start: 2024-04-17

## 2024-04-17 RX ORDER — LOSARTAN POTASSIUM AND HYDROCHLOROTHIAZIDE 25; 100 MG/1; MG/1
1 TABLET ORAL DAILY
COMMUNITY

## 2024-04-17 NOTE — PROGRESS NOTES
"[facility] Note     Patient Name: Britni Curran  Encounter Date: April 17, 2024     Have you been cared for by a [facility] Dermatologist in the last 3 years and, if so, which description applies to you?    NO.   I am considered a \"new\" patient and must complete all patient intake questions. I am FEMALE/of child-bearing potential.    REVIEW OF SYSTEMS:  Have you recently had or currently have any of the following? Recent fever or chills? No  Any non-healing wound? No  Are you pregnant or planning to become pregnant? No  Are you currently or planning to be nursing or breast feeding? No   PAST MEDICAL HISTORY:  Have you personally ever had or currently have any of the following?  If \"YES,\" then please provide more detail. Skin cancer (such as Melanoma, Basal Cell Carcinoma, Squamous Cell Carcinoma?  No  Tuberculosis, HIV/AIDS, Hepatitis B or C: No  Radiation Treatment No   HISTORY OF IMMUNOSUPPRESSION:   Do you have a history of any of the following:  Systemic Immunosuppression such as Diabetes, Biologic or Immunotherapy, Chemotherapy, Organ Transplantation, Bone Marrow Transplantation?  No    Answering \"YES\" requires the addition of the dotphrase \"IMMUNOSUPPRESSED\" as the first diagnosis of the patient's visit.   FAMILY HISTORY:  Any \"first degree relatives\" (parent, brother, sister, or child) with the following?    Skin Cancer, Pancreatic or Other Cancer? YES, Breast Cancer - Daughter   PATIENT EXPERIENCE:    Do you want the Dermatologist to perform a COMPLETE skin exam today including a clinical examination under the \"bra and underwear\" areas?  Yes  If necessary, do we have your permission to call and leave a detailed message on your Preferred Phone number that includes your specific medical information?  Yes      Allergies   Allergen Reactions    Keflex [Cephalexin] Hives    Penicillins Rash    Atorvastatin Other (See Comments)     Other reaction(s): Muscle pain  Other reaction(s): Muscle " pain      Metformin Diarrhea     Severe diarrhea  Severe diarrhea        Current Outpatient Medications:     Accu-Chek FastClix Lancets MISC, Use, Disp: , Rfl:     amLODIPine (NORVASC) 5 mg tablet, Take 5 mg by mouth daily, Disp: , Rfl:     ascorbic Acid (VITAMIN C) 500 MG CPCR, capsule, extended release: 500 mg 1 capsule twice a day by mouth, Disp: , Rfl:     B Complex-Biotin-FA (HM Vitamin B100 Complex) TABS, Take by mouth, Disp: , Rfl:     B-D ULTRAFINE III SHORT PEN 31G X 8 MM MISC, 4 (four) times a day, Disp: , Rfl:     Blood Glucose Monitoring Suppl (Accu-Chek Guide) w/Device KIT, Use, Disp: , Rfl:     cilostazol (PLETAL) 100 mg tablet, TAKE 1 TABLET BY MOUTH TWICE A DAY, Disp: 180 tablet, Rfl: 0    fluticasone (FLONASE) 50 mcg/act nasal spray, 1 SPRAY INTO EACH NOSTRIL DAILY AT BEDTIME, Disp: 16 mL, Rfl: 1    furosemide (LASIX) 20 mg tablet, TAKE 1 TABLET BY MOUTH TWICE A DAY, Disp: 180 tablet, Rfl: 0    glipiZIDE (GLUCOTROL XL) 5 mg 24 hr tablet, , Disp: , Rfl:     glucose blood (Accu-Chek Guide) test strip, TEST 4 TIMES DAILY DX E11.65, Disp: , Rfl:     glucose blood test strip, Use 1 each daily as needed Use as instructed, Disp: , Rfl:     insulin aspart (NovoLOG FlexPen ReliOn) 100 UNIT/ML injection pen, As directed UP TP 75 UNITS PER DAYAs directed UP TP 75 UNITS PER DAY, Disp: , Rfl:     insulin aspart protamine-insulin aspart (NovoLOG 70/30) 100 units/mL injection, Inject 8 Units under the skin 2 (two) times a day before meals Sliding scale >140, Disp: , Rfl:     insulin degludec (Tresiba FlexTouch) 100 units/mL injection pen, Inject 5 Units under the skin, Disp: , Rfl:     ipratropium (ATROVENT) 0.06 % nasal spray, , Disp: , Rfl:     magnesium hydroxide (MILK OF MAGNESIA) 400 mg/5 mL oral suspension, Take by mouth daily as needed, Disp: , Rfl:     metoprolol tartrate (LOPRESSOR) 25 mg tablet, TAKE 1 TABLET BY MOUTH EVERY DAY (Patient taking differently: 25 mg Twice a day), Disp: 90 tablet, Rfl: 5     "montelukast (SINGULAIR) 10 mg tablet, TAKE 1 TABLET BY MOUTH EVERYDAY AT BEDTIME, Disp: 90 tablet, Rfl: 1    nystatin (MYCOSTATIN) cream, APPLY TO AFFECTED AREA TWICE A DAY, Disp: 30 g, Rfl: 0    ondansetron (ZOFRAN-ODT) 4 mg disintegrating tablet, TAKE 1 TABLET (4 MG TOTAL) BY MOUTH EVERY 6 (SIX) HOURS AS NEEDED FOR NAUSEA OR VOMITING, Disp: 20 tablet, Rfl: 0    polyethylene glycol (GLYCOLAX) 17 GM/SCOOP powder, Take 17 g by mouth daily, Disp: , Rfl:     Polyvinyl Alcohol-Povidone PF 1.4-0.6 % SOLN, Apply to eye, Disp: , Rfl:     Probiotic Product (Align) 4 MG CAPS, Take 1 capsule by mouth, Disp: , Rfl:     Restasis 0.05 % ophthalmic emulsion, INSTILL 1 DROP INTO BOTH EYES TWICE A DAY, Disp: , Rfl:     rosuvastatin (CRESTOR) 10 MG tablet, Take 10 mg by mouth daily, Disp: , Rfl:     simethicone (MYLICON) 80 mg chewable tablet, Chew 80 mg, Disp: , Rfl:     sucralfate (CARAFATE) 1 g tablet, TAKE 1 TABLET BY ORAL ROUTE 4 TIMES EVERY DAY ON AN EMPTY STOMACH 1 HOUR BEFORE MEALS AND AT BEDTIME, Disp: 100 tablet, Rfl: 21    benazepril (LOTENSIN) 10 mg tablet, TAKE 1 TABLET BY MOUTH EVERY DAY (Patient not taking: Reported on 4/17/2024), Disp: 90 tablet, Rfl: 1    losartan-hydrochlorothiazide (HYZAAR) 100-25 MG per tablet, Take 1 tablet by mouth daily, Disp: , Rfl:           Whom besides the patient is providing clinical information about today's encounter?   NO ADDITIONAL HISTORIAN (patient alone provided history)    Physical Exam and Assessment/Plan by Diagnosis:    Chief complaint: Patient is a 82 y/o female present for a baseline skin exam without personal or family history of skin cancer and was referred by PROVIDER for evaluation of pigmented lesions on the scalp.    MELANOCYTIC NEVI (\"Moles\")    Physical Exam:  Anatomic Location Affected: Mostly on sun-exposed areas of the trunk and extremities  Morphological Description:  Scattered, 1-4mm round to ovoid, symmetrical-appearing, even bordered, skin colored to dark " "brown macules/papules, mostly in sun-exposed areas  Pertinent Positives:  Pertinent Negatives:    Additional History of Present Condition:  present on exam    Assessment and Plan:  Based on a thorough discussion of this condition and the management approach to it (including a comprehensive discussion of the known risks, side effects and potential benefits of treatment), the patient (family) agrees to implement the following specific plan:  Provided handout with information regarding the ABCDE's of moles   Recommend routine skin exams every year   Sun avoidance, protective clothing (known as UPF clothing), and the use of at least SPF 30 sunscreens is advised. Sunscreen should be reapplied every two hours when outside.     SEBORRHEIC KERATOSIS; NON-INFLAMED    Physical Exam:  Anatomic Location Affected:  scattered across trunk, extremities,  face  Morphological Description:  Flat and raised, waxy, smooth to warty textured, yellow to brownish-grey to dark brown to blackish, discrete, \"stuck-on\" appearing papules.  Pertinent Positives:  Pertinent Negatives:    Additional History of Present Condition:  Patient reports new bumps on the skin.  Denies itch, burn, pain, bleeding or ulceration.  Present constantly; nothing seems to make it worse or better.  No prior treatment.      Assessment and Plan:  Based on a thorough discussion of this condition and the management approach to it (including a comprehensive discussion of the known risks, side effects and potential benefits of treatment), the patient (family) agrees to implement the following specific plan:  Reassured benign    ANGIOMA (\"CHERRY ANGIOMA\")    Physical Exam:  Anatomic Location: scattered across sun exposed areas of the trunk and extremities   Morphologic Description: Firm red to reddish-blue discrete papules  Pertinent Positives:  Pertinent Negatives:    Additional History of Present Condition:  Present on exam.     Assessment and Plan:  Reassured " benign    DECUBITAS ULCER  Physical Exam:  Anatomic Location Affected:  Left Buttock  Morphological Description:  hyperkeratotic area  Pertinent Positives: pt sits mainly  Pertinent Negatives:    Additional History of Present Condition:  problem for over a year    Assessment and Plan:  Based on a thorough discussion of this condition and the management approach to it (including a comprehensive discussion of the known risks, side effects and potential benefits of treatment), the patient (family) agrees to implement the following specific plan:  Patient advised this an early bed sore and skin thickens to protect area. Would try Lac-Hydrin Cream   Advised gel foam pillow or memory foam pillow    Scribe Attestation      I,:  Danii Ha am acting as a scribe while in the presence of the attending physician.:       I,:  Domingo Zepeda MD personally performed the services described in this documentation    as scribed in my presence.:

## 2024-04-17 NOTE — PATIENT INSTRUCTIONS
"DECUBITAS ULCER  Physical Exam:  Anatomic Location Affected:  Left Buttock  Morphological Description:  hyperkeratotic area  Pertinent Positives: pt sits mainly  Pertinent Negatives:    Additional History of Present Condition:  problem for over a year    Assessment and Plan:  Based on a thorough discussion of this condition and the management approach to it (including a comprehensive discussion of the known risks, side effects and potential benefits of treatment), the patient (family) agrees to implement the following specific plan:  Patient advised this an early bed sore and skin thickens to protect area. Would try Lac-Hydrin Cream   Advised gel foam pillow or memory foam pillow    When outside we recommend using a wide brim hat, sunglasses, long sleeve and pants, sunscreen with SPF 30+ with reapplication every 2 hours, or SPF specific clothing     MELANOCYTIC NEVI (\"Moles\")    Melanocytic nevi (\"moles\") are tan or brown, raised or flat areas of the skin which have an increased number of melanocytes. Melanocytes are the cells in our body which make pigment and account for skin color.    Some moles are present at birth (I.e., \"congenital nevi\"), while others come up later in life (i.e., \"acquired nevi\").  The sun can stimulate the body to make more moles.  Sunburns are not the only thing that triggers more moles.  Chronic sun exposure can do it too.     Clinically distinguishing a healthy mole from melanoma may be difficult, even for experienced dermatologists. The \"ABCDE's\" of moles have been suggested as a means of helping to alert a person to a suspicious mole and the possible increased risk of melanoma.  The suggestions for raising alert are as follows:    Asymmetry: Healthy moles tend to be symmetric, while melanomas are often asymmetric.  Asymmetry means if you draw a line through the mole, the two halves do not match in color, size, shape, or surface texture. Asymmetry can be a result of rapid enlargement of " "a mole, the development of a raised area on a previously flat lesion, scaling, ulceration, bleeding or scabbing within the mole.  Any mole that starts to demonstrate \"asymmetry\" should be examined promptly by a board certified dermatologist.     Border: Healthy moles tend to have discrete, even borders.  The border of a melanoma often blends into the normal skin and does not sharply delineate the mole from normal skin.  Any mole that starts to demonstrate \"uneven borders\" should be examined promptly by a board certified dermatologist.     Color: Healthy moles tend to be one color throughout.  Melanomas tend to be made up of different colors ranging from dark black, blue, white, or red.  Any mole that demonstrates a color change should be examined promptly by a board certified dermatologist.     Diameter: Healthy moles tend to be smaller than 0.6 cm in size; an exception are \"congenital nevi\" that can be larger.  Melanomas tend to grow and can often be greater than 0.6 cm (1/4 of an inch, or the size of a pencil eraser). This is only a guideline, and many normal moles may be larger than 0.6 cm without being unhealthy.  Any mole that starts to change in size (small to bigger or bigger to smaller) should be examined promptly by a board certified dermatologist.     Evolving: Healthy moles tend to \"stay the same.\"  Melanomas may often show signs of change or evolution such as a change in size, shape, color, or elevation.  Any mole that starts to itch, bleed, crust, burn, hurt, or ulcerate or demonstrate a change or evolution should be examined promptly by a board certified dermatologist.      Dysplastic Nevi  Dysplastic moles are moles that fit the ABCDE rules of melanoma but are not identified as melanomas when examined under the microscope.  They may indicate an increased risk of melanoma in that person. If there is a family history of melanoma, most experts agree that the person may be at an increased risk for " "developing a melanoma.  Experts still do not agree on what dysplastic moles mean in patients without a personal or family history of melanoma.  Dysplastic moles are usually larger than common moles and have different colors within it with irregular borders. The appearance can be very similar to a melanoma. Biopsies of dysplastic moles may show abnormalities which are different from a regular mole.      Melanoma  Malignant melanoma is a type of skin cancer that can be deadly if it spreads throughout the body. The incidence of melanoma in the [place] is growing faster than any other cancer. Melanoma usually grows near the surface of the skin for a period of time, and then begins to grow deeper into the skin. Once it grows deeper into the skin, the risk of spread to other organs greatly increases. Therefore, early detection and removal of a malignant melanoma may result in a better chance at a complete cure; removal after the tumor has spread may not be as effective, leading to worse clinical outcomes such as death.    The true rate of nevus transformation into a melanoma is unknown. It has been estimated that the lifetime risk for any acquired melanocytic nevus on any 20-year-old individual transforming into melanoma by age 80 is 0.03% (1 in 3,164) for men and 0.009% (1 in 10,800) for women.     The appearance of a \"new mole\" remains one of the most reliable methods for identifying a malignant melanoma.  Occasionally, melanomas appear as rapidly growing, blue-black, dome-shaped bumps within a previous mole or previous area of normal skin.  Other times, melanomas are suspected when a mole suddenly appears or changes. Itching, burning, or pain in a pigmented lesion should increase suspicion, but most patients with early melanoma have no skin discomfort whatsoever.  Melanoma can occur anywhere on the skin, including areas that are difficult for self-examination. Many melanomas are first noticed by other family " "members.  Suspicious-looking moles may be removed for microscopic examination.       You may be able to prevent death from melanoma by doing two simple things:    Try to avoid unnecessary sun exposure and protect your skin when it is exposed to the sun.  People who live near the equator, people who have intermittent exposures to large amounts of sun, and people who have had sunburns in childhood or adolescence have an increased risk for melanoma. Sun sense and vigilant sun protection may be keys to helping to prevent melanoma.  We recommend wearing UPF-rated sun protective clothing and sunglasses whenever possible and applying a moisturizer-sunscreen combination product (SPF 50+) such as Neutrogena Daily Defense to sun exposed areas of skin at least three times a day.    Have your moles regularly examined by a board certified dermatologist AND by yourself or a family member/friend at home.  We recommend that you have your moles examined at least once a year by a board certified dermatologist.  Use your birthday as an annual reminder to have your \"Birthday Suit\" (I.e., your skin) examined; it is a nice birthday gift to yourself to know that your skin is healthy appearing!  Additionally, at-home self examinations may be helpful for detecting a possible melanoma.  Use the ABCDEs we discussed and check your moles once a month at home.        SEBORRHEIC KERATOSIS  A seborrheic keratosis is a harmless warty spot that appears during adult life as a common sign of skin aging.  Seborrheic keratoses can arise on any area of skin, covered or uncovered, with the usual exception of the palms and soles. They do not arise from mucous membranes. Seborrheic keratoses can have highly variable appearance.      Seborrheic keratoses are extremely common. It has been estimated that over 90% of adults over the age of 60 years have one or more of them. They occur in males and females of all races, typically beginning to erupt in the 30s or " "40s. They are uncommon under the age of 20 years.  The precise cause of seborrhoeic keratoses is not known.  Seborrhoeic keratoses are considered degenerative in nature. As time goes by, seborrheic keratoses tend to become more numerous. Some people inherit a tendency to develop a very large number of them; some people may have hundreds of them.    The name \"seborrheic keratosis\" is misleading, because these lesions are not limited to a seborrhoeic distribution (scalp, mid-face, chest, upper back), nor are they formed from sebaceous glands, nor are they associated with sebum -- which is greasy.  Seborrheic keratosis may also be called \"SK,\" \"Seb K,\" \"basal cell papilloma,\" \"senile wart,\" or \"barnacle.\"      There is no easy way to remove multiple lesions on a single occasion.  Unless a specific lesion is \"inflamed\" and is causing pain or stinging/burning or is bleeding, most insurance companies do not authorize treatment.      ANGIOMA (\"CHERRY ANGIOMA\")  Hawkins angiomas markedly increase in number from about the age of 40, so it has been estimated that 75% of people over 75 years of age have them. Although they also called \"senile angiomas,\" they can occur in young people too - 5% of adolescents have been found to have them.     Cherry angiomas are very common in males and females of any age or race, with no difference in sexes or races affected. They are however more noticeable in white skin than in skin of colour.  There may be a family history of similar lesions. Eruptive (very large number appearing in a short period of time) cherry angiomas have been rarely reported to be associated with internal malignancy and pregnancy.    "

## 2024-04-28 ENCOUNTER — APPOINTMENT (EMERGENCY)
Dept: RADIOLOGY | Facility: HOSPITAL | Age: 84
End: 2024-04-28
Payer: MEDICARE

## 2024-04-28 ENCOUNTER — HOSPITAL ENCOUNTER (EMERGENCY)
Facility: HOSPITAL | Age: 84
Discharge: HOME | End: 2024-04-28
Attending: EMERGENCY MEDICINE
Payer: MEDICARE

## 2024-04-28 VITALS
OXYGEN SATURATION: 96 % | HEART RATE: 88 BPM | SYSTOLIC BLOOD PRESSURE: 129 MMHG | RESPIRATION RATE: 18 BRPM | DIASTOLIC BLOOD PRESSURE: 61 MMHG | TEMPERATURE: 97.8 F

## 2024-04-28 DIAGNOSIS — M25.562 ACUTE PAIN OF LEFT KNEE: ICD-10-CM

## 2024-04-28 DIAGNOSIS — M25.461 PAIN AND SWELLING OF RIGHT KNEE: Primary | ICD-10-CM

## 2024-04-28 DIAGNOSIS — M25.561 PAIN AND SWELLING OF RIGHT KNEE: Primary | ICD-10-CM

## 2024-04-28 PROCEDURE — 73564 X-RAY EXAM KNEE 4 OR MORE: CPT

## 2024-04-28 PROCEDURE — 99283 EMERGENCY DEPT VISIT LOW MDM: CPT

## 2024-04-28 PROCEDURE — 99284 EMERGENCY DEPT VISIT MOD MDM: CPT | Performed by: EMERGENCY MEDICINE

## 2024-04-28 RX ORDER — ACETAMINOPHEN 325 MG/1
975 TABLET ORAL ONCE
Status: COMPLETED | OUTPATIENT
Start: 2024-04-28 | End: 2024-04-28

## 2024-04-28 RX ADMIN — ACETAMINOPHEN 975 MG: 325 TABLET, FILM COATED ORAL at 15:26

## 2024-04-28 NOTE — DISCHARGE INSTRUCTIONS
Please follow up PCP and ortho. Recommend tylenol 650 mg and ibuprofen 600 mg every 6 hours as needed for pain. Please return for severe chest pain, significant shortness of breath, severely worsening symptoms, or any other concerning signs or symptoms. Please refer to the following documents for additional instructions and return precautions.

## 2024-04-28 NOTE — ED PROVIDER NOTES
History  Chief Complaint   Patient presents with    Knee Pain     Pt reports bilateral knee pain, worse on the R, and swelling since Friday, denies injuries, falls. States she's been seeing physical therapy over the past few weeks, denies any over exertion.     83-year-old female history of diabetes and hypertension presenting with knee pain.  Patient reports right greater than left knee pain and worsening for the past couple of days.  Reports starting physical therapy this past week.  Denies any known injury or fall or trauma.  Denies any neurological changes such as motor or sensory deficits.  Right greater than left pain and worse swelling on the left primarily medially.  Patient reports history of joint injections but skipped her last appointment because she does not feel like it is making a difference.  Denies any chest pain shortness of breath.  Denies any warmth or redness or swelling.  Denies any fevers.  Denies any other complaints.  Chart reviewed.     Past Medical History:  No date: Diabetes mellitus (HCC)  No date: Hyperlipidemia  No date: Hypertension  Family History: non-contributory  Social History          Prior to Admission Medications   Prescriptions Last Dose Informant Patient Reported? Taking?   Accu-Chek FastClix Lancets MISC  Self Yes No   Sig: Use   B Complex-Biotin-FA (HM Vitamin B100 Complex) TABS  Self Yes No   Sig: Take by mouth   B-D ULTRAFINE III SHORT PEN 31G X 8 MM MISC   Yes No   Si (four) times a day   Blood Glucose Monitoring Suppl (Accu-Chek Guide) w/Device KIT  Self Yes No   Sig: Use   Polyvinyl Alcohol-Povidone PF 1.4-0.6 % SOLN   Yes No   Sig: Apply to eye   Probiotic Product (Align) 4 MG CAPS  Self Yes No   Sig: Take 1 capsule by mouth   Restasis 0.05 % ophthalmic emulsion  Self Yes No   Sig: INSTILL 1 DROP INTO BOTH EYES TWICE A DAY   amLODIPine (NORVASC) 5 mg tablet   Yes No   Sig: Take 5 mg by mouth daily   ammonium lactate (LAC-HYDRIN) 12 % cream   No No   Sig: Apply  topically as needed for dry skin   ascorbic Acid (VITAMIN C) 500 MG CPCR  Self Yes No   Sig: capsule, extended release: 500 mg 1 capsule twice a day by mouth   benazepril (LOTENSIN) 10 mg tablet   No No   Sig: TAKE 1 TABLET BY MOUTH EVERY DAY   Patient not taking: Reported on 2024   cilostazol (PLETAL) 100 mg tablet   No No   Sig: TAKE 1 TABLET BY MOUTH TWICE A DAY   fluticasone (FLONASE) 50 mcg/act nasal spray  Self No No   Si SPRAY INTO EACH NOSTRIL DAILY AT BEDTIME   furosemide (LASIX) 20 mg tablet  Self No No   Sig: TAKE 1 TABLET BY MOUTH TWICE A DAY   glipiZIDE (GLUCOTROL XL) 5 mg 24 hr tablet  Self Yes No   glucose blood (Accu-Chek Guide) test strip  Self Yes No   Sig: TEST 4 TIMES DAILY DX E11.65   glucose blood test strip  Self Yes No   Sig: Use 1 each daily as needed Use as instructed   insulin aspart (NovoLOG FlexPen ReliOn) 100 UNIT/ML injection pen   Yes No   Sig: As directed UP TP 75 UNITS PER DAYAs directed UP TP 75 UNITS PER DAY   insulin aspart protamine-insulin aspart (NovoLOG 70/30) 100 units/mL injection  Self Yes No   Sig: Inject 8 Units under the skin 2 (two) times a day before meals Sliding scale >140   insulin degludec (Tresiba FlexTouch) 100 units/mL injection pen  Self Yes No   Sig: Inject 5 Units under the skin   ipratropium (ATROVENT) 0.06 % nasal spray  Self Yes No   losartan-hydrochlorothiazide (HYZAAR) 100-25 MG per tablet   Yes No   Sig: Take 1 tablet by mouth daily   magnesium hydroxide (MILK OF MAGNESIA) 400 mg/5 mL oral suspension  Self Yes No   Sig: Take by mouth daily as needed   metoprolol tartrate (LOPRESSOR) 25 mg tablet  Self No No   Sig: TAKE 1 TABLET BY MOUTH EVERY DAY   Patient taking differently: 25 mg Twice a day   montelukast (SINGULAIR) 10 mg tablet   No No   Sig: TAKE 1 TABLET BY MOUTH EVERYDAY AT BEDTIME   nystatin (MYCOSTATIN) cream   No No   Sig: APPLY TO AFFECTED AREA TWICE A DAY   ondansetron (ZOFRAN-ODT) 4 mg disintegrating tablet  Self No No   Sig: TAKE  1 TABLET (4 MG TOTAL) BY MOUTH EVERY 6 (SIX) HOURS AS NEEDED FOR NAUSEA OR VOMITING   polyethylene glycol (GLYCOLAX) 17 GM/SCOOP powder  Self Yes No   Sig: Take 17 g by mouth daily   rosuvastatin (CRESTOR) 10 MG tablet   Yes No   Sig: Take 10 mg by mouth daily   simethicone (MYLICON) 80 mg chewable tablet  Self Yes No   Sig: Chew 80 mg   sucralfate (CARAFATE) 1 g tablet   No No   Sig: TAKE 1 TABLET BY ORAL ROUTE 4 TIMES EVERY DAY ON AN EMPTY STOMACH 1 HOUR BEFORE MEALS AND AT BEDTIME      Facility-Administered Medications: None       Past Medical History:   Diagnosis Date    Diabetes mellitus (HCC)     Hyperlipidemia     Hypertension        Past Surgical History:   Procedure Laterality Date    CARDIAC SURGERY      COLONOSCOPY         Family History   Problem Relation Age of Onset    Heart disease Mother     Diabetes Mother     Hypertension Mother     Heart disease Father     Hypertension Father     Diabetes Father      I have reviewed and agree with the history as documented.    E-Cigarette/Vaping    E-Cigarette Use Never User      E-Cigarette/Vaping Substances    Nicotine No     THC No     CBD No     Flavoring No     Other No     Unknown No      Social History     Tobacco Use    Smoking status: Never    Smokeless tobacco: Never   Vaping Use    Vaping status: Never Used   Substance Use Topics    Alcohol use: Yes     Alcohol/week: 1.0 standard drink of alcohol     Types: 1 Glasses of wine per week     Comment: rarely     Drug use: Never       Review of Systems   Constitutional:  Negative for appetite change, chills, diaphoresis, fever and unexpected weight change.   HENT:  Negative for congestion and rhinorrhea.    Eyes:  Negative for photophobia and visual disturbance.   Respiratory:  Negative for cough, chest tightness and shortness of breath.    Cardiovascular:  Negative for chest pain, palpitations and leg swelling.   Gastrointestinal:  Negative for abdominal distention, abdominal pain, blood in stool,  constipation, diarrhea, nausea and vomiting.   Genitourinary:  Negative for dysuria and hematuria.   Musculoskeletal:  Positive for arthralgias and joint swelling. Negative for back pain, neck pain and neck stiffness.   Skin:  Negative for color change, pallor, rash and wound.   Neurological:  Negative for dizziness, syncope, weakness, light-headedness and headaches.   Psychiatric/Behavioral:  Negative for agitation.    All other systems reviewed and are negative.      Physical Exam  Physical Exam  Vitals and nursing note reviewed.   Constitutional:       General: She is not in acute distress.     Appearance: Normal appearance. She is well-developed. She is not ill-appearing, toxic-appearing or diaphoretic.   HENT:      Head: Normocephalic and atraumatic.      Nose: Nose normal. No congestion or rhinorrhea.      Mouth/Throat:      Mouth: Mucous membranes are moist.      Pharynx: Oropharynx is clear. No oropharyngeal exudate or posterior oropharyngeal erythema.   Eyes:      General: No scleral icterus.        Right eye: No discharge.         Left eye: No discharge.      Extraocular Movements: Extraocular movements intact.      Conjunctiva/sclera: Conjunctivae normal.      Pupils: Pupils are equal, round, and reactive to light.   Neck:      Vascular: No JVD.      Trachea: No tracheal deviation.      Comments: Supple. Normal range of motion.   Cardiovascular:      Rate and Rhythm: Normal rate and regular rhythm.      Heart sounds: Normal heart sounds. No murmur heard.     No friction rub. No gallop.      Comments: Normal rate and regular rhythm  Pulmonary:      Effort: Pulmonary effort is normal. No respiratory distress.      Breath sounds: Normal breath sounds. No stridor. No wheezing or rales.      Comments: Clear to auscultation bilaterally  Chest:      Chest wall: No tenderness.   Abdominal:      General: Bowel sounds are normal. There is no distension.      Palpations: Abdomen is soft.      Tenderness: There is no  abdominal tenderness. There is no right CVA tenderness, left CVA tenderness, guarding or rebound.      Comments: Soft, nontender, nondistended.  Normal bowel sounds throughout   Musculoskeletal:         General: Swelling and tenderness present. No deformity or signs of injury. Normal range of motion.      Cervical back: Normal range of motion and neck supple. No rigidity. No muscular tenderness.      Right lower leg: No edema.      Left lower leg: No edema.      Comments: Mild swelling medial aspect of right knee.  Joint stable.  Tenderness primarily along medial aspect of right knee and tendon insertion along medial aspect of right knee.  Range of motion intact.   Lymphadenopathy:      Cervical: No cervical adenopathy.   Skin:     General: Skin is warm and dry.      Coloration: Skin is not pale.      Findings: No erythema or rash.   Neurological:      General: No focal deficit present.      Mental Status: She is alert. Mental status is at baseline.      Sensory: No sensory deficit.      Motor: No weakness or abnormal muscle tone.      Coordination: Coordination normal.      Gait: Gait normal.      Comments: Alert.  Strength and sensation grossly intact.  Ambulatory without difficulty at baseline.    Psychiatric:         Behavior: Behavior normal.         Thought Content: Thought content normal.         Vital Signs  ED Triage Vitals   Temperature Pulse Respirations Blood Pressure SpO2   04/28/24 1422 04/28/24 1422 04/28/24 1422 04/28/24 1422 04/28/24 1422   97.8 °F (36.6 °C) 85 18 142/65 95 %      Temp Source Heart Rate Source Patient Position - Orthostatic VS BP Location FiO2 (%)   04/28/24 1422 04/28/24 1422 04/28/24 1422 04/28/24 1422 --   Temporal Monitor Sitting Left arm       Pain Score       04/28/24 1523       6           Vitals:    04/28/24 1422 04/28/24 1447   BP: 142/65 129/61   Pulse: 85 88   Patient Position - Orthostatic VS: Sitting Sitting         Visual Acuity      ED Medications  Medications    acetaminophen (TYLENOL) tablet 975 mg (975 mg Oral Given 4/28/24 1526)       Diagnostic Studies  Results Reviewed       None                   XR knee 4+ views Right injury    (Results Pending)   XR knee 4+ views left injury    (Results Pending)              Procedures  Procedures         ED Course                               SBIRT 20yo+      Flowsheet Row Most Recent Value   Initial Alcohol Screen: US AUDIT-C     1. How often do you have a drink containing alcohol? 0 Filed at: 04/28/2024 1449   2. How many drinks containing alcohol do you have on a typical day you are drinking?  0 Filed at: 04/28/2024 1449   3a. Male UNDER 65: How often do you have five or more drinks on one occasion? 0 Filed at: 04/28/2024 1449   3b. FEMALE Any Age, or MALE 65+: How often do you have 4 or more drinks on one occassion? 0 Filed at: 04/28/2024 1449   Audit-C Score 0 Filed at: 04/28/2024 1449   MONALISA: How many times in the past year have you...    Used an illegal drug or used a prescription medication for non-medical reasons? Never Filed at: 04/28/2024 1449                      Medical Decision Making  83-year-old female history of diabetes and hypertension presenting with knee pain.  Exacerbation of chronic knee pain in setting of seasonal changes and new physical therapy.  Plan for x-rays.  Symptom management with p.o. medications and topical ice.  Ace wrap.  Reassess.    X-rays interpreted by me with arthritis but no acute osseous process. Discussed results and recommendations. Advised follow up PCP and ortho. Medication recommendations. Given instructions and return precautions. Patient/family at bedside acknowledged understanding of all written and verbal instructions and return precautions. Discharged.     Amount and/or Complexity of Data Reviewed  Radiology: ordered.    Risk  OTC drugs.             Disposition  Final diagnoses:   Pain and swelling of right knee   Acute pain of left knee     Time reflects when diagnosis was  documented in both MDM as applicable and the Disposition within this note       Time User Action Codes Description Comment    4/28/2024  3:37 PM Dav Garcia Add [M25.561,  M25.461] Pain and swelling of right knee     4/28/2024  3:37 PM Dav Garcia Add [M25.562] Acute pain of left knee           ED Disposition       ED Disposition   Discharge    Condition   Stable    Date/Time   Sun Apr 28, 2024 1537    Comment   Britni Curran discharge to home/self care.                   Follow-up Information       Follow up With Specialties Details Why Contact Info Additional Information    Shonna Garnett DO Family Medicine Schedule an appointment as soon as possible for a visit in 1 week  [address] [facility] [address]  [phone]       [facility] Schedule an appointment as soon as possible for a visit in 1 week  [address] [address] [phone] [facility] [address], [place], [place], [address]   [phone]            Patient's Medications   Discharge Prescriptions    No medications on file       No discharge procedures on file.    PDMP Review         Value Time User    PDMP Reviewed  Yes 8/29/2023 12:41 PM Shonna Garnett DO            ED Provider  Electronically Signed by             Dav Garcia MD  04/28/24 1538       Dav Garcia MD  04/28/24 1558

## 2024-05-13 ENCOUNTER — TELEPHONE (OUTPATIENT)
Age: 84
End: 2024-05-13

## 2024-05-13 DIAGNOSIS — G89.29 CHRONIC PAIN OF RIGHT KNEE: Primary | ICD-10-CM

## 2024-05-13 DIAGNOSIS — M25.561 CHRONIC PAIN OF RIGHT KNEE: Primary | ICD-10-CM

## 2024-05-13 DIAGNOSIS — M79.604 PAIN IN BOTH LOWER EXTREMITIES: ICD-10-CM

## 2024-05-13 DIAGNOSIS — M79.605 PAIN IN BOTH LOWER EXTREMITIES: ICD-10-CM

## 2024-05-13 NOTE — TELEPHONE ENCOUNTER
Patient called to say she just finished physical therapy with [facility] for her back.    She would like an order placed with [facility] for physical therapy to address Right knee and both legs.    Please contact patient when order has been placed.

## 2024-07-08 DIAGNOSIS — I73.9 PVD (PERIPHERAL VASCULAR DISEASE) (HCC): ICD-10-CM

## 2024-07-08 RX ORDER — CILOSTAZOL 100 MG/1
100 TABLET ORAL 2 TIMES DAILY
Qty: 60 TABLET | Refills: 0 | Status: SHIPPED | OUTPATIENT
Start: 2024-07-08

## 2024-07-19 ENCOUNTER — OFFICE VISIT (OUTPATIENT)
Age: 84
End: 2024-07-19
Payer: MEDICARE

## 2024-07-19 VITALS
RESPIRATION RATE: 18 BRPM | WEIGHT: 226.4 LBS | OXYGEN SATURATION: 98 % | HEART RATE: 81 BPM | SYSTOLIC BLOOD PRESSURE: 131 MMHG | DIASTOLIC BLOOD PRESSURE: 59 MMHG | TEMPERATURE: 97.6 F | BODY MASS INDEX: 42.74 KG/M2 | HEIGHT: 61 IN

## 2024-07-19 DIAGNOSIS — Z79.4 TYPE 2 DIABETES MELLITUS WITHOUT COMPLICATION, WITH LONG-TERM CURRENT USE OF INSULIN (HCC): Primary | ICD-10-CM

## 2024-07-19 DIAGNOSIS — E66.01 MORBID (SEVERE) OBESITY DUE TO EXCESS CALORIES (HCC): ICD-10-CM

## 2024-07-19 DIAGNOSIS — E11.9 TYPE 2 DIABETES MELLITUS WITHOUT COMPLICATION, WITH LONG-TERM CURRENT USE OF INSULIN (HCC): Primary | ICD-10-CM

## 2024-07-19 DIAGNOSIS — I10 ESSENTIAL HYPERTENSION: ICD-10-CM

## 2024-07-19 DIAGNOSIS — R13.14 PHARYNGOESOPHAGEAL DYSPHAGIA: ICD-10-CM

## 2024-07-19 LAB — SL AMB POCT HEMOGLOBIN AIC: 7.2 (ref ?–6.5)

## 2024-07-19 PROCEDURE — 83036 HEMOGLOBIN GLYCOSYLATED A1C: CPT | Performed by: FAMILY MEDICINE

## 2024-07-19 PROCEDURE — 99214 OFFICE O/P EST MOD 30 MIN: CPT | Performed by: FAMILY MEDICINE

## 2024-07-19 RX ORDER — INSULIN ASPART INJECTION 100 [IU]/ML
INJECTION, SOLUTION SUBCUTANEOUS
COMMUNITY
Start: 2024-05-14

## 2024-07-19 NOTE — PROGRESS NOTES
"Ambulatory Visit  Name: Britni Curran      : 1940      MRN: [id number]  Encounter Provider: Shonna Garnett DO  Encounter Date: 2024   Encounter department: [facility] [place]    Assessment & Plan   1. Type 2 diabetes mellitus without complication, with long-term current use of insulin (HCC)- controlled with A1c of  7.2. continue basal and prandial insulin  -     POCT hemoglobin A1c  -     Lipid Panel with Direct LDL reflex; Future; Expected date: 2024  -     Albumin / creatinine urine ratio; Future; Expected date: 2024  2. Morbid (severe) obesity due to excess calories (HCC)- dietary changes discussed.   3. Essential hypertension-controlled on amlodipine 5mg qd,losartan-hctz 100mg-25mg and metoprolol tartrate 25mg bid   -     Comprehensive metabolic panel; Future; Expected date: 2024  4. Pharyngoesophageal dysphagia  -     FL Barium Swallow Motility Study; Future; Expected date: 2024       History of Present Illness     Pt c/o difficulty swallwoing. Onset years. Worsening over months. Vomting food bolus. Hard to get dry things down. Even water gets stuck occasionly.   Hx of gastritigtis       Review of Systems   HENT:  Positive for trouble swallowing.    Respiratory:  Negative for shortness of breath.    Cardiovascular:  Negative for chest pain.   Gastrointestinal:  Positive for constipation.   Musculoskeletal:  Positive for gait problem.       Objective     /59 (BP Location: Left arm, Patient Position: Sitting, Cuff Size: Standard)   Pulse 81   Temp 97.6 °F (36.4 °C) (Tympanic)   Resp 18   Ht 5' 1\" (1.549 m)   Wt 103 kg (226 lb 6.4 oz)   SpO2 98%   BMI 42.78 kg/m²     Physical Exam  HENT:      Head: Normocephalic.      Right Ear: External ear normal.      Left Ear: External ear normal.   Eyes:      Conjunctiva/sclera: Conjunctivae normal.      Pupils: Pupils are equal, round, and reactive to light.   Cardiovascular:      Rate and " Rhythm: Normal rate and regular rhythm.      Pulses: Pulses are weak.           Dorsalis pedis pulses are 1+ on the right side and 1+ on the left side.      Heart sounds: No murmur heard.  Pulmonary:      Effort: Pulmonary effort is normal.      Breath sounds: Normal breath sounds.   Abdominal:      General: Bowel sounds are normal.      Palpations: Abdomen is soft.   Feet:      Right foot:      Skin integrity: No ulcer, skin breakdown, erythema, warmth, callus or dry skin.      Left foot:      Skin integrity: No ulcer, skin breakdown, erythema, warmth, callus or dry skin.   Neurological:      Mental Status: She is alert and oriented to person, place, and time.      Gait: Gait abnormal.   Psychiatric:         Mood and Affect: Mood normal.         Behavior: Behavior normal.     Administrative Statements               Diabetic Foot Exam    Patient's shoes and socks removed.    Right Foot/Ankle   Right Foot Inspection  Skin Exam: skin normal and skin intact. No dry skin, no warmth, no callus, no erythema, no maceration, no abnormal color, no pre-ulcer, no ulcer and no callus.     Toe Exam: ROM and strength within normal limits.     Sensory   Monofilament testing: intact    Vascular  Capillary refills: < 3 seconds  The right DP pulse is 1+.     Left Foot/Ankle  Left Foot Inspection  Skin Exam: skin normal and skin intact. No dry skin, no warmth, no erythema, no maceration, normal color, no pre-ulcer, no ulcer and no callus.     Toe Exam: ROM and strength within normal limits.     Sensory   Monofilament testing: intact    Vascular  Capillary refills: < 3 seconds  The left DP pulse is 1+.     Assign Risk Category  No deformity present  No loss of protective sensation  Weak pulses  Risk: 2

## 2024-08-01 DIAGNOSIS — I73.9 PVD (PERIPHERAL VASCULAR DISEASE) (HCC): ICD-10-CM

## 2024-08-02 DIAGNOSIS — I25.810 CORONARY ARTERY DISEASE INVOLVING CORONARY BYPASS GRAFT OF NATIVE HEART WITHOUT ANGINA PECTORIS: ICD-10-CM

## 2024-08-02 DIAGNOSIS — I73.9 PVD (PERIPHERAL VASCULAR DISEASE) (HCC): Primary | ICD-10-CM

## 2024-08-02 RX ORDER — CILOSTAZOL 100 MG/1
100 TABLET ORAL 2 TIMES DAILY
Qty: 60 TABLET | Refills: 0 | Status: SHIPPED | OUTPATIENT
Start: 2024-08-02

## 2024-08-06 ENCOUNTER — TELEPHONE (OUTPATIENT)
Age: 84
End: 2024-08-06

## 2024-08-06 DIAGNOSIS — R13.12 OROPHARYNGEAL DYSPHAGIA: Primary | ICD-10-CM

## 2024-08-06 NOTE — TELEPHONE ENCOUNTER
[facility] requesting clarification on barium swallow motility study. There are 3 options to choose from to schedule this: with air, without air, and with speech. They need to know which the provider needs.    Please fax updated order to: [phone]

## 2024-08-15 ENCOUNTER — HOSPITAL ENCOUNTER (OUTPATIENT)
Dept: RADIOLOGY | Facility: HOSPITAL | Age: 84
End: 2024-08-15
Payer: MEDICARE

## 2024-08-15 DIAGNOSIS — R13.12 OROPHARYNGEAL DYSPHAGIA: ICD-10-CM

## 2024-08-15 PROCEDURE — 92611 MOTION FLUOROSCOPY/SWALLOW: CPT

## 2024-08-15 PROCEDURE — 74230 X-RAY XM SWLNG FUNCJ C+: CPT

## 2024-08-15 NOTE — PROCEDURES
Speech Pathology - Modified Barium Swallow Study    Patient Name: Britni Curran    Today's Date: 8/15/2024     Problem List  Active Problems:  There are no active Hospital Problems.      Past Medical History  Past Medical History:   Diagnosis Date    Diabetes mellitus (HCC)     Hyperlipidemia     Hypertension        Past Surgical History  Past Surgical History:   Procedure Laterality Date    CARDIAC SURGERY      COLONOSCOPY         Assessment Summary:    Pt presents with WFL oropharyngeal swallow based off of consistencies administered today.  Esophageal sweep completed in the lateral view and significant for esophogeal retention with minimal clearance.    Note: Images are available for review in PACS as desired.    Recommendations:   Recommended Diet: regular diet and thin liquids -  added moisture as able   Recommended Form of Medications:  as preferred/tolerated     Aspiration precautions and compensatory swallowing strategies: upright posture and small bites/sips  Consider referral to:  GI  SLP Dysphagia therapy recommended: None at this time     Results Reviewed with: patient and MD   Pt/Family Education: initiated.     General Information;  Pt is a 83 y.o. female with a PMH remarkable for difficulty swallwoing. Onset years. Worsening over months. Vomting food bolus. Hard to get dry things down. Even water gets stuck occasionly.   Hx of gastritigtis .    MBS was recommended to assess oropharyngeal stage swallowing skills at this time.     Prior MBS: none on file     Oral Mechanism Exam  Facial: symmetrical  Labial: WFL  Lingual: WFL  Velum: symmetrical  Mandible: adequate ROM  Dentition: adequate  Vocal quality: clear/adequate   Volitional Cough: strong/productive   Respiratory Status: on RA      Pt was viewed sitting upright in the lateral and AP positions. Due to concerns for patient safety / patient refusal, trials provided deviated from the MBSImP Validated Protocol. Pt was given 20-mL cup sip thin,  40-mL sequential swallow thin, 5-mL pudding, and ½ cookie coated with 3-mL pudding.     Initial view observations/comments: Limited view secondary to body habitus      8-Point Penetration-Aspiration Scale   Thin liquid 1 - Material does not enter the airway   Nectar thick liquid 1 - Material does not enter the airway   Honey thick liquid 1 - Material does not enter the airway   Puree (pudding) 1 - Material does not enter the airway   Solid 1 - Material does not enter the airway     Strategies and Efficacy: NA    Aspiration Response and Efficacy:  NA    MBS IMP Rating    ORAL Impairment  Compinent 1--Lip Closure  Judged at any point during the swallow.  0 - No labial escape    Component 2--Tongue Control During Bolus Hold  Judged on held liquid boluses only and prior to productive tongue movement.   0 - Cohesive bolus between tongue to palatal seal    Component 3--Bolus Preparation/Mastication  Judged only during presentation of 1/2 shortbread cookie coated in pudding.   0 - Timely and efficient chewing and mashing    Component 4--Bolus Transport/Lingual Motion  Judged after first productive tongue movement for oral bolus transport.  1 - Delayed initiation of tongue motion    Component 5--Oral Residue  Judged after first swallow or after the last swallow of the sequential swallow task.  0 - Complete oral clearance      Component 6--Initiation of Pharyngeal Swallow  Judged at first movement of the brisk superior-anterior hyoid trajectory.  1 - Bolus Head in valleculae      PHARYNGEAL Impairment  Component 7--Soft Palate Elevation  Judged during maximum displacement of soft palate.  0 - No bolus between the soft palate (SP)/pharyngeal wall (PW)    Component 8--Laryngeal Elevation  Judged when epiglottis is in its most horizontal position.  0 - Complete superior movement of thyroid cartilage with complete approximation of arytenoids to epiglottic petiole    Component 9--Anterior Hyoid Excursion  Judged at height of  swallow/maximal anterior hyoid displacement.  0 - Complete anterior movement    Component 10--Epiglottic Movement  Judged at height of swallow/maximal anterior hyoid displacement.  0 - Complete inversion    Component 11--Laryngeal Vestibular Closure  Judged at height of swallow/maximal anterior hyoid displacement.  0 - Complete; no air/contrast in laryngeal vestibule    Component 12--Pharyngeal Stripping Wave  Judged during the full duration of the pharyngeal swallow.  0 - Present - complete    Component 13--Pharyngeal Contraction  Judged in AP view at rest and throughout maximum movement of structures.  NA    Component 14--Pharyngoesophageal Segment Opening  Judged during maximum distension of PES and throughout opening and closure.  0 - Complete distension and complete duration; no obstruction of flow    Component 15--Tongue Base (TB) Retraction  Judged during maximum retraction of the tongue base.  0 - No contrast between TB and posterior pharyngeal wall (PW)    Component 16--Pharyngeal Residue  Judged after first swallow or after the last swallow of the sequential swallow task.  0 - Complete pharyngeal clearance        ESOPHAGEAL Impairment  Component 17--Esophageal Clearance Upright Position  Judged in AP view during bolus transit through the oral cavity to the LES  4 - Minimal to no esophageal clearance         Dorothea Watson MS, CCC-SLP  Speech-Language Pathologist  [place] #[id number]  [place] #[id number]

## 2024-08-26 DIAGNOSIS — I73.9 PVD (PERIPHERAL VASCULAR DISEASE) (HCC): ICD-10-CM

## 2024-08-27 RX ORDER — CILOSTAZOL 100 MG/1
100 TABLET ORAL 2 TIMES DAILY
Qty: 180 TABLET | Refills: 1 | Status: SHIPPED | OUTPATIENT
Start: 2024-08-27

## 2025-01-24 ENCOUNTER — OFFICE VISIT (OUTPATIENT)
Age: 85
End: 2025-01-24
Payer: MEDICARE

## 2025-01-24 VITALS
HEIGHT: 61 IN | HEART RATE: 83 BPM | RESPIRATION RATE: 16 BRPM | TEMPERATURE: 97.5 F | OXYGEN SATURATION: 95 % | WEIGHT: 228.4 LBS | SYSTOLIC BLOOD PRESSURE: 118 MMHG | BODY MASS INDEX: 43.12 KG/M2 | DIASTOLIC BLOOD PRESSURE: 60 MMHG

## 2025-01-24 DIAGNOSIS — Z79.4 TYPE 2 DIABETES MELLITUS WITHOUT COMPLICATION, WITH LONG-TERM CURRENT USE OF INSULIN (HCC): Primary | ICD-10-CM

## 2025-01-24 DIAGNOSIS — L82.1 SK (SEBORRHEIC KERATOSIS): ICD-10-CM

## 2025-01-24 DIAGNOSIS — R13.12 OROPHARYNGEAL DYSPHAGIA: ICD-10-CM

## 2025-01-24 DIAGNOSIS — E11.9 TYPE 2 DIABETES MELLITUS WITHOUT COMPLICATION, WITH LONG-TERM CURRENT USE OF INSULIN (HCC): Primary | ICD-10-CM

## 2025-01-24 DIAGNOSIS — Z00.00 MEDICARE ANNUAL WELLNESS VISIT, SUBSEQUENT: ICD-10-CM

## 2025-01-24 DIAGNOSIS — I10 ESSENTIAL HYPERTENSION: ICD-10-CM

## 2025-01-24 DIAGNOSIS — E66.01 MORBID (SEVERE) OBESITY DUE TO EXCESS CALORIES (HCC): ICD-10-CM

## 2025-01-24 PROBLEM — I73.9 PVD (PERIPHERAL VASCULAR DISEASE) (HCC): Status: RESOLVED | Noted: 2021-05-14 | Resolved: 2025-01-24

## 2025-01-24 LAB — SL AMB POCT HEMOGLOBIN AIC: 7.8 (ref ?–6.5)

## 2025-01-24 PROCEDURE — G2211 COMPLEX E/M VISIT ADD ON: HCPCS | Performed by: FAMILY MEDICINE

## 2025-01-24 PROCEDURE — G0439 PPPS, SUBSEQ VISIT: HCPCS | Performed by: FAMILY MEDICINE

## 2025-01-24 PROCEDURE — 99214 OFFICE O/P EST MOD 30 MIN: CPT | Performed by: FAMILY MEDICINE

## 2025-01-24 PROCEDURE — 83036 HEMOGLOBIN GLYCOSYLATED A1C: CPT | Performed by: FAMILY MEDICINE

## 2025-01-24 RX ORDER — MECLIZINE HYDROCHLORIDE 25 MG/1
TABLET ORAL 3 TIMES DAILY PRN
COMMUNITY

## 2025-01-24 RX ORDER — INSULIN ASPART 100 [IU]/ML
10 INJECTION, SUSPENSION SUBCUTANEOUS
Qty: 18 ML | Refills: 2 | Status: SHIPPED | OUTPATIENT
Start: 2025-01-24

## 2025-01-24 RX ORDER — OFLOXACIN 3 MG/ML
SOLUTION AURICULAR (OTIC)
COMMUNITY
Start: 2025-01-13

## 2025-01-24 NOTE — PATIENT INSTRUCTIONS
Medicare Preventive Visit Patient Instructions  Thank you for completing your Welcome to Medicare Visit or Medicare Annual Wellness Visit today. Your next wellness visit will be due in one year (1/25/2026).  The screening/preventive services that you may require over the next 5-10 years are detailed below. Some tests may not apply to you based off risk factors and/or age. Screening tests ordered at today's visit but not completed yet may show as past due. Also, please note that scanned in results may not display below.  Preventive Screenings:  Service Recommendations Previous Testing/Comments   Colorectal Cancer Screening  * Colonoscopy    * Fecal Occult Blood Test (FOBT)/Fecal Immunochemical Test (FIT)  * Fecal DNA/Cologuard Test  * Flexible Sigmoidoscopy Age: 45-75 years old   Colonoscopy: every 10 years (may be performed more frequently if at higher risk)  OR  FOBT/FIT: every 1 year  OR  Cologuard: every 3 years  OR  Sigmoidoscopy: every 5 years  Screening may be recommended earlier than age 45 if at higher risk for colorectal cancer. Also, an individualized decision between you and your healthcare provider will decide whether screening between the ages of 76-85 would be appropriate. Colonoscopy: 02/13/2023  FOBT/FIT: Not on file  Cologuard: Not on file  Sigmoidoscopy: Not on file          Breast Cancer Screening Age: 40+ years old  Frequency: every 1-2 years  Not required if history of left and right mastectomy Mammogram: 07/09/2021        Cervical Cancer Screening Between the ages of 21-29, pap smear recommended once every 3 years.   Between the ages of 30-65, can perform pap smear with HPV co-testing every 5 years.   Recommendations may differ for women with a history of total hysterectomy, cervical cancer, or abnormal pap smears in past. Pap Smear: Not on file    Screening Not Indicated   Hepatitis C Screening Once for adults born between 1945 and 1965  More frequently in patients at high risk for Hepatitis C  Hep C Antibody: 10/15/2021    Screening Current   Diabetes Screening 1-2 times per year if you're at risk for diabetes or have pre-diabetes Fasting glucose: No results in last 5 years (No results in last 5 years)  A1C: 7.2 (7/19/2024)  Screening Not Indicated  History Diabetes   Cholesterol Screening Once every 5 years if you don't have a lipid disorder. May order more often based on risk factors. Lipid panel: 09/27/2023    Screening Current     Other Preventive Screenings Covered by Medicare:  Abdominal Aortic Aneurysm (AAA) Screening: covered once if your at risk. You're considered to be at risk if you have a family history of AAA.  Lung Cancer Screening: covers low dose CT scan once per year if you meet all of the following conditions: (1) Age 55-77; (2) No signs or symptoms of lung cancer; (3) Current smoker or have quit smoking within the last 15 years; (4) You have a tobacco smoking history of at least 20 pack years (packs per day multiplied by number of years you smoked); (5) You get a written order from a healthcare provider.  Glaucoma Screening: covered annually if you're considered high risk: (1) You have diabetes OR (2) Family history of glaucoma OR (3)  aged 50 and older OR (4)  American aged 65 and older  Osteoporosis Screening: covered every 2 years if you meet one of the following conditions: (1) You're estrogen deficient and at risk for osteoporosis based off medical history and other findings; (2) Have a vertebral abnormality; (3) On glucocorticoid therapy for more than 3 months; (4) Have primary hyperparathyroidism; (5) On osteoporosis medications and need to assess response to drug therapy.   Last bone density test (DXA Scan): Not on file.  HIV Screening: covered annually if you're between the age of 15-65. Also covered annually if you are younger than 15 and older than 65 with risk factors for HIV infection. For pregnant patients, it is covered up to 3 times per  pregnancy.    Immunizations:  Immunization Recommendations   Influenza Vaccine Annual influenza vaccination during flu season is recommended for all persons aged >= 6 months who do not have contraindications   Pneumococcal Vaccine   * Pneumococcal conjugate vaccine = PCV13 (Prevnar 13), PCV15 (Vaxneuvance), PCV20 (Prevnar 20)  * Pneumococcal polysaccharide vaccine = PPSV23 (Pneumovax) Adults 19-65 yo with certain risk factors or if 65+ yo  If never received any pneumonia vaccine: recommend Prevnar 20 (PCV20)  Give PCV20 if previously received 1 dose of PCV13 or PPSV23   Hepatitis B Vaccine 3 dose series if at intermediate or high risk (ex: diabetes, end stage renal disease, liver disease)   Respiratory syncytial virus (RSV) Vaccine - COVERED BY MEDICARE PART D  * RSVPreF3 (Arexvy) CDC recommends that adults 60 years of age and older may receive a single dose of RSV vaccine using shared clinical decision-making (SCDM)   Tetanus (Td) Vaccine - COST NOT COVERED BY MEDICARE PART B Following completion of primary series, a booster dose should be given every 10 years to maintain immunity against tetanus. Td may also be given as tetanus wound prophylaxis.   Tdap Vaccine - COST NOT COVERED BY MEDICARE PART B Recommended at least once for all adults. For pregnant patients, recommended with each pregnancy.   Shingles Vaccine (Shingrix) - COST NOT COVERED BY MEDICARE PART B  2 shot series recommended in those 19 years and older who have or will have weakened immune systems or those 50 years and older     Health Maintenance Due:      Topic Date Due   • Hepatitis C Screening  Completed     Immunizations Due:  There are no preventive care reminders to display for this patient.  Advance Directives   What are advance directives?  Advance directives are legal documents that state your wishes and plans for medical care. These plans are made ahead of time in case you lose your ability to make decisions for yourself. Advance directives  can apply to any medical decision, such as the treatments you want, and if you want to donate organs.   What are the types of advance directives?  There are many types of advance directives, and each state has rules about how to use them. You may choose a combination of any of the following:  Living will:  This is a written record of the treatment you want. You can also choose which treatments you do not want, which to limit, and which to stop at a certain time. This includes surgery, medicine, IV fluid, and tube feedings.   Durable power of  for healthcare (DPAHC):  This is a written record that states who you want to make healthcare choices for you when you are unable to make them for yourself. This person, called a proxy, is usually a family member or a friend. You may choose more than 1 proxy.  Do not resuscitate (DNR) order:  A DNR order is used in case your heart stops beating or you stop breathing. It is a request not to have certain forms of treatment, such as CPR. A DNR order may be included in other types of advance directives.  Medical directive:  This covers the care that you want if you are in a coma, near death, or unable to make decisions for yourself. You can list the treatments you want for each condition. Treatment may include pain medicine, surgery, blood transfusions, dialysis, IV or tube feedings, and a ventilator (breathing machine).  Values history:  This document has questions about your views, beliefs, and how you feel and think about life. This information can help others choose the care that you would choose.  Why are advance directives important?  An advance directive helps you control your care. Although spoken wishes may be used, it is better to have your wishes written down. Spoken wishes can be misunderstood, or not followed. Treatments may be given even if you do not want them. An advance directive may make it easier for your family to make difficult choices about your care.    Urinary Incontinence   Urinary incontinence (UI)  is when you lose control of your bladder. UI develops because your bladder cannot store or empty urine properly. The 3 most common types of UI are stress incontinence, urge incontinence, or both.  Medicines:   May be given to help strengthen your bladder control. Report any side effects of medication to your healthcare provider.  Do pelvic muscle exercises often:  Your pelvic muscles help you stop urinating. Squeeze these muscles tight for 5 seconds, then relax for 5 seconds. Gradually work up to squeezing for 10 seconds. Do 3 sets of 15 repetitions a day, or as directed. This will help strengthen your pelvic muscles and improve bladder control.  Train your bladder:  Go to the bathroom at set times, such as every 2 hours, even if you do not feel the urge to go. You can also try to hold your urine when you feel the urge to go. For example, hold your urine for 5 minutes when you feel the urge to go. As that becomes easier, hold your urine for 10 minutes.   Self-care:   Keep a UI record.  Write down how often you leak urine and how much you leak. Make a note of what you were doing when you leaked urine.  Drink liquids as directed. You may need to limit the amount of liquid you drink to help control your urine leakage. Do not drink any liquid right before you go to bed. Limit or do not have drinks that contain caffeine or alcohol.   Prevent constipation.  Eat a variety of high-fiber foods. Good examples are high-fiber cereals, beans, vegetables, and whole-grain breads. Walking is the best way to trigger your intestines to have a bowel movement.  Exercise regularly and maintain a healthy weight.  Weight loss and exercise will decrease pressure on your bladder and help you control your leakage.   Use a catheter as directed  to help empty your bladder. A catheter is a tiny, plastic tube that is put into your bladder to drain your urine.   Go to behavior therapy as  directed.  Behavior therapy may be used to help you learn to control your urge to urinate.    Weight Management   Why it is important to manage your weight:  Being overweight increases your risk of health conditions such as heart disease, high blood pressure, type 2 diabetes, and certain types of cancer. It can also increase your risk for osteoarthritis, sleep apnea, and other respiratory problems. Aim for a slow, steady weight loss. Even a small amount of weight loss can lower your risk of health problems.  How to lose weight safely:  A safe and healthy way to lose weight is to eat fewer calories and get regular exercise. You can lose up about 1 pound a week by decreasing the number of calories you eat by 500 calories each day.   Healthy meal plan for weight management:  A healthy meal plan includes a variety of foods, contains fewer calories, and helps you stay healthy. A healthy meal plan includes the following:  Eat whole-grain foods more often.  A healthy meal plan should contain fiber. Fiber is the part of grains, fruits, and vegetables that is not broken down by your body. Whole-grain foods are healthy and provide extra fiber in your diet. Some examples of whole-grain foods are whole-wheat breads and pastas, oatmeal, brown rice, and bulgur.  Eat a variety of vegetables every day.  Include dark, leafy greens such as spinach, kale, loly greens, and mustard greens. Eat yellow and orange vegetables such as carrots, sweet potatoes, and winter squash.   Eat a variety of fruits every day.  Choose fresh or canned fruit (canned in its own juice or light syrup) instead of juice. Fruit juice has very little or no fiber.  Eat low-fat dairy foods.  Drink fat-free (skim) milk or 1% milk. Eat fat-free yogurt and low-fat cottage cheese. Try low-fat cheeses such as mozzarella and other reduced-fat cheeses.  Choose meat and other protein foods that are low in fat.  Choose beans or other legumes such as split peas or  lentils. Choose fish, skinless poultry (chicken or turkey), or lean cuts of red meat (beef or pork). Before you cook meat or poultry, cut off any visible fat.   Use less fat and oil.  Try baking foods instead of frying them. Add less fat, such as margarine, sour cream, regular salad dressing and mayonnaise to foods. Eat fewer high-fat foods. Some examples of high-fat foods include french fries, doughnuts, ice cream, and cakes.  Eat fewer sweets.  Limit foods and drinks that are high in sugar. This includes candy, cookies, regular soda, and sweetened drinks.  Exercise:  Exercise at least 30 minutes per day on most days of the week. Some examples of exercise include walking, biking, dancing, and swimming. You can also fit in more physical activity by taking the stairs instead of the elevator or parking farther away from stores. Ask your healthcare provider about the best exercise plan for you.      © Copyright [employer] 2018 Information is for End User's use only and may not be sold, redistributed or otherwise used for commercial purposes. All illustrations and images included in CareNotes® are the copyrighted property of [employer] or [employer]

## 2025-01-24 NOTE — ASSESSMENT & PLAN NOTE
A1c has increased.  Patient no longer on glipizide.   basal - bolus insulin only.  Will increase prandial insulin from 8 to 10 units plus her sliding scale.  Continue 52 units of basal insulin nightly  Lab Results   Component Value Date    HGBA1C 7.8 (A) 01/24/2025       Orders:    POCT hemoglobin A1c    insulin aspart protamine-insulin aspart (NovoLOG 70/30) 100 units/mL injection; Inject 10 Units under the skin 2 (two) times a day before meals Sliding scale >140

## 2025-01-24 NOTE — ASSESSMENT & PLAN NOTE
Controlled on current antihypertensive.  Patient to continue furosemide 20 mg daily, losartan-hydrochlorothiazide 100-25 mg daily and metoprolol 25 mg twice daily

## 2025-01-24 NOTE — PROGRESS NOTES
I identified Tylenol I do not think we have a normal level for the left name: Britni Curran      : 1940      MRN: [id number]  Encounter Provider: Shonna Garnett DO  Encounter Date: 2025   Encounter department: [facility] [place]    Assessment & Plan  Type 2 diabetes mellitus without complication, with long-term current use of insulin (HCC)  A1c has increased.  Patient no longer on glipizide.   basal - bolus insulin only.  Will increase prandial insulin from 8 to 10 units plus her sliding scale.  Continue 52 units of basal insulin nightly  Lab Results   Component Value Date    HGBA1C 7.8 (A) 2025       Orders:    POCT hemoglobin A1c    insulin aspart protamine-insulin aspart (NovoLOG 70/30) 100 units/mL injection; Inject 10 Units under the skin 2 (two) times a day before meals Sliding scale >140    Morbid (severe) obesity due to excess calories (HCC)  Dietary changes discussed with pt.        Oropharyngeal dysphagia  Persistent issue.  Patient's status post barium swallow that was unremarkable.  Reports slight worsening of dysphagia with not episodes of GERD of liquids and solids.  Will refer to GI for possible endoscopy  Orders:    Ambulatory Referral to Gastroenterology; Future    Essential hypertension  Controlled on current antihypertensive.  Patient to continue furosemide 20 mg daily, losartan-hydrochlorothiazide 100-25 mg daily and metoprolol 25 mg twice daily       SK (seborrheic keratosis)  Small lesion noted on right side of the face.  Patient would like to have removed.  Referral for Derm placed  Orders:    Ambulatory Referral to Dermatology; Future    Medicare annual wellness visit, subsequent            Preventive health issues were discussed with patient, and age appropriate screening tests were ordered as noted in patient's After Visit Summary. Personalized health advice and appropriate referrals for health education or preventive services given if  needed, as noted in patient's After Visit Summary.    History of Present Illness       Patient Care Team:  Shonna aGrnett DO as PCP - General (Family Medicine)    Review of Systems   Constitutional:  Negative for fever.   HENT:  Positive for trouble swallowing.    Respiratory:  Negative for shortness of breath.    Musculoskeletal:  Positive for gait problem (Uses rollator).     Medical History Reviewed by provider this encounter:  Tobacco  Allergies  Meds  Problems  Med Hx  Surg Hx  Fam Hx       Annual Wellness Visit Questionnaire   Britni is here for her Subsequent Wellness visit.     Health Risk Assessment:   Patient rates overall health as fair. Patient feels that their physical health rating is slightly worse. Patient is satisfied with their life. Eyesight was rated as slightly worse. Hearing was rated as same. Patient feels that their emotional and mental health rating is much better. Patients states they are never, rarely angry. Patient states they are sometimes unusually tired/fatigued. Pain experienced in the last 7 days has been some. Patient's pain rating has been 4/10. Patient states that she has experienced no weight loss or gain in last 6 months.     Depression Screening:   PHQ-2 Score: 0      Fall Risk Screening:   In the past year, patient has experienced: no history of falling in past year      Urinary Incontinence Screening:   Patient has leaked urine accidently in the last six months. worse    Home Safety:  Patient has trouble with stairs inside or outside of their home. Patient has working smoke alarms and has working carbon monoxide detector. Home safety hazards include: none.     Nutrition:   Current diet is Regular. Moms meal are deliver to home    Medications:   Patient is not currently taking any over-the-counter supplements. Patient is able to manage medications.     Activities of Daily Living (ADLs)/Instrumental Activities of Daily Living (IADLs):   Walk and transfer  into and out of bed and chair?: Yes  Dress and groom yourself?: Yes    Bathe or shower yourself?: Yes    Feed yourself? Yes  Do your laundry/housekeeping?: Yes  Manage your money, pay your bills and track your expenses?: Yes  Make your own meals?: No    Do your own shopping?: Yes    Previous Hospitalizations:   Any hospitalizations or ED visits within the last 12 months?: Yes    How many hospitalizations have you had in the last year?: 1-2    [facility]    Advance Care Planning:   Living will: Yes    Advanced directive: Yes      PREVENTIVE SCREENINGS      Cardiovascular Screening:    General: Screening Current      Diabetes Screening:     General: Screening Not Indicated and History Diabetes      Cervical Cancer Screening:    General: Screening Not Indicated      Lung Cancer Screening:     General: Screening Not Indicated      Hepatitis C Screening:    General: Screening Current    Screening, Brief Intervention, and Referral to Treatment (SBIRT)    Screening  Typical number of drinks in a day: 0  Typical number of drinks in a week: 0  Interpretation: Low risk drinking behavior.    Single Item Drug Screening:  How often have you used an illegal drug (including marijuana) or a prescription medication for non-medical reasons in the past year? never    Single Item Drug Screen Score: 0  Interpretation: Negative screen for possible drug use disorder    Social Drivers of Health     Financial Resource Strain: Low Risk  (12/12/2023)    Overall Financial Resource Strain (CARDIA)     Difficulty of Paying Living Expenses: Not very hard   Food Insecurity: No Food Insecurity (1/24/2025)    Hunger Vital Sign     Worried About Running Out of Food in the Last Year: Never true     Ran Out of Food in the Last Year: Never true   Transportation Needs: No Transportation Needs (1/24/2025)    PRAPARE - Transportation     Lack of Transportation (Medical): No     Lack of Transportation (Non-Medical):  "No   Housing Stability: Low Risk  (1/24/2025)    Housing Stability Vital Sign     Unable to Pay for Housing in the Last Year: No     Number of Times Moved in the Last Year: 0     Homeless in the Last Year: No   Utilities: Not At Risk (1/24/2025)    [facility] Utilities     Threatened with loss of utilities: No     No results found.    Objective   /60 (BP Location: Left arm, Patient Position: Sitting, Cuff Size: Standard)   Pulse 83   Temp 97.5 °F (36.4 °C) (Tympanic)   Resp 16   Ht 5' 1\" (1.549 m)   Wt 104 kg (228 lb 6.4 oz)   SpO2 95%   BMI 43.16 kg/m²     Physical Exam  HENT:      Head: Normocephalic and atraumatic.      Right Ear: External ear normal.      Left Ear: External ear normal.   Eyes:      Conjunctiva/sclera: Conjunctivae normal.   Cardiovascular:      Rate and Rhythm: Normal rate and regular rhythm.   Pulmonary:      Effort: Pulmonary effort is normal.      Breath sounds: Normal breath sounds.   Abdominal:      General: Bowel sounds are normal.      Palpations: Abdomen is soft.   Musculoskeletal:      Right lower leg: No edema.      Left lower leg: No edema.   Skin:     Findings: Lesion (Small SK on the right aspect of her upper cheek) present.   Neurological:      Mental Status: She is alert and oriented to person, place, and time.      Gait: Gait abnormal.         "

## 2025-01-27 ENCOUNTER — TELEPHONE (OUTPATIENT)
Dept: ADMINISTRATIVE | Facility: OTHER | Age: 85
End: 2025-01-27

## 2025-01-27 NOTE — TELEPHONE ENCOUNTER
----- Message from Divya CORTEZ sent at 1/24/2025  4:37 PM EST -----  Regarding: diabetic eye exam  01/24/25 4:37 PM    Hello, our patient Britni Curran has had Diabetic Eye Exam completed/performed. Please assist in updating the patient chart by making an External outreach to [facility] eye facility located in [place]. The date of service is 3 months ago.    Thank you,  Divya Carrillo MA   [facility] [place]

## 2025-01-27 NOTE — TELEPHONE ENCOUNTER
Upon review of the In Basket request and the patient's chart, initial outreach has been made via fax to facility. Please see Contacts section for details.     Thank you  Jhon Guzman MA

## 2025-01-27 NOTE — LETTER
Diabetic Eye Exam Form    Date Requested: 25     Please complete form  Patient: Britni Curran     2nd Request  Patient : 1940   Referring Provider: Shonna Garnett,       DIABETIC Eye Exam Date _______________________________      Type of Exam MUST be documented for Diabetic Eye Exams. Please CHECK ONE.     Retinal Exam       Dilated Retinal Exam       OCT       Optomap-Iris Exam      Fundus Photography       Left Eye - Please check Retinopathy or No Retinopathy        Exam did show retinopathy    Exam did not show retinopathy       Right Eye - Please check Retinopathy or No Retinopathy       Exam did show retinopathy    Exam did not show retinopathy       Comments __________________________________________________________    Practice Providing Exam ______________________________________________    Exam Performed By (print name) _______________________________________      Provider Signature ___________________________________________________      These reports are needed for  compliance.  Please fax this completed form and a copy of the Diabetic Eye Exam report to our office located at [address] [place] [address] as soon as possible via Fax [phone] attention Jhon: Phone [phone]  We thank you for your assistance in treating our mutual patient.

## 2025-01-27 NOTE — LETTER
Diabetic Eye Exam Form    Date Requested: 25  Patient: Britni Curran  Patient : 1940   Referring Provider: Shonna Garnett DO      DIABETIC Eye Exam Date _______________________________      Type of Exam MUST be documented for Diabetic Eye Exams. Please CHECK ONE.     Retinal Exam       Dilated Retinal Exam       OCT       Optomap-Iris Exam      Fundus Photography       Left Eye - Please check Retinopathy or No Retinopathy        Exam did show retinopathy    Exam did not show retinopathy       Right Eye - Please check Retinopathy or No Retinopathy       Exam did show retinopathy    Exam did not show retinopathy       Comments __________________________________________________________    Practice Providing Exam ______________________________________________    Exam Performed By (print name) _______________________________________      Provider Signature ___________________________________________________      These reports are needed for  compliance.  Please fax this completed form and a copy of the Diabetic Eye Exam report to our office located at [address] [place] [address] as soon as possible via Fax [phone] attention Jhon: Phone [phone]  We thank you for your assistance in treating our mutual patient.

## 2025-01-30 NOTE — TELEPHONE ENCOUNTER
As a follow-up, a second attempt has been made for outreach via fax to facility. Please see Contacts section for details.    Thank you  Jhon Guzman MA

## 2025-01-31 NOTE — TELEPHONE ENCOUNTER
Upon review of the In Basket request we were able to locate, review, and update the patient chart as requested for Diabetic Eye Exam.    Any additional questions or concerns should be emailed to the Practice Liaisons via the appropriate education email address, please do not reply via In Basket.    Thank you  Jhon Guzman MA   [facility]

## 2025-02-14 ENCOUNTER — TELEPHONE (OUTPATIENT)
Age: 85
End: 2025-02-14

## 2025-02-14 ENCOUNTER — OFFICE VISIT (OUTPATIENT)
Dept: GASTROENTEROLOGY | Facility: CLINIC | Age: 85
End: 2025-02-14
Payer: MEDICARE

## 2025-02-14 VITALS
OXYGEN SATURATION: 96 % | WEIGHT: 230 LBS | TEMPERATURE: 97.7 F | HEIGHT: 61 IN | DIASTOLIC BLOOD PRESSURE: 75 MMHG | BODY MASS INDEX: 43.43 KG/M2 | SYSTOLIC BLOOD PRESSURE: 132 MMHG | HEART RATE: 85 BPM

## 2025-02-14 DIAGNOSIS — K57.90 DIVERTICULOSIS: Primary | ICD-10-CM

## 2025-02-14 DIAGNOSIS — K59.09 CHRONIC CONSTIPATION: ICD-10-CM

## 2025-02-14 DIAGNOSIS — R13.12 OROPHARYNGEAL DYSPHAGIA: ICD-10-CM

## 2025-02-14 DIAGNOSIS — Z83.719 FAMILY HISTORY OF COLONIC POLYPS: ICD-10-CM

## 2025-02-14 PROCEDURE — 99214 OFFICE O/P EST MOD 30 MIN: CPT | Performed by: INTERNAL MEDICINE

## 2025-02-14 NOTE — TELEPHONE ENCOUNTER
Patients GI provider:  Dr. Valentine     Number to return call: [phone]    Reason for call: Pt calling to reschedule EGD from 2/25/25    Scheduled procedure/appointment date if applicable: 3/6/25

## 2025-02-14 NOTE — PATIENT INSTRUCTIONS
Scheduled date of EGD(as of today):2/25/25  Physician performing EGD:Serge  Location of EGD:[place]  Instructions reviewed with patient by:Alex dunbar  Clearances:  none

## 2025-02-16 RX ORDER — SODIUM CHLORIDE, SODIUM LACTATE, POTASSIUM CHLORIDE, CALCIUM CHLORIDE 600; 310; 30; 20 MG/100ML; MG/100ML; MG/100ML; MG/100ML
125 INJECTION, SOLUTION INTRAVENOUS CONTINUOUS
OUTPATIENT
Start: 2025-02-16

## 2025-02-16 NOTE — ASSESSMENT & PLAN NOTE
High-fiber diet to include fruits, vegetable, and whole-grain foods, daily  Increase fiber intake using supplements twice daily

## 2025-02-16 NOTE — PROGRESS NOTES
Name: Britni Curran      : 1940      MRN: [id number]  Encounter Provider: Ld Valentine DO  Encounter Date: 2025   Encounter department: [facility] [place]  :  Assessment & Plan  Oropharyngeal dysphagia    Orders:    Ambulatory Referral to Gastroenterology    EGD; Future  *I recommended discontinuing Carafate as this is a poor medical management strategy for dysphagia or gastroesophageal reflux disease  Diverticulosis  High-fiber diet to include fruits, vegetable, and whole-grain foods, daily    Chronic constipation  High-fiber diet to include fruits, vegetable, and whole-grain foods, daily  Increase fiber intake using supplements twice daily    Family history of colonic polyps  No additional colonoscopies based on the patient's age      History of Present Illness     Britni Curran is a 84 y.o. female who presents with a complaint of excess phlegm associated with eating as well as with drinking.  It appears to get caught in the throat area and results in a cough.  We discussed how her increased phlegm problem could be related to pulmonary problems, sinus or postnasal drip problems, or gastroesophageal reflux disease.  She states that this phlegm related problem with associated cough sometimes occurs on a daily basis.  She is also complaining of constipation which is chronic.  She denies any heartburn, odynophagia but she does admit to dysphagia.  She states that she is taking Carafate 3 times a day which was started when she was hospitalized in .  She also admits to gaseousness and bloating.    Her last colonoscopy performed 2023 demonstrated diverticulosis that was pancolonic in addition to a polyp in the proximal transverse colon and a polyp in the rectum.  Both polyps were tubular adenomas.  Based on the patient's age, there is no additional plans for surveillance or screening colonoscopy.           Objective   /75 (BP Location: Right  "arm, Patient Position: Sitting, Cuff Size: Large)   Pulse 85   Temp 97.7 °F (36.5 °C) (Tympanic)   Ht 5' 1\" (1.549 m)   Wt 104 kg (230 lb)   SpO2 96%   BMI 43.46 kg/m²      Physical Exam      "

## 2025-02-26 DIAGNOSIS — I73.9 PVD (PERIPHERAL VASCULAR DISEASE) (HCC): ICD-10-CM

## 2025-02-26 RX ORDER — CILOSTAZOL 100 MG/1
100 TABLET ORAL 2 TIMES DAILY
Qty: 180 TABLET | Refills: 1 | Status: SHIPPED | OUTPATIENT
Start: 2025-02-26

## 2025-02-28 ENCOUNTER — TELEMEDICINE (OUTPATIENT)
Age: 85
End: 2025-02-28
Payer: MEDICARE

## 2025-02-28 DIAGNOSIS — G47.33 OBSTRUCTIVE SLEEP APNEA SYNDROME: ICD-10-CM

## 2025-02-28 DIAGNOSIS — R06.01 ORTHOPNEA: Primary | ICD-10-CM

## 2025-02-28 PROCEDURE — 99214 OFFICE O/P EST MOD 30 MIN: CPT | Performed by: FAMILY MEDICINE

## 2025-02-28 PROCEDURE — G2211 COMPLEX E/M VISIT ADD ON: HCPCS | Performed by: FAMILY MEDICINE

## 2025-02-28 NOTE — PROGRESS NOTES
Virtual Regular VisitName: Britni Curran      : 1940      MRN: [id number]  Encounter Provider: Shonna Garnett DO  Encounter Date: 2025   Encounter department: [facility] [place]  :  Assessment & Plan  Orthopnea         Obstructive sleep apnea syndrome         Pt encouarged to take her loop diarectic as prescribed. Monitor for wrsening dyspnea and presetn to ER if occurs. Alos encoaurged to disccuss need for poassible retiration of CPAP with sleep medicine.       History of Present Illness     Pt prestns c/o episode of shortness of breath. Occurs last night while sleep. Unabl eto get comfortable due ot inability  to breathe even with CPAP on       Review of Systems   Constitutional:  Negative for fever.   HENT:  Positive for congestion.    Respiratory:  Positive for shortness of breath.    Cardiovascular:  Positive for leg swelling.   Gastrointestinal:  Positive for diarrhea.       Objective   There were no vitals taken for this visit.    Physical Exam  Constitutional:       Appearance: She is not ill-appearing or toxic-appearing.   HENT:      Head: Normocephalic.   Pulmonary:      Effort: Pulmonary effort is normal.      Comments: No conversational dyspnea   Neurological:      Mental Status: She is alert and oriented to person, place, and time.   Psychiatric:         Mood and Affect: Mood normal.         Behavior: Behavior normal.         Administrative Statements   Encounter provider Shonna Garnett DO    The Patient is located at Home and in the following state in which I hold an active license [place].    The patient was identified by name and date of birth. Britni Curran was informed that this is a telemedicine visit and that the visit is being conducted through the Epic Embedded platform. She agrees to proceed..  My office door was closed. No one else was in the room.  She acknowledged consent and understanding of privacy and security of the video platform.  The patient has agreed to participate and understands they can discontinue the visit at any time.    I have spent a total time of 9 minutes in caring for this patient on the day of the visit/encounter including Instructions for management, Importance of tx compliance, Counseling / Coordination of care, and Reviewing/placing orders in the medical record (including tests, medications, and/or procedures).

## 2025-02-28 NOTE — ASSESSMENT & PLAN NOTE
Pt encouarged to take her loop diarectic as prescribed. Monitor for wrsening dyspnea and presetn to ER if occurs. Alos encoaurged to disccuss need for poassible retiration of CPAP with sleep medicine.

## 2025-03-04 DIAGNOSIS — I10 ESSENTIAL HYPERTENSION: ICD-10-CM

## 2025-03-04 RX ORDER — FUROSEMIDE 20 MG/1
20 TABLET ORAL 2 TIMES DAILY
Qty: 180 TABLET | Refills: 1 | Status: SHIPPED | OUTPATIENT
Start: 2025-03-04

## 2025-03-04 RX ORDER — METOPROLOL TARTRATE 25 MG/1
25 TABLET, FILM COATED ORAL 2 TIMES DAILY
Qty: 180 TABLET | Refills: 1 | Status: SHIPPED | OUTPATIENT
Start: 2025-03-04

## 2025-03-04 NOTE — TELEPHONE ENCOUNTER
Reason for call:   [x] Refill   [] Prior Auth  [] Other:     Office: [facility]  [x] PCP/Provider - Shonna Garnett   [] Specialty/Provider -     Medication: metoprolol tartrate, furosemide     Dose/Frequency: 25 mg, 20 mg/ twice daily     Quantity: 90 day supply     Pharmacy: [facility] in [place]     Does the patient have enough for 3 days?   [] Yes   [x] No - Send as HP to POD

## 2025-03-06 ENCOUNTER — HOSPITAL ENCOUNTER (OUTPATIENT)
Dept: GASTROENTEROLOGY | Facility: HOSPITAL | Age: 85
Setting detail: OUTPATIENT SURGERY
End: 2025-03-06
Attending: INTERNAL MEDICINE
Payer: MEDICARE

## 2025-03-06 ENCOUNTER — ANESTHESIA (OUTPATIENT)
Dept: GASTROENTEROLOGY | Facility: HOSPITAL | Age: 85
End: 2025-03-06

## 2025-03-06 ENCOUNTER — ANESTHESIA EVENT (OUTPATIENT)
Dept: GASTROENTEROLOGY | Facility: HOSPITAL | Age: 85
End: 2025-03-06

## 2025-03-06 VITALS
SYSTOLIC BLOOD PRESSURE: 166 MMHG | HEIGHT: 60 IN | RESPIRATION RATE: 14 BRPM | OXYGEN SATURATION: 97 % | BODY MASS INDEX: 44.62 KG/M2 | HEART RATE: 87 BPM | WEIGHT: 227.29 LBS | DIASTOLIC BLOOD PRESSURE: 69 MMHG | TEMPERATURE: 97.5 F

## 2025-03-06 DIAGNOSIS — R13.12 OROPHARYNGEAL DYSPHAGIA: ICD-10-CM

## 2025-03-06 DIAGNOSIS — R09.89 RALES: Primary | ICD-10-CM

## 2025-03-06 LAB — GLUCOSE SERPL-MCNC: 179 MG/DL (ref 65–140)

## 2025-03-06 PROCEDURE — 82948 REAGENT STRIP/BLOOD GLUCOSE: CPT

## 2025-03-06 RX ORDER — SODIUM CHLORIDE, SODIUM LACTATE, POTASSIUM CHLORIDE, CALCIUM CHLORIDE 600; 310; 30; 20 MG/100ML; MG/100ML; MG/100ML; MG/100ML
125 INJECTION, SOLUTION INTRAVENOUS CONTINUOUS
Status: DISCONTINUED | OUTPATIENT
Start: 2025-03-06 | End: 2025-03-10 | Stop reason: HOSPADM

## 2025-03-06 RX ADMIN — SODIUM CHLORIDE, SODIUM LACTATE, POTASSIUM CHLORIDE, AND CALCIUM CHLORIDE 125 ML/HR: .6; .31; .03; .02 INJECTION, SOLUTION INTRAVENOUS at 07:19

## 2025-03-06 NOTE — PERIOPERATIVE NURSING NOTE
Pt stated in pre-op that she was having increased SOB since Friday. Pt states that she had a tele-med appointment with her doctor and was told to start oral diuretics. Pt has not taken any since Monday morning. Pt states she wakes up in the middle of the night not able to breathe, even with her CPAP use. Pt cannot get dressed without being short of breath. Anesthesia made aware. Pt cancelled in pre op.

## 2025-03-06 NOTE — ANESTHESIA PREPROCEDURE EVALUATION
Procedure:  EGD    Relevant Problems   CARDIO   (+) Coronary artery disease involving coronary bypass graft of native heart without angina pectoris   (+) Essential hypertension      ENDO   (+) Type 2 diabetes mellitus without complication, with long-term current use of insulin (HCC)      PULMONARY   (+) Sleep apnea      Surgery/Wound/Pain   (+) Hx of CABG      FEN/Gastrointestinal   (+) Chronic constipation      Other   (+) Body mass index (BMI) of 40.0 to 44.9 in adult (HCC)   (+) Morbid obesity (HCC)                     Recently (2/28) seen remotely (tele-med) via Dr Garnett for [place]/Orthopnea and started on diuretics.  Symptoms relatively unchanged but lower extremity edema improved.  Spo2 88-90% on room air with rales on exam.      Think risk of elective EGD is prohibitive until euvolemic. Continue PO diuretics.  Encouraged outpt follow up with Dr Garnett.  Discussed with Dr Valentine, in agreement.     Jaya Dimas DO

## 2025-03-06 NOTE — H&P
History and Physical -  [facility]  Britni Curran 84 y.o. female MRN: [id number]      HPI: Britni Curran is a 84 y.o. year old female who presents for dysphagia      REVIEW OF SYSTEMS: Per the HPI, and otherwise unremarkable.    Historical Information   Past Medical History:   Diagnosis Date    CPAP (continuous positive airway pressure) dependence     Diabetes mellitus (HCC)     Hyperlipidemia     Hypertension     Sleep apnea      Past Surgical History:   Procedure Laterality Date    CARDIAC SURGERY      triple bypass surgery 2019    CHOLECYSTECTOMY      COLONOSCOPY       Social History   Social History     Substance and Sexual Activity   Alcohol Use Not Currently    Alcohol/week: 1.0 standard drink of alcohol    Types: 1 Glasses of wine per week    Comment: rarely      Social History     Substance and Sexual Activity   Drug Use Never     Social History     Tobacco Use   Smoking Status Never   Smokeless Tobacco Never     Family History   Problem Relation Age of Onset    Heart disease Mother     Diabetes Mother     Hypertension Mother     Heart disease Father     Hypertension Father     Diabetes Father        Meds/Allergies     Not in a hospital admission.    Allergies   Allergen Reactions    Keflex [Cephalexin] Hives    Penicillins Rash    Atorvastatin Other (See Comments)     Other reaction(s): Muscle pain  Other reaction(s): Muscle pain      Metformin Diarrhea     Severe diarrhea  Severe diarrhea         Objective     Blood pressure 166/69, pulse 87, temperature 97.5 °F (36.4 °C), temperature source Temporal, resp. rate 14, height 5' (1.524 m), weight 103 kg (227 lb 4.7 oz), SpO2 97%.      PHYSICAL EXAM    Gen: NAD  CV: RRR  CHEST: Clear  ABD: soft, NT/ND  EXT: no edema      ASSESSMENT/PLAN:  This is a 84 y.o. year old female here for EGD, and she is stable and optimized for her procedure.

## 2025-03-12 ENCOUNTER — OFFICE VISIT (OUTPATIENT)
Age: 85
End: 2025-03-12
Payer: MEDICARE

## 2025-03-12 VITALS
BODY MASS INDEX: 44.92 KG/M2 | DIASTOLIC BLOOD PRESSURE: 60 MMHG | TEMPERATURE: 97.9 F | RESPIRATION RATE: 18 BRPM | HEIGHT: 60 IN | WEIGHT: 228.8 LBS | HEART RATE: 84 BPM | SYSTOLIC BLOOD PRESSURE: 130 MMHG | OXYGEN SATURATION: 95 %

## 2025-03-12 DIAGNOSIS — I25.810 CORONARY ARTERY DISEASE INVOLVING CORONARY BYPASS GRAFT OF NATIVE HEART WITHOUT ANGINA PECTORIS: ICD-10-CM

## 2025-03-12 DIAGNOSIS — R60.0 BILATERAL LOWER EXTREMITY EDEMA: Primary | ICD-10-CM

## 2025-03-12 DIAGNOSIS — J30.9 CHRONIC ALLERGIC RHINITIS: ICD-10-CM

## 2025-03-12 PROCEDURE — 99214 OFFICE O/P EST MOD 30 MIN: CPT | Performed by: FAMILY MEDICINE

## 2025-03-12 PROCEDURE — G2211 COMPLEX E/M VISIT ADD ON: HCPCS | Performed by: FAMILY MEDICINE

## 2025-03-12 RX ORDER — HYDROXYZINE HYDROCHLORIDE 50 MG/1
50 TABLET, FILM COATED ORAL
Qty: 30 TABLET | Refills: 0 | Status: SHIPPED | OUTPATIENT
Start: 2025-03-12

## 2025-03-12 NOTE — PROGRESS NOTES
Name: Britni Curran      : 1940      MRN: [id number]  Encounter Provider: Shonna Garnett DO  Encounter Date: 3/12/2025   Encounter department: [facility] [place]  :  Assessment & Plan  Bilateral lower extremity edema  Slow resolution with incomplete dosing diuretic therapy.  Chest x-ray to evaluate for decompensating heart failure negative for pulmonary edema.  Patient is encouraged to take her Lasix 20 mg twice daily like prescribed.  Previously she is only taking half of a 20 mg tablet.  Coronary artery disease involving coronary bypass graft of native heart without angina pectoris  Asymptomatic she is on beta-blocker therapy and statin.       Chronic allergic rhinitis  Worsened night.  Will trial nighttime antihistamine.  Likely PND contributing.  Recommend patient using her nasal spray at bedtime as well  Orders:    hydrOXYzine HCL (ATARAX) 50 mg tablet; Take 1 tablet (50 mg total) by mouth daily at bedtime           History of Present Illness   C/p swelling and orthopnea on Friday.   Cxr completed.   Was only taking half of her lasix so over the weekend took full 20mg tablet.  Since her breathing has improved  She is suppose to take 20mg twice a day       Review of Systems   Constitutional:  Negative for fever.       Objective   /60 (BP Location: Left arm, Patient Position: Sitting, Cuff Size: Standard)   Pulse 84   Temp 97.9 °F (36.6 °C) (Tympanic)   Resp 18   Ht 5' (1.524 m)   Wt 104 kg (228 lb 12.8 oz)   SpO2 95%   BMI 44.68 kg/m²      Physical Exam  HENT:      Head: Normocephalic.   Cardiovascular:      Rate and Rhythm: Normal rate and regular rhythm.   Pulmonary:      Effort: Pulmonary effort is normal.      Breath sounds: Normal breath sounds.   Musculoskeletal:      Right lower leg: Edema present.      Left lower leg: Edema present.   Neurological:      Mental Status: She is alert and oriented to person, place, and time.      Gait: Gait abnormal.    Psychiatric:         Mood and Affect: Mood normal.         Behavior: Behavior normal.

## 2025-03-13 ENCOUNTER — OFFICE VISIT (OUTPATIENT)
Dept: CARDIOLOGY CLINIC | Facility: CLINIC | Age: 85
End: 2025-03-13
Payer: MEDICARE

## 2025-03-13 VITALS
DIASTOLIC BLOOD PRESSURE: 68 MMHG | WEIGHT: 226 LBS | SYSTOLIC BLOOD PRESSURE: 110 MMHG | RESPIRATION RATE: 16 BRPM | BODY MASS INDEX: 44.14 KG/M2

## 2025-03-13 DIAGNOSIS — I73.9 PERIPHERAL ARTERIAL DISEASE (HCC): Primary | ICD-10-CM

## 2025-03-13 DIAGNOSIS — R06.09 OTHER FORM OF DYSPNEA: ICD-10-CM

## 2025-03-13 DIAGNOSIS — E78.2 MODERATE MIXED HYPERLIPIDEMIA NOT REQUIRING STATIN THERAPY: ICD-10-CM

## 2025-03-13 DIAGNOSIS — I10 ESSENTIAL HYPERTENSION: ICD-10-CM

## 2025-03-13 DIAGNOSIS — Z95.1 HX OF CABG: ICD-10-CM

## 2025-03-13 PROCEDURE — 99204 OFFICE O/P NEW MOD 45 MIN: CPT | Performed by: STUDENT IN AN ORGANIZED HEALTH CARE EDUCATION/TRAINING PROGRAM

## 2025-03-13 PROCEDURE — 93000 ELECTROCARDIOGRAM COMPLETE: CPT | Performed by: STUDENT IN AN ORGANIZED HEALTH CARE EDUCATION/TRAINING PROGRAM

## 2025-03-13 RX ORDER — ROSUVASTATIN CALCIUM 40 MG/1
40 TABLET, COATED ORAL DAILY
Qty: 30 TABLET | Refills: 3 | Status: SHIPPED | OUTPATIENT
Start: 2025-03-13

## 2025-03-13 NOTE — PROGRESS NOTES
[facility] Cardiology  Office Consultation  Britni Curran 84 y.o. female MRN: [id number]        1. Peripheral arterial disease (HCC)  -     POCT ECG  -     Ambulatory Referral to Vascular Surgery; Future  2. Moderate mixed hyperlipidemia not requiring statin therapy  -     Echo complete w/ contrast if indicated; Future; Expected date: 03/13/2025  -     rosuvastatin (CRESTOR) 40 MG tablet; Take 1 tablet (40 mg total) by mouth daily  -     Lipid Panel with Direct LDL reflex; Future; Expected date: 06/13/2025  3. Other form of dyspnea  -     NM myocardial perfusion spect (rx stress and/or rest); Future; Expected date: 03/13/2025  4. Essential hypertension  5. Hx of CABG      Assessment & Plan  Peripheral arterial disease (HCC)  Patient with history of significant peripheral arterial disease she reports since late 2000 early 2010s, previously followed with a vascular surgeon in the [place] area.  Discussed with the patient and we will see if we can obtain records and give referral to our vascular surgery team.  Patient does have bilateral lower extremity edema which reports is chronic she did previously follow with vascular for therapies regarding edema as well  Other form of dyspnea  Patient with morbid obesity and deconditioning, reports has been eroding walker secondary to peripheral arterial disease for quite some time.  She does report that over the past few months has noticed significant decrease in her exertional tolerance, given her multiple risk factors this seems to be an anginal equivalent we will obtain further evaluation with nuclear stress testing and echocardiogram  Essential hypertension  Patient's blood pressure currently well-controlled with current regimen, will continue with current medications.  Hx of CABG  Patient with history of bypass in 2019, currently on aspirin, statin, beta-blocker.  Recent lipid panel showed hypertriglyceridemia and elevated LDL, given coronary disease and peripheral  arterial disease will increase patient's statin and reassess lipid panel in 3 to 6 months-this can be done by me or her PCP.           Reason for Consult / Principal Problem: Establish care     HPI: Britni Curran is a 84 y.o. year old femaleFrom the medical record patient has a history of CABG in 2019 status post LIMA to LAD, vein graft to OM/RCA with a normal LVEF, PAD, FILIBERTO, type 2 diabetes    EKG today shows NSR with right bundle branch block similar to EKG from 4/2023    Recent blood work reviewed sodium of 139, potassium 4.4, BUN 14, creatinine of 0.6, hemoglobin A1c of 7.4    Recent lipid panel   , cholesterol 166, HDL 42,      Currently denies any fever, chills, fatigue, new visual changes, lightheadedness, syncope, chest pain, palpitations, shortness of breath, orthopnea, PND, nausea, vomiting, diarrhea, dark or bright red blood in stools, lower extremity swelling,    The patient reports that over the past few months has noticed that she had shortness of breath with minimal exertion, although she has a poor functional baseline she reports that this is a bit worse than before.  She denies any chest pain, palpitation, dizziness, lightheadedness associate with the symptoms.    3/4/21      ROS: Pertinent positives and negatives as described in History of Present Illness.     Pertinent data     Laboratory Studies:    Laboratory studies personally reviewed    Cardiac testing:     EKG reviewed personally:   EKG shows SR with RBBB today     Review of Systems      Past Medical History:   Diagnosis Date    Coronary artery disease 3/2019    triple bipass surgery    CPAP (continuous positive airway pressure) dependence     Diabetes mellitus (HCC)     Hyperlipidemia     Hypertension     Peripheral artery disease (HCC) 2018    Sleep apnea      Past Surgical History:   Procedure Laterality Date    CARDIAC SURGERY      triple bypass surgery 2019    CHOLECYSTECTOMY      COLONOSCOPY      CORONARY ARTERY  BYPASS GRAFT       Social History     Substance and Sexual Activity   Alcohol Use Not Currently    Alcohol/week: 1.0 standard drink of alcohol    Types: 1 Glasses of wine per week    Comment: rarely      Social History     Substance and Sexual Activity   Drug Use Never     Social History     Tobacco Use   Smoking Status Never   Smokeless Tobacco Never     Family History   Problem Relation Age of Onset    Heart disease Mother     Diabetes Mother     Hypertension Mother     Heart disease Father     Hypertension Father     Diabetes Father     Heart failure Brother          of heart failure       Allergies:  Allergies   Allergen Reactions    Keflex [Cephalexin] Hives    Penicillins Rash    Atorvastatin Other (See Comments)     Other reaction(s): Muscle pain  Other reaction(s): Muscle pain      Metformin Diarrhea     Severe diarrhea  Severe diarrhea         Medications:     Current Outpatient Medications:     Accu-Chek FastClix Lancets MISC, Use, Disp: , Rfl:     amLODIPine (NORVASC) 5 mg tablet, Take 5 mg by mouth daily, Disp: , Rfl:     ascorbic Acid (VITAMIN C) 500 MG CPCR, capsule, extended release: 500 mg 1 capsule twice a day by mouth, Disp: , Rfl:     B Complex-Biotin-FA (HM Vitamin B100 Complex) TABS, Take by mouth, Disp: , Rfl:     B-D ULTRAFINE III SHORT PEN 31G X 8 MM MISC, 4 (four) times a day, Disp: , Rfl:     Blood Glucose Monitoring Suppl (Accu-Chek Guide) w/Device KIT, Use, Disp: , Rfl:     cilostazol (PLETAL) 100 mg tablet, TAKE 1 TABLET BY MOUTH TWICE A DAY, Disp: 180 tablet, Rfl: 1    furosemide (LASIX) 20 mg tablet, Take 1 tablet (20 mg total) by mouth 2 (two) times a day (Patient taking differently: Take 20 mg by mouth daily), Disp: 180 tablet, Rfl: 1    glucose blood (Accu-Chek Guide) test strip, , Disp: , Rfl:     hydrOXYzine HCL (ATARAX) 50 mg tablet, Take 1 tablet (50 mg total) by mouth daily at bedtime, Disp: 30 tablet, Rfl: 0    insulin aspart (NovoLOG FlexPen ReliOn) 100 UNIT/ML injection  pen, As directed UP TP 75 UNITS PER DAYAs directed UP TP 75 UNITS PER DAY, Disp: , Rfl:     insulin degludec (Tresiba FlexTouch) 100 units/mL injection pen, Inject 52 Units under the skin daily at bedtime, Disp: , Rfl:     ipratropium (ATROVENT) 0.06 % nasal spray, , Disp: , Rfl:     losartan-hydrochlorothiazide (HYZAAR) 100-25 MG per tablet, Take 1 tablet by mouth daily, Disp: , Rfl:     magnesium hydroxide (MILK OF MAGNESIA) 400 mg/5 mL oral suspension, Take by mouth daily as needed, Disp: , Rfl:     meclizine (ANTIVERT) 25 mg tablet, Take by mouth 3 (three) times a day as needed for dizziness, Disp: , Rfl:     metoprolol tartrate (LOPRESSOR) 25 mg tablet, TAKE 1 TABLET BY MOUTH TWICE A DAY (Patient taking differently: Take 25 mg by mouth in the morning), Disp: 180 tablet, Rfl: 1    nystatin (MYCOSTATIN) cream, APPLY TO AFFECTED AREA TWICE A DAY, Disp: 30 g, Rfl: 0    ondansetron (ZOFRAN-ODT) 4 mg disintegrating tablet, TAKE 1 TABLET (4 MG TOTAL) BY MOUTH EVERY 6 (SIX) HOURS AS NEEDED FOR NAUSEA OR VOMITING, Disp: 20 tablet, Rfl: 0    Probiotic Product (Align) 4 MG CAPS, Take 1 capsule by mouth, Disp: , Rfl:     Restasis 0.05 % ophthalmic emulsion, INSTILL 1 DROP INTO BOTH EYES TWICE A DAY, Disp: , Rfl:     rosuvastatin (CRESTOR) 40 MG tablet, Take 1 tablet (40 mg total) by mouth daily, Disp: 30 tablet, Rfl: 3    simethicone (MYLICON) 80 mg chewable tablet, Chew 80 mg, Disp: , Rfl:     sucralfate (CARAFATE) 1 g tablet, TAKE 1 TABLET BY ORAL ROUTE 4 TIMES EVERY DAY ON AN EMPTY STOMACH 1 HOUR BEFORE MEALS AND AT BEDTIME, Disp: 100 tablet, Rfl: 21    montelukast (SINGULAIR) 10 mg tablet, TAKE 1 TABLET BY MOUTH EVERYDAY AT BEDTIME (Patient not taking: Reported on 1/24/2025), Disp: 90 tablet, Rfl: 1      Vitals:    03/13/25 1441   BP: 110/68   Resp: 16     Weight (last 2 days)       Date/Time Weight    03/13/25 1441 103 (226)          Physical Exam  Vitals and nursing note reviewed.   Constitutional:       Appearance:  "She is obese.   Cardiovascular:      Rate and Rhythm: Normal rate and regular rhythm.   Pulmonary:      Effort: Pulmonary effort is normal.      Breath sounds: Normal breath sounds.   Musculoskeletal:      Right lower leg: Edema present.      Left lower leg: Edema present.   Neurological:      Mental Status: She is alert.                 Boone Moss MD    Portions of the record may have been created with voice recognition software.  Occasional wrong word or \"sound a like\" substitutions may have occurred due to the inherent limitations of voice recognition software.  Read the chart carefully and recognize, using context, where substitutions have occurred.   "

## 2025-03-13 NOTE — PATIENT INSTRUCTIONS
1) Increase rosuvastatin   2) echocardiogram and nuclear stress test  3) Obtain results of vascular studies done in past, I have given you a referral   4) Repeat

## 2025-03-13 NOTE — ASSESSMENT & PLAN NOTE
Patient's blood pressure currently well-controlled with current regimen, will continue with current medications.

## 2025-03-13 NOTE — ASSESSMENT & PLAN NOTE
Patient with history of bypass in 2019, currently on aspirin, statin, beta-blocker.  Recent lipid panel showed hypertriglyceridemia and elevated LDL, given coronary disease and peripheral arterial disease will increase patient's statin and reassess lipid panel in 3 to 6 months-this can be done by me or her PCP.

## 2025-03-16 DIAGNOSIS — I10 ESSENTIAL HYPERTENSION: ICD-10-CM

## 2025-03-17 RX ORDER — SUCRALFATE 1 G/1
1 TABLET ORAL
Qty: 100 TABLET | Refills: 21 | Status: SHIPPED | OUTPATIENT
Start: 2025-03-17

## 2025-03-20 ENCOUNTER — TELEPHONE (OUTPATIENT)
Age: 85
End: 2025-03-20

## 2025-03-20 NOTE — TELEPHONE ENCOUNTER
Caller: Britni Curran    Doctor: Dr. Moss    Reason for call: pt is scheduled for nuclear stress test on 4/23. She wants to know if Dr. Moss thinks she should have it done sooner  Please advise pt.    Call back#: [phone]

## 2025-04-03 DIAGNOSIS — J30.9 CHRONIC ALLERGIC RHINITIS: ICD-10-CM

## 2025-04-04 RX ORDER — HYDROXYZINE HYDROCHLORIDE 50 MG/1
50 TABLET, FILM COATED ORAL
Qty: 90 TABLET | Refills: 1 | Status: SHIPPED | OUTPATIENT
Start: 2025-04-04

## 2025-04-07 DIAGNOSIS — K59.09 CHRONIC CONSTIPATION: ICD-10-CM

## 2025-04-07 DIAGNOSIS — R11.0 NAUSEA: ICD-10-CM

## 2025-04-07 NOTE — TELEPHONE ENCOUNTER
Reason for call:   [x] Refill   [] Prior Auth  [] Other:     Office:   [x] PCP/Provider - Shonna Garnett  [] Specialty/Provider -     Medication: Ondansetron ODT    Dose/Frequency: 4 mg Q 6 HRS PRN    Quantity: 20    Pharmacy: [facility] [place] [address]    Local Pharmacy   Does the patient have enough for 3 days?   [x] Yes   [] No - Send as HP to POD    Mail Away Pharmacy   Does the patient have enough for 10 days?   [] Yes   [] No - Send as HP to POD

## 2025-04-08 DIAGNOSIS — E78.2 MODERATE MIXED HYPERLIPIDEMIA NOT REQUIRING STATIN THERAPY: ICD-10-CM

## 2025-04-08 RX ORDER — ROSUVASTATIN CALCIUM 40 MG/1
40 TABLET, COATED ORAL DAILY
Qty: 90 TABLET | Refills: 1 | Status: SHIPPED | OUTPATIENT
Start: 2025-04-08

## 2025-04-08 RX ORDER — ONDANSETRON 4 MG/1
4 TABLET, ORALLY DISINTEGRATING ORAL EVERY 6 HOURS PRN
Qty: 20 TABLET | Refills: 2 | Status: SHIPPED | OUTPATIENT
Start: 2025-04-08

## 2025-04-15 ENCOUNTER — TELEPHONE (OUTPATIENT)
Age: 85
End: 2025-04-15

## 2025-04-15 NOTE — TELEPHONE ENCOUNTER
Patient is scheduled for a pharmacological stress test on 4/23.    She is also scheduled for steroid injections in her knees on 4/18 with her orthopedic doctor.    Patient states the steroid injections raise her blood sugar for about a week afterwards.  She asked if this will complicate the stress test - if her blood sugar is elevated at the time of the stress test.     Please advise.

## 2025-04-23 ENCOUNTER — HOSPITAL ENCOUNTER (OUTPATIENT)
Dept: NON INVASIVE DIAGNOSTICS | Facility: CLINIC | Age: 85
Discharge: HOME | End: 2025-04-23
Payer: MEDICARE

## 2025-04-23 ENCOUNTER — HOSPITAL ENCOUNTER (OUTPATIENT)
Dept: NON INVASIVE DIAGNOSTICS | Facility: CLINIC | Age: 85
End: 2025-04-23
Payer: MEDICARE

## 2025-04-23 ENCOUNTER — RESULTS FOLLOW-UP (OUTPATIENT)
Dept: CARDIOLOGY CLINIC | Facility: CLINIC | Age: 85
End: 2025-04-23

## 2025-04-23 VITALS
WEIGHT: 226 LBS | SYSTOLIC BLOOD PRESSURE: 110 MMHG | HEIGHT: 60 IN | DIASTOLIC BLOOD PRESSURE: 68 MMHG | BODY MASS INDEX: 44.37 KG/M2 | HEART RATE: 85 BPM

## 2025-04-23 DIAGNOSIS — E78.2 MODERATE MIXED HYPERLIPIDEMIA NOT REQUIRING STATIN THERAPY: ICD-10-CM

## 2025-04-23 DIAGNOSIS — R06.09 OTHER FORM OF DYSPNEA: ICD-10-CM

## 2025-04-23 LAB
AORTIC ROOT: 3.1 CM
E WAVE DECELERATION TIME: 197 MS
E/A RATIO: 0.63
FRACTIONAL SHORTENING: 42 (ref 28–44)
INTERVENTRICULAR SEPTUM IN DIASTOLE (PARASTERNAL SHORT AXIS VIEW): 0.8 CM
INTERVENTRICULAR SEPTUM: 0.8 CM (ref 0.6–1.1)
LAAS-AP2: 15.4 CM2
LAAS-AP4: 15.9 CM2
LEFT ATRIUM SIZE: 3.2 CM
LEFT ATRIUM VOLUME (MOD BIPLANE): 40 ML
LEFT ATRIUM VOLUME INDEX (MOD BIPLANE): 20.3 ML/M2
LEFT INTERNAL DIMENSION IN SYSTOLE: 2.1 CM (ref 2.1–4)
LEFT VENTRICULAR INTERNAL DIMENSION IN DIASTOLE: 3.6 CM (ref 3.5–6)
LEFT VENTRICULAR POSTERIOR WALL IN END DIASTOLE: 0.9 CM
LEFT VENTRICULAR STROKE VOLUME: 41 ML
LV EF US.2D.A4C+ESTIMATED: 70 %
LVSV (TEICH): 41 ML
MV E'TISSUE VEL-SEP: 9 CM/S
MV PEAK A VEL: 1.21 M/S
MV PEAK E VEL: 76 CM/S
MV STENOSIS PRESSURE HALF TIME: 57 MS
MV VALVE AREA P 1/2 METHOD: 3.86
RA PRESSURE ESTIMATED: 3 MMHG
RIGHT ATRIUM AREA SYSTOLE A4C: 12.4 CM2
RIGHT VENTRICLE ID DIMENSION: 2.8 CM
RV PSP: 33 MMHG
SL CV LEFT ATRIUM LENGTH A2C: 5 CM
SL CV LV EF: 65
SL CV PED ECHO LEFT VENTRICLE DIASTOLIC VOLUME (MOD BIPLANE) 2D: 56 ML
SL CV PED ECHO LEFT VENTRICLE SYSTOLIC VOLUME (MOD BIPLANE) 2D: 15 ML
TR MAX PG: 30 MMHG
TR PEAK VELOCITY: 2.8 M/S
TRICUSPID ANNULAR PLANE SYSTOLIC EXCURSION: 1.8 CM
TRICUSPID VALVE PEAK REGURGITATION VELOCITY: 2.75 M/S

## 2025-04-23 PROCEDURE — 93306 TTE W/DOPPLER COMPLETE: CPT

## 2025-04-23 PROCEDURE — 93306 TTE W/DOPPLER COMPLETE: CPT | Performed by: STUDENT IN AN ORGANIZED HEALTH CARE EDUCATION/TRAINING PROGRAM

## 2025-04-27 ENCOUNTER — HOSPITAL ENCOUNTER (EMERGENCY)
Facility: HOSPITAL | Age: 85
Discharge: HOME | End: 2025-04-27
Attending: EMERGENCY MEDICINE | Admitting: EMERGENCY MEDICINE
Payer: MEDICARE

## 2025-04-27 VITALS
TEMPERATURE: 98.2 F | OXYGEN SATURATION: 97 % | BODY MASS INDEX: 45.32 KG/M2 | HEART RATE: 94 BPM | RESPIRATION RATE: 18 BRPM | DIASTOLIC BLOOD PRESSURE: 74 MMHG | SYSTOLIC BLOOD PRESSURE: 189 MMHG | HEIGHT: 60 IN | WEIGHT: 230.82 LBS

## 2025-04-27 DIAGNOSIS — K59.00 CONSTIPATION: Primary | ICD-10-CM

## 2025-04-27 PROCEDURE — 99283 EMERGENCY DEPT VISIT LOW MDM: CPT

## 2025-04-27 PROCEDURE — 99283 EMERGENCY DEPT VISIT LOW MDM: CPT | Performed by: EMERGENCY MEDICINE

## 2025-04-27 RX ORDER — SIMETHICONE 180 MG
180 CAPSULE ORAL EVERY 6 HOURS PRN
Qty: 60 CAPSULE | Refills: 0 | Status: SHIPPED | OUTPATIENT
Start: 2025-04-27

## 2025-04-27 RX ORDER — BISACODYL 5 MG/1
5 TABLET, DELAYED RELEASE ORAL DAILY
Qty: 30 TABLET | Refills: 0 | Status: SHIPPED | OUTPATIENT
Start: 2025-04-27

## 2025-04-27 NOTE — ED NOTES
Enema initiated, patient unable to tolerate entire bag in one sitting. RN administered approximately 750 cc of fluid via rectum. Patient reported poor rectal tone and unable to hold enema fluid. Patient assisted to transfer to the [facility]. Patient instructed to ring when she has completed her bowel movement to attempt the remaining amount. Plan of care ongoing.      Lilly Dc RN  04/27/25 8755

## 2025-04-27 NOTE — ED PROVIDER NOTES
Time reflects when diagnosis was documented in both MDM as applicable and the Disposition within this note       Time User Action Codes Description Comment    4/27/2025  7:14 PM Jerome Aguilera Add [K59.00] Constipation           ED Disposition       ED Disposition   Discharge    Condition   Stable    Date/Time   Sun Apr 27, 2025  7:14 PM    Comment   Britni Curran discharge to home/self care.                   Assessment & Plan       Medical Decision Making  Problems Addressed:  Constipation: chronic illness or injury    Risk  OTC drugs.  Prescription drug management.        ED Course as of 04/27/25 2004   Sun Apr 27, 2025   1914 Constipation resolved with SS enema.        Medications - No data to display    ED Risk Strat Scores                    (ISAR) Identification of Seniors at Risk  Before the illness or injury that brought you to the Emergency, did you need someone to help you on a regular basis?: 0  In the last 24 hours, have you needed more help than usual?: 0  Have you been hospitalized for one or more nights during the past 6 months?: 0  In general, do you see well?: 0  In general, do you have serious problems with your memory?: 0  Do you take more than three different medications every day?: 1  ISAR Score: 1            SBIRT 20yo+      Flowsheet Row Most Recent Value   Initial Alcohol Screen: US AUDIT-C     1. How often do you have a drink containing alcohol? 0 Filed at: 04/27/2025 1716   2. How many drinks containing alcohol do you have on a typical day you are drinking?  0 Filed at: 04/27/2025 1716   3a. Male UNDER 65: How often do you have five or more drinks on one occasion? 0 Filed at: 04/27/2025 1716   3b. FEMALE Any Age, or MALE 65+: How often do you have 4 or more drinks on one occassion? 0 Filed at: 04/27/2025 1716   Audit-C Score 0 Filed at: 04/27/2025 1716   MONALISA: How many times in the past year have you...    Used an illegal drug or used a prescription medication for non-medical reasons?  "Never Filed at: 2025 0366                            History of Present Illness       Chief Complaint   Patient presents with    Constipation     Reports constipation and gas x4 days, took miralax and milk of mag w/o relief. Reports passed some stool this morning \"but theres an under layer stuck by my asshole and I ate and I'm backed up again.\"       Past Medical History:   Diagnosis Date    Coronary artery disease 3/2019    triple bipass surgery    CPAP (continuous positive airway pressure) dependence     Diabetes mellitus (HCC)     Hyperlipidemia     Hypertension     Peripheral artery disease (HCC) 2018    Sleep apnea       Past Surgical History:   Procedure Laterality Date    CHOLECYSTECTOMY      COLONOSCOPY      CORONARY ARTERY BYPASS GRAFT  03/2019    x 3      Family History   Problem Relation Age of Onset    Heart disease Mother     Diabetes Mother     Hypertension Mother     Heart disease Father     Hypertension Father     Diabetes Father     Heart failure Brother          of heart failure      Social History     Tobacco Use    Smoking status: Never    Smokeless tobacco: Never   Vaping Use    Vaping status: Never Used   Substance Use Topics    Alcohol use: Not Currently     Alcohol/week: 1.0 standard drink of alcohol     Types: 1 Glasses of wine per week     Comment: rarely     Drug use: Never      E-Cigarette/Vaping    E-Cigarette Use Never User       E-Cigarette/Vaping Substances    Nicotine No     THC No     CBD No     Flavoring No     Other No     Unknown No       I have reviewed and agree with the history as documented.     83 yo female with chronic constipation who presents to the ED c/o same. She states she is unable to eat because every time she eats she feels gas pain and constipation. She was previously prescribed a stool softener and dulcolax but does not take them. She notes she has been taking miralax which does result in her having bowel movements yet she still feels constipated and " requests an enema. No fever. No vomiting. No abd pain aside from the above-reference post-prandial gas pain. No anorexia. No weight loss. No abdominal distension. No gi bleeding. No urinary sxs. No cp or sob.         Review of Systems   Gastrointestinal:  Positive for abdominal pain and constipation.   All other systems reviewed and are negative.          Objective       ED Triage Vitals [04/27/25 1713]   Temperature Pulse Blood Pressure Respirations SpO2 Patient Position - Orthostatic VS   98.2 °F (36.8 °C) 94 (!) 189/74 18 97 % Sitting      Temp Source Heart Rate Source BP Location FiO2 (%) Pain Score    Temporal Monitor Left arm -- --      Vitals      Date and Time Temp Pulse SpO2 Resp BP Pain Score FACES Pain Rating User   04/27/25 1713 98.2 °F (36.8 °C) 94 97 % 18 189/74 -- -- TO            Physical Exam  Vitals and nursing note reviewed.   Constitutional:       General: She is not in acute distress.     Appearance: She is well-developed. She is not ill-appearing, toxic-appearing or diaphoretic.   HENT:      Head: Normocephalic and atraumatic.   Eyes:      General:         Right eye: No discharge.         Left eye: No discharge.      Conjunctiva/sclera: Conjunctivae normal.      Pupils: Pupils are equal, round, and reactive to light.   Neck:      Vascular: No JVD.   Pulmonary:      Breath sounds: No stridor.   Abdominal:      General: There is no distension.      Tenderness: There is no abdominal tenderness. There is no guarding or rebound.   Musculoskeletal:         General: No tenderness or deformity. Normal range of motion.      Cervical back: Normal range of motion and neck supple.   Skin:     General: Skin is warm and dry.      Capillary Refill: Capillary refill takes less than 2 seconds.   Neurological:      Mental Status: She is alert and oriented to person, place, and time.      Cranial Nerves: No cranial nerve deficit.      Sensory: No sensory deficit.      Motor: No abnormal muscle tone.       Coordination: Coordination normal.         Results Reviewed       None            No orders to display       Procedures    ED Medication and Procedure Management   Prior to Admission Medications   Prescriptions Last Dose Informant Patient Reported? Taking?   Accu-Chek FastClix Lancets MISC  Self Yes No   Sig: Use   B Complex-Biotin-FA (HM Vitamin B100 Complex) TABS  Self Yes No   Sig: Take by mouth   B-D ULTRAFINE III SHORT PEN 31G X 8 MM MISC  Self Yes No   Si (four) times a day   Blood Glucose Monitoring Suppl (Accu-Chek Guide) w/Device KIT  Self Yes No   Sig: Use   Probiotic Product (Align) 4 MG CAPS  Self Yes No   Sig: Take 1 capsule by mouth   Restasis 0.05 % ophthalmic emulsion  Self Yes No   Sig: INSTILL 1 DROP INTO BOTH EYES TWICE A DAY   amLODIPine (NORVASC) 5 mg tablet  Self Yes No   Sig: Take 5 mg by mouth daily   ascorbic Acid (VITAMIN C) 500 MG CPCR  Self Yes No   Sig: capsule, extended release: 500 mg 1 capsule twice a day by mouth   cilostazol (PLETAL) 100 mg tablet  Self No No   Sig: TAKE 1 TABLET BY MOUTH TWICE A DAY   furosemide (LASIX) 20 mg tablet  Self No No   Sig: Take 1 tablet (20 mg total) by mouth 2 (two) times a day   Patient taking differently: Take 20 mg by mouth daily   glucose blood (Accu-Chek Guide) test strip  Self Yes No   hydrOXYzine HCL (ATARAX) 50 mg tablet   No No   Sig: TAKE 1 TABLET BY MOUTH DAILY AT BEDTIME   insulin aspart (NovoLOG FlexPen ReliOn) 100 UNIT/ML injection pen  Self Yes No   Sig: As directed UP TP 75 UNITS PER DAYAs directed UP TP 75 UNITS PER DAY   insulin degludec (Tresiba FlexTouch) 100 units/mL injection pen  Self Yes No   Sig: Inject 52 Units under the skin daily at bedtime   ipratropium (ATROVENT) 0.06 % nasal spray  Self Yes No   losartan-hydrochlorothiazide (HYZAAR) 100-25 MG per tablet  Self Yes No   Sig: Take 1 tablet by mouth daily   magnesium hydroxide (MILK OF MAGNESIA) 400 mg/5 mL oral suspension  Self Yes No   Sig: Take by mouth daily as needed    meclizine (ANTIVERT) 25 mg tablet  Self Yes No   Sig: Take by mouth 3 (three) times a day as needed for dizziness   metoprolol tartrate (LOPRESSOR) 25 mg tablet  Self No No   Sig: TAKE 1 TABLET BY MOUTH TWICE A DAY   Patient taking differently: Take 25 mg by mouth in the morning   montelukast (SINGULAIR) 10 mg tablet  Self No No   Sig: TAKE 1 TABLET BY MOUTH EVERYDAY AT BEDTIME   Patient not taking: Reported on 1/24/2025   nystatin (MYCOSTATIN) cream  Self No No   Sig: APPLY TO AFFECTED AREA TWICE A DAY   ondansetron (ZOFRAN-ODT) 4 mg disintegrating tablet   No No   Sig: Take 1 tablet (4 mg total) by mouth every 6 (six) hours as needed for nausea or vomiting   rosuvastatin (CRESTOR) 40 MG tablet   No No   Sig: TAKE 1 TABLET BY MOUTH EVERY DAY   simethicone (MYLICON) 80 mg chewable tablet  Self Yes No   Sig: Chew 80 mg   sucralfate (CARAFATE) 1 g tablet   No No   Sig: TAKE 1 TABLET BY ORAL ROUTE 4 TIMES EVERY DAY ON AN EMPTY STOMACH 1 HOUR BEFORE MEALS AND AT BEDTIME      Facility-Administered Medications: None     Patient's Medications   Discharge Prescriptions    BISACODYL (DULCOLAX) 5 MG EC TABLET    Take 1 tablet (5 mg total) by mouth in the morning       Start Date: 4/27/2025 End Date: --       Order Dose: 5 mg       Quantity: 30 tablet    Refills: 0    POLYETHYLENE GLYCOL (GOLYTELY) 4000 ML SOLUTION    Take 4,000 mL by mouth once for 1 dose       Start Date: 4/27/2025 End Date: 4/27/2025       Order Dose: 4,000 mL       Quantity: 4000 mL    Refills: 0    SIMETHICONE (MYLICON,GAS-X) 180 MG CAPSULE    Take 1 capsule (180 mg total) by mouth every 6 (six) hours as needed for flatulence       Start Date: 4/27/2025 End Date: --       Order Dose: 180 mg       Quantity: 60 capsule    Refills: 0       ED SEPSIS DOCUMENTATION   Time reflects when diagnosis was documented in both MDM as applicable and the Disposition within this note       Time User Action Codes Description Comment    4/27/2025  7:14 PM Jerome Aguilera Add  [K59.00] Constipation                  Jerome Aguilera MD  04/27/25 1945       Jerome Aguilera MD  04/27/25 2004

## 2025-05-07 ENCOUNTER — TELEPHONE (OUTPATIENT)
Age: 85
End: 2025-05-07

## 2025-05-07 ENCOUNTER — HOSPITAL ENCOUNTER (OUTPATIENT)
Dept: NON INVASIVE DIAGNOSTICS | Facility: HOSPITAL | Age: 85
Discharge: HOME | End: 2025-05-07

## 2025-05-07 NOTE — TELEPHONE ENCOUNTER
Pt called in requesting if Dr. Garnett could please give her a call at her earliest convenience?    Pt reports she has been having trouble sleeping, not sleeping well, trouble breathing. Pt wished to speak with Dr. Garnett about this and other concerns she has been having and is not sure on which direction she should go or who to see?    Offered to schedule an appt, but pt states due to not getting much sleep, she is too afraid to drive.     Please advise & call pt back with update. Thank you!    Britni: [phone]

## 2025-05-08 ENCOUNTER — TELEPHONE (OUTPATIENT)
Dept: ADMINISTRATIVE | Facility: OTHER | Age: 85
End: 2025-05-08

## 2025-05-08 ENCOUNTER — TELEMEDICINE (OUTPATIENT)
Age: 85
End: 2025-05-08
Payer: MEDICARE

## 2025-05-08 DIAGNOSIS — K59.09 CHRONIC CONSTIPATION: ICD-10-CM

## 2025-05-08 DIAGNOSIS — G47.33 OSA (OBSTRUCTIVE SLEEP APNEA): Primary | ICD-10-CM

## 2025-05-08 PROCEDURE — 99214 OFFICE O/P EST MOD 30 MIN: CPT | Performed by: FAMILY MEDICINE

## 2025-05-08 PROCEDURE — G2211 COMPLEX E/M VISIT ADD ON: HCPCS | Performed by: FAMILY MEDICINE

## 2025-05-08 NOTE — TELEPHONE ENCOUNTER
----- Message from Divya CORTEZ sent at 5/8/2025  8:48 AM EDT -----  Regarding: diabetic foot exam, eye exam and DXA scan  05/08/25 8:48 AM    Hello, our patient attached above is 84 year old and no longer meets the quality parameters of age for foot eye and dxa scan. Please exclude them from her quality list. Thank you     Thank you,  Divya Carrillo MA   [facility] [place]

## 2025-05-08 NOTE — TELEPHONE ENCOUNTER
Upon review of the In Basket request we have noted this is a NON-VALUE BASED item. We are unable to complete this request. Our team has the capability to assist in retrieval, updating, and linking of the following items: Chlamydia, CRC Cologuard, CRC Colonoscopy, CRC CT Colonography, CRC FIT/FOBT(3), CRC Sigmoidoscopy, CT Lung Screening, DEXA Scan, Diabetic Eye Exam, Diabetic Foot Exam, Hemoglobin A1c, Hemoglobin (pediatrics), Hepatitis C, HIV (cannot retrieve), Immunizations, Lead, Mammogram, Medicare AWV, Pap Smear (HPV), and Urine Microalbumin.    Any additional questions or concerns should be emailed to the Practice Liaisons via the appropriate education email address, please do not reply via In Basket.    Thank you  Lakeisha Peng MA   [facility]UE [facility]ED [facility]

## 2025-05-08 NOTE — PROGRESS NOTES
Virtual Regular VisitName: Britni Curran      : 1940      MRN: [id number]  Encounter Provider: Shonna Garnett DO  Encounter Date: 2025   Encounter department: [facility] [place]  :  Assessment & Plan  FILIBERTO (obstructive sleep apnea)  Patient complains of persistent nocturnal dyspnea.  So far cardiovascular workup is benign.  Still has to obtain stress test.  Pulmonary workup is benign.  History of FILIBERTO but she does not use her CPAP.  Recommendation is to touch base with her sleep medicine provider for a repeat polysomnogram.  If patient is becoming dyspneic to the point of hypoxia while trying to sleep that she may qualify for oxygen.  Recommended patient to start breathing through her mouth since it is difficult to nasally breathe at bedtime.  Reminded patient to use her intranasal sprays to help facilitate this issue as well  Orders:    Ambulatory Referral to Sleep Medicine; Future    Chronic constipation  Longstanding issue.  Using multiple OTC laxatives.  Recently in the ER for the need of an enema.  History of trialing Linzess therapy but she admitted to using with OTC laxatives as well thus had significant diarrhea.  At this time we will retry Linzess therapy.  Clear specify with patient not to use any laxatives along with Linzess therapy to prevent diarrhea  Orders:    linaCLOtide 72 MCG CAPS; Take 72 mcg by mouth daily at least 30 minutes prior to the first meal of the day          History of Present Illness     Pt presents for continued issue of shortness of breath at night and constipation.       Review of Systems   Constitutional:  Negative for fever.   HENT:  Positive for congestion.    Respiratory:  Positive for shortness of breath.    Gastrointestinal:  Positive for constipation.   Psychiatric/Behavioral:  Positive for sleep disturbance.        Objective   There were no vitals taken for this visit.    Physical Exam  HENT:      Head: Normocephalic.   Pulmonary:       Effort: Pulmonary effort is normal.      Comments: No conversational dyspnea  Neurological:      Mental Status: She is alert and oriented to person, place, and time.   Psychiatric:         Mood and Affect: Mood normal.         Behavior: Behavior normal.         Administrative Statements   Encounter provider Shonna Garnett, DO    The Patient is located at Home and in the following state in which I hold an active license [place].    The patient was identified by name and date of birth. Britni Curran was informed that this is a telemedicine visit and that the visit is being conducted through the Epic Embedded platform. She agrees to proceed..  My office door was closed. No one else was in the room.  She acknowledged consent and understanding of privacy and security of the video platform. The patient has agreed to participate and understands they can discontinue the visit at any time.    I have spent a total time of 22 minutes in caring for this patient on the day of the visit/encounter including Prognosis, Risks and benefits of tx options, Instructions for management, Importance of tx compliance, and Risk factor reductions, not including the time spent for establishing the audio/video connection.

## 2025-05-12 ENCOUNTER — TRANSCRIBE ORDERS (OUTPATIENT)
Dept: SLEEP CENTER | Facility: CLINIC | Age: 85
End: 2025-05-12

## 2025-05-12 DIAGNOSIS — G47.33 OSA (OBSTRUCTIVE SLEEP APNEA): Primary | ICD-10-CM

## 2025-06-06 DIAGNOSIS — K59.00 CONSTIPATION: ICD-10-CM

## 2025-06-06 RX ORDER — BISACODYL 5 MG/1
5 TABLET, DELAYED RELEASE ORAL DAILY
Qty: 30 TABLET | Refills: 0 | Status: SHIPPED | OUTPATIENT
Start: 2025-06-06

## 2025-06-06 NOTE — TELEPHONE ENCOUNTER
Reason for call:   [x] Refill   [] Prior Auth  [] Other:     Office:   [x] PCP/Provider -   [] Specialty/Provider -     Medication: Bisacodyl    Dose/Frequency: 5 mg     Quantity:  30 tablets    Pharmacy: [facility]/pharmacy #[id number] [place] - [id number]     Local Pharmacy   Does the patient have enough for 3 days?   [x] Yes   [] No - Send as HP to POD    Mail Away Pharmacy   Does the patient have enough for 10 days?   [] Yes   [] No - Send as HP to POD

## 2025-06-07 DIAGNOSIS — I10 ESSENTIAL HYPERTENSION: ICD-10-CM

## 2025-06-08 RX ORDER — METOPROLOL TARTRATE 25 MG/1
25 TABLET, FILM COATED ORAL 2 TIMES DAILY
Qty: 180 TABLET | Refills: 1 | Status: SHIPPED | OUTPATIENT
Start: 2025-06-08

## 2025-06-12 DIAGNOSIS — I73.9 PVD (PERIPHERAL VASCULAR DISEASE) (HCC): ICD-10-CM

## 2025-06-12 RX ORDER — CILOSTAZOL 100 MG/1
100 TABLET ORAL 2 TIMES DAILY
Qty: 180 TABLET | Refills: 1 | Status: SHIPPED | OUTPATIENT
Start: 2025-06-12

## 2025-07-04 DIAGNOSIS — K59.00 CONSTIPATION: ICD-10-CM

## 2025-07-06 RX ORDER — BISACODYL 5 MG
5 TABLET, DELAYED RELEASE (ENTERIC COATED) ORAL EVERY MORNING
Qty: 30 TABLET | Refills: 0 | Status: SHIPPED | OUTPATIENT
Start: 2025-07-06 | End: 2025-07-15

## 2025-07-12 DIAGNOSIS — K59.00 CONSTIPATION: ICD-10-CM

## 2025-07-15 RX ORDER — BISACODYL 5 MG
5 TABLET, DELAYED RELEASE (ENTERIC COATED) ORAL EVERY MORNING
Qty: 30 TABLET | Refills: 0 | Status: SHIPPED | OUTPATIENT
Start: 2025-07-15

## 2025-07-18 ENCOUNTER — TELEPHONE (OUTPATIENT)
Age: 85
End: 2025-07-18

## 2025-07-18 NOTE — TELEPHONE ENCOUNTER
Patient is requesting her labs to be faxed to [facility]. Please advise.

## 2025-07-19 LAB
ALBUMIN SERPL BCG-MCNC: 3.8 G/DL (ref 3.5–5)
ALP SERPL-CCNC: 70 U/L (ref 34–104)
ALT SERPL W P-5'-P-CCNC: 12 U/L (ref 7–52)
ANION GAP SERPL CALCULATED.3IONS-SCNC: 7 MMOL/L (ref 4–13)
AST SERPL W P-5'-P-CCNC: 11 U/L (ref 13–39)
BASOPHILS # BLD AUTO: 0.03 THOUSANDS/ÂΜL (ref 0–0.1)
BASOPHILS NFR BLD AUTO: 0 % (ref 0–1)
BILIRUB SERPL-MCNC: 0.28 MG/DL (ref 0.2–1)
BUN SERPL-MCNC: 41 MG/DL (ref 5–25)
CALCIUM SERPL-MCNC: 9.4 MG/DL (ref 8.4–10.2)
CHLORIDE SERPL-SCNC: 98 MMOL/L (ref 96–108)
CO2 SERPL-SCNC: 27 MMOL/L (ref 21–32)
CREAT SERPL-MCNC: 0.67 MG/DL (ref 0.6–1.3)
EOSINOPHIL # BLD AUTO: 0.05 THOUSAND/ÂΜL (ref 0–0.61)
EOSINOPHIL NFR BLD AUTO: 1 % (ref 0–6)
ERYTHROCYTE [DISTWIDTH] IN BLOOD BY AUTOMATED COUNT: 13.5 % (ref 11.6–15.1)
GFR SERPL CREATININE-BSD FRML MDRD: 82 ML/MIN/1.73SQ M
GLUCOSE SERPL-MCNC: 170 MG/DL (ref 65–140)
HCT VFR BLD AUTO: 31.5 % (ref 34.8–46.1)
HGB BLD-MCNC: 10.7 G/DL (ref 11.5–15.4)
IMM GRANULOCYTES # BLD AUTO: 0.02 THOUSAND/UL (ref 0–0.2)
IMM GRANULOCYTES NFR BLD AUTO: 0 % (ref 0–2)
LIPASE SERPL-CCNC: 22 U/L (ref 11–82)
LYMPHOCYTES # BLD AUTO: 3.3 THOUSANDS/ÂΜL (ref 0.6–4.47)
LYMPHOCYTES NFR BLD AUTO: 33 % (ref 14–44)
MCH RBC QN AUTO: 29.9 PG (ref 26.8–34.3)
MCHC RBC AUTO-ENTMCNC: 34 G/DL (ref 31.4–37.4)
MCV RBC AUTO: 88 FL (ref 82–98)
MONOCYTES # BLD AUTO: 0.65 THOUSAND/ÂΜL (ref 0.17–1.22)
MONOCYTES NFR BLD AUTO: 7 % (ref 4–12)
NEUTROPHILS # BLD AUTO: 5.87 THOUSANDS/ÂΜL (ref 1.85–7.62)
NEUTS SEG NFR BLD AUTO: 59 % (ref 43–75)
NRBC BLD AUTO-RTO: 0 /100 WBCS
PLATELET # BLD AUTO: 293 THOUSANDS/UL (ref 149–390)
PMV BLD AUTO: 10.5 FL (ref 8.9–12.7)
POTASSIUM SERPL-SCNC: 3.9 MMOL/L (ref 3.5–5.3)
PROT SERPL-MCNC: 5.8 G/DL (ref 6.4–8.4)
RBC # BLD AUTO: 3.58 MILLION/UL (ref 3.81–5.12)
SODIUM SERPL-SCNC: 132 MMOL/L (ref 135–147)
WBC # BLD AUTO: 9.92 THOUSAND/UL (ref 4.31–10.16)

## 2025-07-19 PROCEDURE — 36415 COLL VENOUS BLD VENIPUNCTURE: CPT

## 2025-07-19 PROCEDURE — 99285 EMERGENCY DEPT VISIT HI MDM: CPT

## 2025-07-19 PROCEDURE — 85025 COMPLETE CBC W/AUTO DIFF WBC: CPT

## 2025-07-19 PROCEDURE — 83690 ASSAY OF LIPASE: CPT

## 2025-07-19 PROCEDURE — 80053 COMPREHEN METABOLIC PANEL: CPT

## 2025-07-20 ENCOUNTER — HOSPITAL ENCOUNTER (INPATIENT)
Facility: HOSPITAL | Age: 85
LOS: 1 days | Discharge: HOME | DRG: 378 | End: 2025-07-22
Attending: EMERGENCY MEDICINE | Admitting: INTERNAL MEDICINE
Payer: MEDICARE

## 2025-07-20 ENCOUNTER — APPOINTMENT (EMERGENCY)
Dept: RADIOLOGY | Facility: HOSPITAL | Age: 85
DRG: 378 | End: 2025-07-20
Payer: MEDICARE

## 2025-07-20 ENCOUNTER — APPOINTMENT (EMERGENCY)
Dept: CT IMAGING | Facility: HOSPITAL | Age: 85
DRG: 378 | End: 2025-07-20
Payer: MEDICARE

## 2025-07-20 DIAGNOSIS — E87.1 HYPONATREMIA: ICD-10-CM

## 2025-07-20 DIAGNOSIS — D64.9 ANEMIA, UNSPECIFIED TYPE: ICD-10-CM

## 2025-07-20 DIAGNOSIS — K92.1 MELENA: ICD-10-CM

## 2025-07-20 DIAGNOSIS — K62.5 RECTAL BLEEDING: Primary | ICD-10-CM

## 2025-07-20 PROBLEM — I50.30 DIASTOLIC CHF (HCC): Status: ACTIVE | Noted: 2025-07-20

## 2025-07-20 PROBLEM — K92.2 GI BLEED: Status: ACTIVE | Noted: 2025-07-20

## 2025-07-20 LAB
2HR DELTA HS TROPONIN: 0 NG/L
4HR DELTA HS TROPONIN: 0 NG/L
ABO GROUP BLD: NORMAL
ABO GROUP BLD: NORMAL
ANION GAP SERPL CALCULATED.3IONS-SCNC: 8 MMOL/L (ref 4–13)
APTT PPP: 29 SECONDS (ref 23–34)
ATRIAL RATE: 100 BPM
ATRIAL RATE: 99 BPM
BLD GP AB SCN SERPL QL: NEGATIVE
BUN SERPL-MCNC: 36 MG/DL (ref 5–25)
CALCIUM SERPL-MCNC: 9.3 MG/DL (ref 8.4–10.2)
CARDIAC TROPONIN I PNL SERPL HS: 5 NG/L (ref ?–50)
CHLORIDE SERPL-SCNC: 99 MMOL/L (ref 96–108)
CO2 SERPL-SCNC: 26 MMOL/L (ref 21–32)
CREAT SERPL-MCNC: 0.61 MG/DL (ref 0.6–1.3)
ERYTHROCYTE [DISTWIDTH] IN BLOOD BY AUTOMATED COUNT: 13.7 % (ref 11.6–15.1)
EST. AVERAGE GLUCOSE BLD GHB EST-MCNC: 166 MG/DL
FERRITIN SERPL-MCNC: 30 NG/ML (ref 30–307)
FERRITIN SERPL-MCNC: 30 NG/ML (ref 30–307)
GFR SERPL CREATININE-BSD FRML MDRD: 84 ML/MIN/1.73SQ M
GLUCOSE P FAST SERPL-MCNC: 159 MG/DL (ref 65–99)
GLUCOSE SERPL-MCNC: 125 MG/DL (ref 65–140)
GLUCOSE SERPL-MCNC: 159 MG/DL (ref 65–140)
GLUCOSE SERPL-MCNC: 166 MG/DL (ref 65–140)
GLUCOSE SERPL-MCNC: 170 MG/DL (ref 65–140)
GLUCOSE SERPL-MCNC: 241 MG/DL (ref 65–140)
HBA1C MFR BLD: 7.4 %
HCT VFR BLD AUTO: 30.2 % (ref 34.8–46.1)
HCT VFR BLD AUTO: 30.5 % (ref 34.8–46.1)
HCT VFR BLD AUTO: 31 % (ref 34.8–46.1)
HGB BLD-MCNC: 10.1 G/DL (ref 11.5–15.4)
HGB BLD-MCNC: 10.3 G/DL (ref 11.5–15.4)
HGB BLD-MCNC: 9.8 G/DL (ref 11.5–15.4)
INR PPP: 1 (ref 0.85–1.19)
IRON SATN MFR SERPL: 10 % (ref 15–50)
IRON SATN MFR SERPL: 15 % (ref 15–50)
IRON SERPL-MCNC: 42 UG/DL (ref 50–212)
IRON SERPL-MCNC: 64 UG/DL (ref 50–212)
LDH SERPL-CCNC: 131 U/L (ref 140–271)
MCH RBC QN AUTO: 29.9 PG (ref 26.8–34.3)
MCHC RBC AUTO-ENTMCNC: 33.2 G/DL (ref 31.4–37.4)
MCV RBC AUTO: 90 FL (ref 82–98)
P AXIS: 25 DEGREES
P AXIS: 34 DEGREES
PLATELET # BLD AUTO: 278 THOUSANDS/UL (ref 149–390)
PMV BLD AUTO: 10.6 FL (ref 8.9–12.7)
POTASSIUM SERPL-SCNC: 3.6 MMOL/L (ref 3.5–5.3)
PR INTERVAL: 148 MS
PR INTERVAL: 150 MS
PROTHROMBIN TIME: 13.9 SECONDS (ref 12.3–15)
QRS AXIS: -7 DEGREES
QRS AXIS: 9 DEGREES
QRSD INTERVAL: 126 MS
QRSD INTERVAL: 130 MS
QT INTERVAL: 382 MS
QT INTERVAL: 388 MS
QTC INTERVAL: 492 MS
QTC INTERVAL: 497 MS
RBC # BLD AUTO: 3.45 MILLION/UL (ref 3.81–5.12)
RH BLD: POSITIVE
RH BLD: POSITIVE
SODIUM SERPL-SCNC: 133 MMOL/L (ref 135–147)
SPECIMEN EXPIRATION DATE: NORMAL
T WAVE AXIS: 20 DEGREES
T WAVE AXIS: 23 DEGREES
TIBC SERPL-MCNC: 408.8 UG/DL (ref 250–450)
TIBC SERPL-MCNC: 425.6 UG/DL (ref 250–450)
TRANSFERRIN SERPL-MCNC: 292 MG/DL (ref 203–362)
TRANSFERRIN SERPL-MCNC: 304 MG/DL (ref 203–362)
TSH SERPL DL<=0.05 MIU/L-ACNC: 1.12 UIU/ML (ref 0.45–4.5)
UIBC SERPL-MCNC: 362 UG/DL (ref 155–355)
UIBC SERPL-MCNC: 367 UG/DL (ref 155–355)
VENTRICULAR RATE: 100 BPM
VENTRICULAR RATE: 99 BPM
WBC # BLD AUTO: 9.73 THOUSAND/UL (ref 4.31–10.16)

## 2025-07-20 PROCEDURE — 86901 BLOOD TYPING SEROLOGIC RH(D): CPT | Performed by: EMERGENCY MEDICINE

## 2025-07-20 PROCEDURE — 84484 ASSAY OF TROPONIN QUANT: CPT | Performed by: EMERGENCY MEDICINE

## 2025-07-20 PROCEDURE — 71045 X-RAY EXAM CHEST 1 VIEW: CPT

## 2025-07-20 PROCEDURE — 80048 BASIC METABOLIC PNL TOTAL CA: CPT | Performed by: INTERNAL MEDICINE

## 2025-07-20 PROCEDURE — 83540 ASSAY OF IRON: CPT | Performed by: INTERNAL MEDICINE

## 2025-07-20 PROCEDURE — 82728 ASSAY OF FERRITIN: CPT | Performed by: INTERNAL MEDICINE

## 2025-07-20 PROCEDURE — 82948 REAGENT STRIP/BLOOD GLUCOSE: CPT

## 2025-07-20 PROCEDURE — 86850 RBC ANTIBODY SCREEN: CPT | Performed by: EMERGENCY MEDICINE

## 2025-07-20 PROCEDURE — 83550 IRON BINDING TEST: CPT | Performed by: INTERNAL MEDICINE

## 2025-07-20 PROCEDURE — 85610 PROTHROMBIN TIME: CPT | Performed by: EMERGENCY MEDICINE

## 2025-07-20 PROCEDURE — 36415 COLL VENOUS BLD VENIPUNCTURE: CPT | Performed by: EMERGENCY MEDICINE

## 2025-07-20 PROCEDURE — 93010 ELECTROCARDIOGRAM REPORT: CPT | Performed by: INTERNAL MEDICINE

## 2025-07-20 PROCEDURE — 85027 COMPLETE CBC AUTOMATED: CPT | Performed by: INTERNAL MEDICINE

## 2025-07-20 PROCEDURE — 99285 EMERGENCY DEPT VISIT HI MDM: CPT | Performed by: EMERGENCY MEDICINE

## 2025-07-20 PROCEDURE — 85018 HEMOGLOBIN: CPT | Performed by: INTERNAL MEDICINE

## 2025-07-20 PROCEDURE — 85014 HEMATOCRIT: CPT | Performed by: INTERNAL MEDICINE

## 2025-07-20 PROCEDURE — 84443 ASSAY THYROID STIM HORMONE: CPT | Performed by: EMERGENCY MEDICINE

## 2025-07-20 PROCEDURE — 93005 ELECTROCARDIOGRAM TRACING: CPT

## 2025-07-20 PROCEDURE — 86900 BLOOD TYPING SEROLOGIC ABO: CPT | Performed by: EMERGENCY MEDICINE

## 2025-07-20 PROCEDURE — 83010 ASSAY OF HAPTOGLOBIN QUANT: CPT | Performed by: INTERNAL MEDICINE

## 2025-07-20 PROCEDURE — 85730 THROMBOPLASTIN TIME PARTIAL: CPT | Performed by: EMERGENCY MEDICINE

## 2025-07-20 PROCEDURE — 83615 LACTATE (LD) (LDH) ENZYME: CPT | Performed by: INTERNAL MEDICINE

## 2025-07-20 PROCEDURE — 99214 OFFICE O/P EST MOD 30 MIN: CPT | Performed by: INTERNAL MEDICINE

## 2025-07-20 PROCEDURE — 74177 CT ABD & PELVIS W/CONTRAST: CPT

## 2025-07-20 PROCEDURE — 99223 1ST HOSP IP/OBS HIGH 75: CPT | Performed by: INTERNAL MEDICINE

## 2025-07-20 PROCEDURE — 83036 HEMOGLOBIN GLYCOSYLATED A1C: CPT | Performed by: INTERNAL MEDICINE

## 2025-07-20 RX ORDER — ATORVASTATIN CALCIUM 40 MG/1
80 TABLET, FILM COATED ORAL
Status: DISCONTINUED | OUTPATIENT
Start: 2025-07-20 | End: 2025-07-20

## 2025-07-20 RX ORDER — PANTOPRAZOLE SODIUM 40 MG/10ML
40 INJECTION, POWDER, LYOPHILIZED, FOR SOLUTION INTRAVENOUS EVERY 12 HOURS SCHEDULED
Status: DISCONTINUED | OUTPATIENT
Start: 2025-07-20 | End: 2025-07-22

## 2025-07-20 RX ORDER — LABETALOL HYDROCHLORIDE 5 MG/ML
10 INJECTION, SOLUTION INTRAVENOUS EVERY 6 HOURS PRN
Status: DISCONTINUED | OUTPATIENT
Start: 2025-07-20 | End: 2025-07-22 | Stop reason: HOSPADM

## 2025-07-20 RX ORDER — FUROSEMIDE 20 MG/1
20 TABLET ORAL DAILY
Status: DISCONTINUED | OUTPATIENT
Start: 2025-07-20 | End: 2025-07-20

## 2025-07-20 RX ORDER — MECLIZINE HYDROCHLORIDE 25 MG/1
25 TABLET ORAL 3 TIMES DAILY PRN
Status: DISCONTINUED | OUTPATIENT
Start: 2025-07-20 | End: 2025-07-22 | Stop reason: HOSPADM

## 2025-07-20 RX ORDER — INSULIN LISPRO 100 [IU]/ML
16 INJECTION, SOLUTION INTRAVENOUS; SUBCUTANEOUS
Status: DISCONTINUED | OUTPATIENT
Start: 2025-07-20 | End: 2025-07-22 | Stop reason: HOSPADM

## 2025-07-20 RX ORDER — INSULIN LISPRO 100 [IU]/ML
1-6 INJECTION, SOLUTION INTRAVENOUS; SUBCUTANEOUS EVERY 6 HOURS SCHEDULED
Status: DISCONTINUED | OUTPATIENT
Start: 2025-07-20 | End: 2025-07-22 | Stop reason: HOSPADM

## 2025-07-20 RX ORDER — INSULIN LISPRO 100 [IU]/ML
16 INJECTION, SOLUTION INTRAVENOUS; SUBCUTANEOUS
Status: DISCONTINUED | OUTPATIENT
Start: 2025-07-20 | End: 2025-07-20

## 2025-07-20 RX ORDER — FLUTICASONE PROPIONATE 50 MCG
1 SPRAY, SUSPENSION (ML) NASAL DAILY
Status: DISCONTINUED | OUTPATIENT
Start: 2025-07-20 | End: 2025-07-22 | Stop reason: HOSPADM

## 2025-07-20 RX ORDER — HYDROXYZINE HYDROCHLORIDE 25 MG/1
50 TABLET, FILM COATED ORAL
Status: DISCONTINUED | OUTPATIENT
Start: 2025-07-20 | End: 2025-07-22 | Stop reason: HOSPADM

## 2025-07-20 RX ORDER — LOSARTAN POTASSIUM 50 MG/1
100 TABLET ORAL DAILY
Status: DISCONTINUED | OUTPATIENT
Start: 2025-07-20 | End: 2025-07-22 | Stop reason: HOSPADM

## 2025-07-20 RX ORDER — HYDROCHLOROTHIAZIDE 25 MG/1
25 TABLET ORAL DAILY
Status: DISCONTINUED | OUTPATIENT
Start: 2025-07-20 | End: 2025-07-22 | Stop reason: HOSPADM

## 2025-07-20 RX ORDER — METOPROLOL TARTRATE 25 MG/1
25 TABLET, FILM COATED ORAL 2 TIMES DAILY
Status: DISCONTINUED | OUTPATIENT
Start: 2025-07-20 | End: 2025-07-22 | Stop reason: HOSPADM

## 2025-07-20 RX ORDER — CILOSTAZOL 100 MG/1
100 TABLET ORAL 2 TIMES DAILY
Status: DISCONTINUED | OUTPATIENT
Start: 2025-07-20 | End: 2025-07-21

## 2025-07-20 RX ORDER — SODIUM CHLORIDE 9 MG/ML
50 INJECTION, SOLUTION INTRAVENOUS CONTINUOUS
Status: DISPENSED | OUTPATIENT
Start: 2025-07-20 | End: 2025-07-20

## 2025-07-20 RX ORDER — AMLODIPINE BESYLATE 5 MG/1
5 TABLET ORAL DAILY
Status: DISCONTINUED | OUTPATIENT
Start: 2025-07-20 | End: 2025-07-22 | Stop reason: HOSPADM

## 2025-07-20 RX ORDER — SUCRALFATE 1 G/1
1 TABLET ORAL
Status: DISCONTINUED | OUTPATIENT
Start: 2025-07-20 | End: 2025-07-22 | Stop reason: HOSPADM

## 2025-07-20 RX ORDER — ACETAMINOPHEN 325 MG/1
650 TABLET ORAL EVERY 6 HOURS PRN
Status: DISCONTINUED | OUTPATIENT
Start: 2025-07-20 | End: 2025-07-22 | Stop reason: HOSPADM

## 2025-07-20 RX ORDER — SIMETHICONE 80 MG
80 TABLET,CHEWABLE ORAL EVERY 6 HOURS PRN
Status: DISCONTINUED | OUTPATIENT
Start: 2025-07-20 | End: 2025-07-22 | Stop reason: HOSPADM

## 2025-07-20 RX ADMIN — ACETAMINOPHEN 650 MG: 325 TABLET ORAL at 19:11

## 2025-07-20 RX ADMIN — METOPROLOL TARTRATE 25 MG: 25 TABLET, FILM COATED ORAL at 17:05

## 2025-07-20 RX ADMIN — INSULIN LISPRO 1 UNITS: 100 INJECTION, SOLUTION INTRAVENOUS; SUBCUTANEOUS at 17:00

## 2025-07-20 RX ADMIN — IOHEXOL 100 ML: 350 INJECTION, SOLUTION INTRAVENOUS at 01:20

## 2025-07-20 RX ADMIN — INSULIN LISPRO 3 UNITS: 100 INJECTION, SOLUTION INTRAVENOUS; SUBCUTANEOUS at 11:24

## 2025-07-20 RX ADMIN — PANTOPRAZOLE SODIUM 40 MG: 40 INJECTION, POWDER, FOR SOLUTION INTRAVENOUS at 20:47

## 2025-07-20 RX ADMIN — INSULIN LISPRO 16 UNITS: 100 INJECTION, SOLUTION INTRAVENOUS; SUBCUTANEOUS at 12:51

## 2025-07-20 RX ADMIN — SUCRALFATE 1 G: 1 TABLET ORAL at 11:24

## 2025-07-20 RX ADMIN — INSULIN LISPRO 16 UNITS: 100 INJECTION, SOLUTION INTRAVENOUS; SUBCUTANEOUS at 16:59

## 2025-07-20 RX ADMIN — SIMETHICONE 80 MG: 80 TABLET, CHEWABLE ORAL at 19:11

## 2025-07-20 RX ADMIN — SODIUM CHLORIDE 50 ML/HR: 0.9 INJECTION, SOLUTION INTRAVENOUS at 04:10

## 2025-07-20 RX ADMIN — SUCRALFATE 1 G: 1 TABLET ORAL at 17:05

## 2025-07-20 RX ADMIN — SUCRALFATE 1 G: 1 TABLET ORAL at 21:06

## 2025-07-20 RX ADMIN — SIMETHICONE 80 MG: 80 TABLET, CHEWABLE ORAL at 06:10

## 2025-07-20 RX ADMIN — SUCRALFATE 1 G: 1 TABLET ORAL at 08:51

## 2025-07-20 RX ADMIN — FLUTICASONE PROPIONATE 1 SPRAY: 50 SPRAY, METERED NASAL at 20:40

## 2025-07-20 RX ADMIN — PANTOPRAZOLE SODIUM 40 MG: 40 INJECTION, POWDER, FOR SOLUTION INTRAVENOUS at 04:09

## 2025-07-20 RX ADMIN — INSULIN LISPRO 1 UNITS: 100 INJECTION, SOLUTION INTRAVENOUS; SUBCUTANEOUS at 06:22

## 2025-07-20 RX ADMIN — ACETAMINOPHEN 650 MG: 325 TABLET ORAL at 12:51

## 2025-07-20 NOTE — ASSESSMENT & PLAN NOTE
Lab Results   Component Value Date    HGBA1C 7.4 (H) 02/20/2025       Recent Labs     07/20/25  0549   POCGLU 166*       Blood Sugar Average: Last 72 hrs:  (P) 166

## 2025-07-20 NOTE — H&P
"H&P - Hospitalist   Name: Britni Curran 84 y.o. female I MRN: [id number]  Unit/Bed#: ED 10 I Date of Admission: 7/20/2025   Date of Service: 7/20/2025 I Hospital Day: 0     Assessment & Plan  GI bleed  CT abdomen negative for intraperitoneal bleed  Keep n.p.o.  Follow-up FOBT  Gentle IV fluid hydration with NS at 50 cc/h due to history of CHF  Protonix 40 mg IV every 12 hours  Carafate  GI consult  Anemia  Hgb is 10 and last few months ago was 14   Follow-up iron panel, LDH, haptoglobin  H&H every 6 hours  Transfuse if hemoglobin drops below 7  Coronary artery disease involving coronary bypass graft of native heart without angina pectoris  Stable  Holding beta-blockers for now while n.p.o. holding statins   Continue monitoring  Type 2 diabetes mellitus without complication, with long-term current use of insulin (HCC)  Lab Results   Component Value Date    HGBA1C 7.4 (H) 02/20/2025       No results for input(s): \"POCGLU\" in the last 72 hours.    Blood Sugar Average: Last 72 hrs:  Uses Tresiba 52 units nightly, here on hold while NPO  Insulin sliding scale with fingersticks every 6 hours while NPO  Resume diabetic diet once able to as per GI  Essential hypertension  Holding amlodipine, losartan, hydrochlorothiazide, metoprolol while n.p.o.  Labetalol IV push as needed for elevated blood pressures    Chronic constipation  Now has melena  Uses linaclotide at home  Diastolic CHF (HCC)  Wt Readings from Last 3 Encounters:   04/27/25 105 kg (230 lb 13.2 oz)   04/23/25 103 kg (226 lb)   03/13/25 103 kg (226 lb)     Most recent echo showed diastolic dysfunction  Does not use Lasix anymore , reports it dehydrates her   Monitor volume status carefully while on fluids  Metoprolol on hold        VTE Pharmacologic Prophylaxis:   High Risk (Score >/= 5) - Pharmacological DVT Prophylaxis Contraindicated. Sequential Compression Devices Ordered.  Code Status: Level 1 - Full Code   Discussion with family:  .     Anticipated " Length of Stay: Patient will be admitted on an observation basis with an anticipated length of stay of less than 2 midnights secondary to GI bleed.    History of Present Illness   Chief Complaint: Crys Curran is a 84 y.o. female with a PMH of chronic constipation , DM, HTN, CAD status post CABG, PAD, diastolic heart failure FILIBERTO who came in with complaint of melena   for one day.  Patient reports that she has been feeling not well yesterday, with gazzy stomach, belching and bloating.  Yesterday had 2 episodes of dark stools, said the second 1 was almost black.   Reports associated nausea no vomiting.  Denies any blood or blood clots in the stool. Denies abdominal pain, diarrhea . States she has chronic constipation and uses laxatives all the time but this is a new dark melena stools.  She is not on any blood thinners.    Labs showed drop in hemoglobin to 10 from 14 for few months ago  CT scan negative for acute findings  On my evaluation patient appears to be in no acute distress, hemodynamically stable.  Patient denies any shortness of breath , chest pain, palpitations, cough, fever, chills, nausea vomiting, changes in urinary habits.      Review of Systems   Constitutional: Negative.    Respiratory: Negative.     Cardiovascular: Negative.    Gastrointestinal:  Positive for abdominal distention. Negative for abdominal pain, anal bleeding, constipation, diarrhea, nausea, rectal pain and vomiting.        Dark stool   Genitourinary: Negative.    Skin: Negative.    Neurological: Negative.        Historical Information   Past Medical History[1]  Past Surgical History[2]  Social History[3]  E-Cigarette/Vaping    E-Cigarette Use Never User      E-Cigarette/Vaping Substances    Nicotine No     THC No     CBD No     Flavoring No     Other No     Unknown No        Social History:  Marital Status: Single   Occupation:   Patient Pre-hospital Living Situation: Home  Patient Pre-hospital Level of Mobility:  walks  Patient Pre-hospital Diet Restrictions:     Meds/Allergies   I have reviewed home medications with patient personally.  Prior to Admission medications    Medication Sig Start Date End Date Taking? Authorizing Provider   Accu-Chek FastClix Lancets MISC Use    Historical Provider, MD   amLODIPine (NORVASC) 5 mg tablet Take 5 mg by mouth in the morning. 3/10/24   Historical Provider, MD   ascorbic Acid (VITAMIN C) 500 MG CPCR     Historical Provider, MD   B Complex-Biotin-FA ( Vitamin B100 Complex) TABS Take by mouth    Historical Provider, MD MURRAY ULTRAFINE III SHORT PEN 31G X 8 MM MISC in the morning and at noon and in the evening and before bedtime. 4/26/23   Historical Provider, MD   bisacodyl 5 MG EC tablet TAKE 1 TABLET (5 MG TOTAL) BY MOUTH IN THE MORNING 7/15/25   Shonna Garnett DO   Blood Glucose Monitoring Suppl (Accu-Chek Guide) w/Device KIT Use    Historical Provider, MD   cilostazol (PLETAL) 100 mg tablet TAKE 1 TABLET BY MOUTH TWICE A DAY 6/12/25   Shonna Garnett DO   furosemide (LASIX) 20 mg tablet Take 1 tablet (20 mg total) by mouth 2 (two) times a day  Patient taking differently: Take 20 mg by mouth in the morning. 3/4/25   Shonna Garnett DO   glucose blood (Accu-Chek Guide) test strip  3/29/21   Historical Provider, MD   hydrOXYzine HCL (ATARAX) 50 mg tablet TAKE 1 TABLET BY MOUTH DAILY AT BEDTIME 4/4/25   Shonna Garnett DO   insulin aspart (NovoLOG FlexPen ReliOn) 100 UNIT/ML injection pen  8/10/23   Historical Provider, MD   insulin degludec (Tresiba FlexTouch) 100 units/mL injection pen Inject 52 Units under the skin daily at bedtime    Historical Provider, MD   ipratropium (ATROVENT) 0.06 % nasal spray  11/29/22   Historical Provider, MD   linaCLOtide 72 MCG CAPS Take 72 mcg by mouth daily at least 30 minutes prior to the first meal of the day 5/8/25   Shonna Garnett DO   losartan-hydrochlorothiazide (HYZAAR) 100-25 MG per tablet Take 1  tablet by mouth in the morning.    Historical Provider, MD   magnesium hydroxide (MILK OF MAGNESIA) 400 mg/5 mL oral suspension Take by mouth daily as needed    Historical Provider, MD   meclizine (ANTIVERT) 25 mg tablet Take by mouth as needed in the morning and as needed at noon and as needed in the evening for dizziness.    Historical Provider, MD   metoprolol tartrate (LOPRESSOR) 25 mg tablet TAKE 1 TABLET BY MOUTH TWICE A DAY 6/8/25   Shonna Garnett DO   montelukast (SINGULAIR) 10 mg tablet TAKE 1 TABLET BY MOUTH EVERYDAY AT BEDTIME  Patient not taking: Reported on 1/24/2025 3/29/24   Shonna Garnett DO   nystatin (MYCOSTATIN) cream APPLY TO AFFECTED AREA TWICE A DAY 2/5/24   Shonna Garnett DO   ondansetron (ZOFRAN-ODT) 4 mg disintegrating tablet Take 1 tablet (4 mg total) by mouth every 6 (six) hours as needed for nausea or vomiting 4/8/25   Shonna Garnett DO   polyethylene glycol (GOLYTELY) 4000 mL solution Take 4,000 mL by mouth once for 1 dose 4/27/25 4/27/25  Jerome Aguilera MD   Probiotic Product (Align) 4 MG CAPS Take 1 capsule by mouth    Historical Provider, MD   Restasis 0.05 % ophthalmic emulsion  1/10/23   Historical Provider, MD   rosuvastatin (CRESTOR) 40 MG tablet TAKE 1 TABLET BY MOUTH EVERY DAY 4/8/25   Boone Moss MD   simethicone (MYLICON,GAS-X) 180 MG capsule Take 1 capsule (180 mg total) by mouth every 6 (six) hours as needed for flatulence 4/27/25   Jerome Aguilera MD   sucralfate (CARAFATE) 1 g tablet TAKE 1 TABLET BY ORAL ROUTE 4 TIMES EVERY DAY ON AN EMPTY STOMACH 1 HOUR BEFORE MEALS AND AT BEDTIME 3/17/25   Shonna Garnett DO     Allergies   Allergen Reactions    Keflex [Cephalexin] Hives    Penicillins Rash    Atorvastatin Other (See Comments)     Other reaction(s): Muscle pain  Other reaction(s): Muscle pain      Metformin Diarrhea     Severe diarrhea  Severe diarrhea         Objective :  Temp:  [98.4 °F (36.9 °C)] 98.4 °F (36.9  °C)  HR:  [102] 102  BP: (136)/(62) 136/62  Resp:  [18] 18  SpO2:  [98 %] 98 %  O2 Device: None (Room air)    Physical Exam  Constitutional:       General: She is not in acute distress.     Appearance: Normal appearance. She is obese. She is not ill-appearing, toxic-appearing or diaphoretic.   HENT:      Head: Normocephalic.      Nose: Nose normal.      Mouth/Throat:      Mouth: Mucous membranes are moist.     Eyes:      Pupils: Pupils are equal, round, and reactive to light.       Cardiovascular:      Rate and Rhythm: Normal rate and regular rhythm.      Pulses: Normal pulses.      Heart sounds: Normal heart sounds. No murmur heard.     No friction rub. No gallop.   Pulmonary:      Effort: Pulmonary effort is normal. No respiratory distress.      Breath sounds: Normal breath sounds. No stridor. No wheezing, rhonchi or rales.   Chest:      Chest wall: No tenderness.   Abdominal:      General: Abdomen is flat. There is no distension.      Palpations: Abdomen is soft. There is no mass.      Tenderness: There is no abdominal tenderness. There is no right CVA tenderness, left CVA tenderness, guarding or rebound.      Hernia: No hernia is present.     Skin:     General: Skin is warm and dry.      Capillary Refill: Capillary refill takes less than 2 seconds.     Neurological:      General: No focal deficit present.      Mental Status: She is alert and oriented to person, place, and time. Mental status is at baseline.     Psychiatric:         Mood and Affect: Mood normal.         Behavior: Behavior normal.          Lines/Drains:            Lab Results: I have reviewed the following results:  Results from last 7 days   Lab Units 07/19/25  2304   WBC Thousand/uL 9.92   HEMOGLOBIN g/dL 10.7*   HEMATOCRIT % 31.5*   PLATELETS Thousands/uL 293   SEGS PCT % 59   LYMPHO PCT % 33   MONO PCT % 7   EOS PCT % 1     Results from last 7 days   Lab Units 07/19/25  2304   SODIUM mmol/L 132*   POTASSIUM mmol/L 3.9   CHLORIDE mmol/L 98    CO2 mmol/L 27   BUN mg/dL 41*   CREATININE mg/dL 0.67   ANION GAP mmol/L 7   CALCIUM mg/dL 9.4   ALBUMIN g/dL 3.8   TOTAL BILIRUBIN mg/dL 0.28   ALK PHOS U/L 70   ALT U/L 12   AST U/L 11*   GLUCOSE RANDOM mg/dL 170*     Results from last 7 days   Lab Units 07/20/25  0105   INR  1.00         Lab Results   Component Value Date    HGBA1C 7.4 (H) 02/20/2025    HGBA1C 7.8 (A) 01/24/2025    HGBA1C 7.2 (A) 07/19/2024                 Administrative Statements       ** Please Note: This note has been constructed using a voice recognition system. **         [1]   Past Medical History:  Diagnosis Date    Coronary artery disease 3/2019    triple bipass surgery    CPAP (continuous positive airway pressure) dependence     Diabetes mellitus (HCC)     Hyperlipidemia     Hypertension     Peripheral artery disease (HCC) 2018    Sleep apnea    [2]   Past Surgical History:  Procedure Laterality Date    CHOLECYSTECTOMY      COLONOSCOPY      CORONARY ARTERY BYPASS GRAFT  03/2019    x 3   [3]   Social History  Tobacco Use    Smoking status: Never    Smokeless tobacco: Never   Vaping Use    Vaping status: Never Used   Substance and Sexual Activity    Alcohol use: Not Currently     Alcohol/week: 1.0 standard drink of alcohol     Types: 1 Glasses of wine per week     Comment: rarely     Drug use: Never

## 2025-07-20 NOTE — ASSESSMENT & PLAN NOTE
CT abdomen negative for intraperitoneal bleed  Keep n.p.o.  Follow-up FOBT  Gentle IV fluid hydration with NS at 50 cc/h due to history of CHF  Protonix 40 mg IV every 12 hours  Carafate  GI consult

## 2025-07-20 NOTE — PLAN OF CARE
Problem: Potential for Falls  Goal: Patient will remain free of falls  Description: INTERVENTIONS:  - Educate patient/family on patient safety including physical limitations  - Instruct patient to call for assistance with activity   - Consider consulting OT/PT to assist with strengthening/mobility based on AM PAC & JH-HLM score  - Consult OT/PT to assist with strengthening/mobility   - Keep Call bell within reach  - Keep bed low and locked with side rails adjusted as appropriate  - Keep care items and personal belongings within reach  - Initiate and maintain comfort rounds  - Make Fall Risk Sign visible to staff  - Offer Toileting every 2 Hours, in advance of need  - Initiate/Maintain bed/chair alarm  - Obtain necessary fall risk management equipment:   - Apply yellow socks and bracelet for high fall risk patients  - Consider moving patient to room near nurses station  Outcome: Progressing     Problem: PAIN - ADULT  Goal: Verbalizes/displays adequate comfort level or baseline comfort level  Description: Interventions:  - Encourage patient to monitor pain and request assistance  - Assess pain using appropriate pain scale  - Administer analgesics as ordered based on type and severity of pain and evaluate response  - Implement non-pharmacological measures as appropriate and evaluate response  - Consider cultural and social influences on pain and pain management  - Notify physician/advanced practitioner if interventions unsuccessful or patient reports new pain  - Educate patient/family on pain management process including their role and importance of  reporting pain   - Provide non-pharmacologic/complimentary pain relief interventions  Outcome: Progressing     Problem: DISCHARGE PLANNING  Goal: Discharge to home or other facility with appropriate resources  Description: INTERVENTIONS:  - Identify barriers to discharge w/patient and caregiver  - Arrange for needed discharge resources and transportation as  appropriate  - Identify discharge learning needs (meds, wound care, etc.)  - Arrange for interpretive services to assist at discharge as needed  - Refer to Case Management Department for coordinating discharge planning if the patient needs post-hospital services based on physician/advanced practitioner order or complex needs related to functional status, cognitive ability, or social support system  Outcome: Progressing     Problem: Knowledge Deficit  Goal: Patient/family/caregiver demonstrates understanding of disease process, treatment plan, medications, and discharge instructions  Description: Complete learning assessment and assess knowledge base.  Interventions:  - Provide teaching at level of understanding  - Provide teaching via preferred learning methods  Outcome: Progressing     Problem: GASTROINTESTINAL - ADULT  Goal: Maintains or returns to baseline bowel function  Description: INTERVENTIONS:  - Assess bowel function  - Encourage oral fluids to ensure adequate hydration  - Administer IV fluids if ordered to ensure adequate hydration  - Administer ordered medications as needed  - Encourage mobilization and activity  - Consider nutritional services referral to assist patient with adequate nutrition and appropriate food choices  Outcome: Progressing     Problem: HEMATOLOGIC - ADULT  Goal: Maintains hematologic stability  Description: INTERVENTIONS  - Assess for signs and symptoms of bleeding or hemorrhage  - Monitor labs  - Administer supportive blood products/factors as ordered and appropriate  Outcome: Progressing

## 2025-07-20 NOTE — ASSESSMENT & PLAN NOTE
Wt Readings from Last 3 Encounters:   04/27/25 105 kg (230 lb 13.2 oz)   04/23/25 103 kg (226 lb)   03/13/25 103 kg (226 lb)     Most recent echo showed diastolic dysfunction  Does not use Lasix anymore , reports it dehydrates her   Monitor volume status carefully while on fluids  Metoprolol on hold

## 2025-07-20 NOTE — ASSESSMENT & PLAN NOTE
Episode of melena yesterday  Hemodynamically stable  Baseline hemoglobin on 2/20/2025 was 14.4 with an MCV of 93.3  Hemoglobin on 7/20/2025 10.3, MCV 90  BUN 36, creatinine 0.6; suggesting an upper GI bleed source  The differential diagnosis includes esophagitis, gastritis, duodenitis, peptic ulcer disease, angioectasia, Dieulafoy lesion, neoplasm  CT AP 7/20/2025, personally viewed and interpreted demonstrates colonic diverticulosis as well as renal cysts  IV pantoprazole 40 mg every 12 hours  N.p.o. after midnight  EGD tomorrow

## 2025-07-20 NOTE — ASSESSMENT & PLAN NOTE
Holding amlodipine, losartan, hydrochlorothiazide, metoprolol while n.p.o.  Labetalol IV push as needed for elevated blood pressures

## 2025-07-20 NOTE — ASSESSMENT & PLAN NOTE
Hgb is 10 and last few months ago was 14   Follow-up iron panel, LDH, haptoglobin  H&H every 6 hours  Transfuse if hemoglobin drops below 7

## 2025-07-20 NOTE — ASSESSMENT & PLAN NOTE
"Lab Results   Component Value Date    HGBA1C 7.4 (H) 02/20/2025       No results for input(s): \"POCGLU\" in the last 72 hours.    Blood Sugar Average: Last 72 hrs:  Uses Tresiba 52 units nightly, here on hold while NPO  Insulin sliding scale with fingersticks every 6 hours while NPO  Resume diabetic diet once able to as per GI  "

## 2025-07-20 NOTE — PLAN OF CARE
Problem: Potential for Falls  Goal: Patient will remain free of falls  Description: INTERVENTIONS:  - Educate patient/family on patient safety including physical limitations  - Instruct patient to call for assistance with activity   - Consider consulting OT/PT to assist with strengthening/mobility based on AM PAC & JH-HLM score  - Consult OT/PT to assist with strengthening/mobility   - Keep Call bell within reach  - Keep bed low and locked with side rails adjusted as appropriate  - Keep care items and personal belongings within reach  - Initiate and maintain comfort rounds  - Make Fall Risk Sign visible to staff  - Offer Toileting every 2 Hours, in advance of need  - Initiate/Maintain bed alarm  - Obtain necessary fall risk management equipment:   - Apply yellow socks and bracelet for high fall risk patients  - Consider moving patient to room near nurses station  Outcome: Progressing     Problem: PAIN - ADULT  Goal: Verbalizes/displays adequate comfort level or baseline comfort level  Description: Interventions:  - Encourage patient to monitor pain and request assistance  - Assess pain using appropriate pain scale  - Administer analgesics as ordered based on type and severity of pain and evaluate response  - Implement non-pharmacological measures as appropriate and evaluate response  - Consider cultural and social influences on pain and pain management  - Notify physician/advanced practitioner if interventions unsuccessful or patient reports new pain  - Educate patient/family on pain management process including their role and importance of  reporting pain   - Provide non-pharmacologic/complimentary pain relief interventions  Outcome: Progressing     Problem: DISCHARGE PLANNING  Goal: Discharge to home or other facility with appropriate resources  Description: INTERVENTIONS:  - Identify barriers to discharge w/patient and caregiver  - Arrange for needed discharge resources and transportation as appropriate  -  Identify discharge learning needs (meds, wound care, etc.)  - Arrange for interpretive services to assist at discharge as needed  - Refer to Case Management Department for coordinating discharge planning if the patient needs post-hospital services based on physician/advanced practitioner order or complex needs related to functional status, cognitive ability, or social support system  Outcome: Progressing     Problem: Knowledge Deficit  Goal: Patient/family/caregiver demonstrates understanding of disease process, treatment plan, medications, and discharge instructions  Description: Complete learning assessment and assess knowledge base.  Interventions:  - Provide teaching at level of understanding  - Provide teaching via preferred learning methods  Outcome: Progressing     Problem: GASTROINTESTINAL - ADULT  Goal: Maintains or returns to baseline bowel function  Description: INTERVENTIONS:  - Assess bowel function  - Encourage oral fluids to ensure adequate hydration  - Administer IV fluids if ordered to ensure adequate hydration  - Administer ordered medications as needed  - Encourage mobilization and activity  - Consider nutritional services referral to assist patient with adequate nutrition and appropriate food choices  Outcome: Progressing     Problem: HEMATOLOGIC - ADULT  Goal: Maintains hematologic stability  Description: INTERVENTIONS  - Assess for signs and symptoms of bleeding or hemorrhage  - Monitor labs  - Administer supportive blood products/factors as ordered and appropriate  Outcome: Progressing

## 2025-07-20 NOTE — TREATMENT PLAN
HPI from colleague reviewed, I have seen and examined the patient AM during rounds, agree with HPI with addition of:    Britni is an 83yo F who presented with episodes of dark stools.    She is also noted to be anemic with a hemoglobin of 10.1 currently.  Hemoglobin back in February was 14.  She does mention fatigue but is otherwise hemodynamically stable.    The of the abdomen without any intra abdominal bleed    She denies any abdominal pain.  Denies any fever    Physical Exam:  General: Elderly female in bed, awake, alert and oriented. Appears stated age.   Head: Normocephalic, atraumatic  Neck: Supple, no masses  Cardiac: Normal S1, S2, no S3, no JVD  Respiratory: Normal work of breathing, normal chest wall expansion, breathing sounds present bilateraly with no crackles rhonchi or wheezing  Abdominal: Soft, nontender, nondistended, bowel sounds present.  Extremities: No edema      Assessment:    Coffee-ground stools concerning for GI bleed  Anemia  Mild hyponatremia  History of CAD  Type 2 diabetes  Hypertension  Chronic constipation  Chronic diastolic heart failure    Plan:    Discussed with GI, plan for EGD tomorrow  N.p.o. after midnight  Monitor hemoglobin  Follow-up CT scan report  Check also iron panel, folate, B12  Monitor clinically for any signs of decompensation  Transfuse if hemoglobin less than 7 or symptomatic  Monitor blood glucose        Jak Colon MD  Internal Medicine - [facility]

## 2025-07-20 NOTE — ASSESSMENT & PLAN NOTE
Wt Readings from Last 3 Encounters:   07/20/25 103 kg (226 lb 9.6 oz)   04/27/25 105 kg (230 lb 13.2 oz)   04/23/25 103 kg (226 lb)

## 2025-07-20 NOTE — ED PROVIDER NOTES
Time reflects when diagnosis was documented in both MDM as applicable and the Disposition within this note       Time User Action Codes Description Comment    7/20/2025  2:43 AM Danii Lopez Add [K62.5] Rectal bleeding     7/20/2025  3:10 AM Danii Lopez Add [E87.1] Hyponatremia     7/20/2025  3:11 AM Radha Quiles Add [K92.1] Melena     7/20/2025  9:11 AM Mikayla Mike Add [D64.9] Anemia, unspecified type     7/21/2025  9:38 AM Masha Lorenzana Modify [K62.5] Rectal bleeding     7/21/2025  9:38 AM Masha Lorenzana Modify [E87.1] Hyponatremia     7/21/2025  9:38 AM Masha Lorenzana Modify [K92.1] Melena     7/21/2025  9:38 AM Masha Lorenzana Modify [D64.9] Anemia, unspecified type           ED Disposition       ED Disposition   Admit    Condition   Stable    Date/Time   Sun Jul 20, 2025  2:43 AM    Comment                  Assessment & Plan       Medical Decision Making  84 female is coming in with complaint of dark tar-like stools over the last day.  Has had some diffuse abdominal cramps and noticed that her stools were dark.  Has since then had been having fatigue.  She feels short of breath although that is more chronic and more of her baseline.  No vomiting or diarrhea at present she has had issues with constipation in the past.  Will evaluate for new dark stools and fatigue.  Patient is not on any blood thinners.  Will get labs to evaluate for fatigue and dark stools will get blood counts and coags along with type and screen.  Will also get renal function labs.  With new abdominal pain we will also get CT of abdomen to evaluate for any infectious process like diverticulitis or any surgical pathology.  Given age and new weakness and fatigue and dark stools may need admission for serial H&H and potential GI evaluation.    Amount and/or Complexity of Data Reviewed  Labs: ordered. Decision-making details documented in ED Course.  Radiology: ordered and independent interpretation  performed.    Risk  Prescription drug management.  Decision regarding hospitalization.        ED Course as of 07/25/25 1822   Sun Jul 20, 2025   0202 Hemoglobin(!): 10.7       Medications   sodium chloride 0.9 % infusion (has no administration in time range)   iohexol (OMNIPAQUE) 350 MG/ML injection (MULTI-DOSE) 100 mL (100 mL Intravenous Given 7/20/25 0120)       ED Risk Strat Scores                    No data recorded        SBIRT 22yo+      Flowsheet Row Most Recent Value   Initial Alcohol Screen: US AUDIT-C     1. How often do you have a drink containing alcohol? 0 Filed at: 07/19/2025 2254   2. How many drinks containing alcohol do you have on a typical day you are drinking?  0 Filed at: 07/19/2025 2254   3b. FEMALE Any Age, or MALE 65+: How often do you have 4 or more drinks on one occassion? 0 Filed at: 07/19/2025 2254   Audit-C Score 0 Filed at: 07/19/2025 2254   MONALISA: How many times in the past year have you...    Used an illegal drug or used a prescription medication for non-medical reasons? Never Filed at: 07/19/2025 2254                            History of Present Illness       Chief Complaint   Patient presents with    Rectal Bleeding     Pt states when she had a bowel movement this evening around 2000 she noticed her stool was black and tarry.        Past Medical History[1]   Past Surgical History[2]   Family History[3]   Social History[4]   E-Cigarette/Vaping    E-Cigarette Use Never User       E-Cigarette/Vaping Substances    Nicotine No     THC No     CBD No     Flavoring No     Other No     Unknown No       I have reviewed and agree with the history as documented.       History provided by:  Patient  Black or Bloody Stool  Quality:  Black and tarry  Amount:  Moderate  Duration:  1 day  Timing:  Constant  Chronicity:  New  Context: defecation    Similar prior episodes: no    Relieved by:  Nothing  Worsened by:  Nothing  Ineffective treatments:  None tried  Associated symptoms: abdominal pain     Associated symptoms: no loss of consciousness, no recent illness and no vomiting        Review of Systems   Gastrointestinal:  Positive for abdominal pain and blood in stool. Negative for vomiting.   Neurological:  Negative for loss of consciousness.   All other systems reviewed and are negative.          Objective       ED Triage Vitals   Temperature Pulse Blood Pressure Respirations SpO2 Patient Position - Orthostatic VS   07/19/25 2253 07/19/25 2253 07/19/25 2253 07/19/25 2253 07/19/25 2253 07/19/25 2253   98.4 °F (36.9 °C) 102 136/62 18 98 % Sitting      Temp Source Heart Rate Source BP Location FiO2 (%) Pain Score    07/19/25 2253 07/19/25 2253 07/19/25 2253 -- 07/20/25 0339    Oral Monitor Left arm  No Pain      Vitals      Date and Time Temp Pulse SpO2 Resp BP Pain Score FACES Pain Rating User   07/22/25 0938 -- -- -- -- -- 5 -- ML   07/22/25 0700 97.6 °F (36.4 °C) -- -- 22 145/60 2 -- SS   07/22/25 0333 -- -- -- -- -- 5 -- Y   07/22/25 0100 -- -- 94 % -- -- No Pain -- YH   07/21/25 2202 98.6 °F (37 °C) 87 95 % -- 132/41 -- -- DII   07/21/25 2119 -- -- -- -- -- 6 -- Y   07/21/25 1844 -- 95 96 % -- 153/51 -- -- DII   07/21/25 1530 99.4 °F (37.4 °C) 92 94 % -- 141/60 -- -- DII   07/21/25 1310 -- -- -- -- -- 8 -- ML   07/21/25 0953 -- 90 96 % 23 163/72 -- -- DNS   07/21/25 0943 98.7 °F (37.1 °C) 86 100 % 21 146/70 -- -- DNS   07/21/25 0832 97.1 °F (36.2 °C) 89 95 % 16 135/61 2 all over, generalized -- GN   07/21/25 0746 97.1 °F (36.2 °C) -- -- 18 -- 8 -- SS   07/21/25 0531 -- -- -- -- -- 3 -- BES   07/20/25 2232 98.9 °F (37.2 °C) 102 97 % 18 148/70 -- -- DII   07/20/25 1911 -- -- -- -- -- 3 -- BES   07/20/25 1354 -- 110 94 % -- 139/61 -- -- DII   07/20/25 1251 -- -- -- -- -- 6 -- AC   07/20/25 0933 -- -- -- -- -- 2 -- AC   07/20/25 0728 99 °F (37.2 °C) 105 96 % -- 166/58 -- -- DII   07/20/25 0350 -- -- -- -- -- No Pain -- BES   07/20/25 0341 97.8 °F (36.6 °C) 98 97 % 18 143/83 -- -- DII   07/20/25 0339 -- --  -- -- -- No Pain -- BES   07/19/25 2253 98.4 °F (36.9 °C) 102 98 % 18 136/62 -- -- JK            Physical Exam  Vitals and nursing note reviewed.   Constitutional:       General: She is not in acute distress.     Appearance: She is well-developed. She is not diaphoretic.   HENT:      Head: Normocephalic and atraumatic.      Nose: Nose normal.     Eyes:      Extraocular Movements: Extraocular movements intact.      Conjunctiva/sclera: Conjunctivae normal.       Cardiovascular:      Rate and Rhythm: Normal rate and regular rhythm.      Heart sounds: Normal heart sounds.   Pulmonary:      Effort: Pulmonary effort is normal. No respiratory distress.      Breath sounds: Normal breath sounds.   Abdominal:      General: There is no distension.      Palpations: Abdomen is soft.      Tenderness: There is no abdominal tenderness.     Musculoskeletal:         General: No deformity. Normal range of motion.      Cervical back: Neck supple.     Skin:     General: Skin is warm and dry.      Coloration: Skin is pale.     Neurological:      General: No focal deficit present.      Mental Status: She is alert and oriented to person, place, and time.      Cranial Nerves: No cranial nerve deficit.     Psychiatric:         Mood and Affect: Mood normal.         Results Reviewed       Procedure Component Value Units Date/Time    Haptoglobin [[id number]] Collected: 07/20/25 0514    Lab Status: Final result Specimen: Blood from Hand, Right Updated: 07/22/25 0605     Haptoglobin 184 mg/dL     Narrative:      Performed at:  [address] [place] [address]  [id number]  : Lorene Cam MD, Phone:  [phone]    TIBC Panel (incl. Iron, TIBC, % Iron Saturation) [[id number]]  (Abnormal) Collected: 07/20/25 0105    Lab Status: Final result Specimen: Blood from Arm, Left Updated: 07/20/25 1542     Iron Saturation 15 %      TIBC 425.6 ug/dL      Iron 64 ug/dL      Transferrin 304 mg/dL      UIBC 362 ug/dL     Ferritin  "[[id number]]  (Normal) Collected: 07/20/25 0105    Lab Status: Final result Specimen: Blood from Arm, Left Updated: 07/20/25 1542     Ferritin 30 ng/mL     Narrative:      \"Guideline based recommendations vary for ferritin cutoff values in defining iron deficiency; additionally, cutoff levels for ferritin may differ depending on the condition. For example, a higher minimum ferritin is typically preferred in adults with restless leg syndrome, where a target of > 75 ng/ml is sought. Ultimately, clinical correlation is needed in determining actionable minimum ferritin level.\"    Hemoglobin A1c w/EAG Estimation (Prechecked if no A1C within 90 days) [[id number]]  (Abnormal) Collected: 07/20/25 0514    Lab Status: Final result Specimen: Blood from Hand, Right Updated: 07/20/25 1508     Hemoglobin A1C 7.4 %       mg/dl     Hemoglobin and hematocrit, blood [[id number]]  (Abnormal) Collected: 07/20/25 0840    Lab Status: Final result Specimen: Blood from Arm, Left Updated: 07/20/25 0847     Hemoglobin 10.1 g/dL      Hematocrit 30.5 %     HS Troponin I 4hr [[id number]]  (Normal) Collected: 07/20/25 0514    Lab Status: Final result Specimen: Blood from Hand, Right Updated: 07/20/25 0606     hs TnI 4hr 5 ng/L      Delta 4hr hsTnI 0 ng/L     Basic metabolic panel [[id number]]  (Abnormal) Collected: 07/20/25 0514    Lab Status: Final result Specimen: Blood from Hand, Right Updated: 07/20/25 0556     Sodium 133 mmol/L      Potassium 3.6 mmol/L      Chloride 99 mmol/L      CO2 26 mmol/L      ANION GAP 8 mmol/L      BUN 36 mg/dL      Creatinine 0.61 mg/dL      Glucose 159 mg/dL      Glucose, Fasting 159 mg/dL      Calcium 9.3 mg/dL      eGFR 84 ml/min/1.73sq m     Narrative:      National Kidney Disease Foundation guidelines for Chronic Kidney Disease (CKD):     Stage 1 with normal or high GFR (GFR > 90 mL/min/1.73 square meters)    Stage 2 Mild CKD (GFR = 60-89 mL/min/1.73 square meters)    Stage 3A Moderate CKD (GFR = 45-59 " mL/min/1.73 square meters)    Stage 3B Moderate CKD (GFR = 30-44 mL/min/1.73 square meters)    Stage 4 Severe CKD (GFR = 15-29 mL/min/1.73 square meters)    Stage 5 End Stage CKD (GFR <15 mL/min/1.73 square meters)  Note: GFR calculation is accurate only with a steady state creatinine    CBC (With Platelets) [[id number]]  (Abnormal) Collected: 07/20/25 0514    Lab Status: Final result Specimen: Blood from Hand, Right Updated: 07/20/25 0534     WBC 9.73 Thousand/uL      RBC 3.45 Million/uL      Hemoglobin 10.3 g/dL      Hematocrit 31.0 %      MCV 90 fL      MCH 29.9 pg      MCHC 33.2 g/dL      RDW 13.7 %      Platelets 278 Thousands/uL      MPV 10.6 fL     Lactate dehydrogenase [[id number]]  (Abnormal) Collected: 07/20/25 0305    Lab Status: Final result Specimen: Blood from Arm, Left Updated: 07/20/25 0443      U/L     HS Troponin I 2hr [[id number]]  (Normal) Collected: 07/20/25 0305    Lab Status: Final result Specimen: Blood from Arm, Left Updated: 07/20/25 0341     hs TnI 2hr 5 ng/L      Delta 2hr hsTnI 0 ng/L     iFOBT (FIT) [[id number]]     Lab Status: No result Specimen: Stool     TSH, 3rd generation with Free T4 reflex [[id number]]  (Normal) Collected: 07/20/25 0105    Lab Status: Final result Specimen: Blood from Arm, Left Updated: 07/20/25 0214     TSH 3RD GENERATION 1.119 uIU/mL     HS Troponin 0hr (reflex protocol) [[id number]]  (Normal) Collected: 07/20/25 0105    Lab Status: Final result Specimen: Blood from Arm, Left Updated: 07/20/25 0150     hs TnI 0hr 5 ng/L     Protime-INR [[id number]]  (Normal) Collected: 07/20/25 0105    Lab Status: Final result Specimen: Blood from Arm, Left Updated: 07/20/25 0127     Protime 13.9 seconds      INR 1.00    Narrative:      INR Therapeutic Range    Indication                                             INR Range      Atrial Fibrillation                                               2.0-3.0  Hypercoagulable State                                    2.0.2.3  Left  Ventricular Asist Device                            2.0-3.0  Mechanical Heart Valve                                  -    Aortic(with afib, MI, embolism, HF, LA enlargement,    and/or coagulopathy)                                     2.0-3.0 (2.5-3.5)     Mitral                                                             2.5-3.5  Prosthetic/Bioprosthetic Heart Valve               2.0-3.0  Venous thromboembolism (VTE: VT, PE        2.0-3.0    APTT [[id number]]  (Normal) Collected: 07/20/25 0105    Lab Status: Final result Specimen: Blood from Arm, Left Updated: 07/20/25 0127     PTT 29 seconds     Lipase [[id number]]  (Normal) Collected: 07/19/25 2304    Lab Status: Final result Specimen: Blood from Hand, Left Updated: 07/19/25 2337     Lipase 22 u/L     Comprehensive metabolic panel [[id number]]  (Abnormal) Collected: 07/19/25 2304    Lab Status: Final result Specimen: Blood from Hand, Left Updated: 07/19/25 2337     Sodium 132 mmol/L      Potassium 3.9 mmol/L      Chloride 98 mmol/L      CO2 27 mmol/L      ANION GAP 7 mmol/L      BUN 41 mg/dL      Creatinine 0.67 mg/dL      Glucose 170 mg/dL      Calcium 9.4 mg/dL      AST 11 U/L      ALT 12 U/L      Alkaline Phosphatase 70 U/L      Total Protein 5.8 g/dL      Albumin 3.8 g/dL      Total Bilirubin 0.28 mg/dL      eGFR 82 ml/min/1.73sq m     Narrative:      National Kidney Disease Foundation guidelines for Chronic Kidney Disease (CKD):     Stage 1 with normal or high GFR (GFR > 90 mL/min/1.73 square meters)    Stage 2 Mild CKD (GFR = 60-89 mL/min/1.73 square meters)    Stage 3A Moderate CKD (GFR = 45-59 mL/min/1.73 square meters)    Stage 3B Moderate CKD (GFR = 30-44 mL/min/1.73 square meters)    Stage 4 Severe CKD (GFR = 15-29 mL/min/1.73 square meters)    Stage 5 End Stage CKD (GFR <15 mL/min/1.73 square meters)  Note: GFR calculation is accurate only with a steady state creatinine    CBC and differential [[id number]]  (Abnormal) Collected: 07/19/25 2304    Lab  Status: Final result Specimen: Blood from Hand, Left Updated: 07/19/25 2315     WBC 9.92 Thousand/uL      RBC 3.58 Million/uL      Hemoglobin 10.7 g/dL      Hematocrit 31.5 %      MCV 88 fL      MCH 29.9 pg      MCHC 34.0 g/dL      RDW 13.5 %      MPV 10.5 fL      Platelets 293 Thousands/uL      nRBC 0 /100 WBCs      Segmented % 59 %      Immature Grans % 0 %      Lymphocytes % 33 %      Monocytes % 7 %      Eosinophils Relative 1 %      Basophils Relative 0 %      Absolute Neutrophils 5.87 Thousands/µL      Absolute Immature Grans 0.02 Thousand/uL      Absolute Lymphocytes 3.30 Thousands/µL      Absolute Monocytes 0.65 Thousand/µL      Eosinophils Absolute 0.05 Thousand/µL      Basophils Absolute 0.03 Thousands/µL             RADIOLOGY RESULTS   Final Interpretation by  (07/24 0921)      CT abdomen pelvis with contrast   Final Interpretation by Myesha Crabtree MD (07/20 3960)      Liquid consistency stool throughout the colon, correlate with diarrheal illness.   No bowel wall thickening   No bowel obstruction   Findings are consistent with the preliminary report from [facility] which was provided shortly after completion of the exam.         Workstation performed: [id number]         XR chest 1 view portable   ED Interpretation by Danii Lopez MD (07/20 0118)   NAD      Final Interpretation by Nahed Worthy MD (07/20 7326)      No acute cardiopulmonary disease.            Workstation performed: [id number]             ECG 12 Lead Documentation Only    Date/Time: 7/20/2025 2:16 AM    Performed by: Danii Lopez MD  Authorized by: Danii Lopez MD    Indications / Diagnosis:  Fatigue  ECG reviewed by me, the ED Provider: yes    Patient location:  ED  Rate:     ECG rate:  100    ECG rate assessment: normal    Rhythm:     Rhythm: sinus rhythm    Conduction:     Conduction: abnormal      Abnormal conduction: complete RBBB    ST segments:     ST segments:  Normal  T waves:     T waves:  normal        ED Medication and Procedure Management   Prior to Admission Medications   Prescriptions Last Dose Informant Patient Reported? Taking?   Accu-Chek FastClix Lancets MISC  Self Yes No   Sig: Use   B Complex-Biotin-FA ( Vitamin B100 Complex) TABS Unknown Self Yes No   Sig: Take by mouth   B-D ULTRAFINE III SHORT PEN 31G X 8 MM MISC Unknown Self Yes No   Sig: in the morning and at noon and in the evening and before bedtime.   Blood Glucose Monitoring Suppl (Accu-Chek Guide) w/Device KIT Unknown Self Yes No   Sig: Use   Probiotic Product (Align) 4 MG CAPS Unknown Self Yes No   Sig: Take 1 capsule by mouth   Restasis 0.05 % ophthalmic emulsion Unknown Self Yes No   amLODIPine (NORVASC) 5 mg tablet Unknown Self Yes No   Sig: Take 5 mg by mouth in the morning.   ascorbic Acid (VITAMIN C) 500 MG CPCR Unknown Self Yes No   bisacodyl 5 MG EC tablet Unknown Self No No   Sig: TAKE 1 TABLET (5 MG TOTAL) BY MOUTH IN THE MORNING   cilostazol (PLETAL) 100 mg tablet Unknown Self No No   Sig: TAKE 1 TABLET BY MOUTH TWICE A DAY   furosemide (LASIX) 20 mg tablet Not Taking Self No No   Sig: Take 1 tablet (20 mg total) by mouth 2 (two) times a day   Patient not taking: Reported on 7/20/2025   glucose blood (Accu-Chek Guide) test strip Unknown Self Yes No   hydrOXYzine HCL (ATARAX) 50 mg tablet Unknown Self No No   Sig: TAKE 1 TABLET BY MOUTH DAILY AT BEDTIME   insulin aspart (NovoLOG FlexPen ReliOn) 100 UNIT/ML injection pen Unknown Self Yes No   insulin degludec (Tresiba FlexTouch) 100 units/mL injection pen Unknown Self Yes No   Sig: Inject 52 Units under the skin daily at bedtime   ipratropium (ATROVENT) 0.06 % nasal spray Unknown Self Yes No   linaCLOtide 72 MCG CAPS Unknown Self No No   Sig: Take 72 mcg by mouth daily at least 30 minutes prior to the first meal of the day   losartan-hydrochlorothiazide (HYZAAR) 100-25 MG per tablet Unknown Self Yes No   Sig: Take 1 tablet by mouth in the morning.   magnesium hydroxide  (MILK OF MAGNESIA) 400 mg/5 mL oral suspension Unknown Self Yes No   Sig: Take by mouth daily as needed   meclizine (ANTIVERT) 25 mg tablet Unknown Self Yes No   Sig: Take by mouth as needed in the morning and as needed at noon and as needed in the evening for dizziness.   metoprolol tartrate (LOPRESSOR) 25 mg tablet Unknown Self No No   Sig: TAKE 1 TABLET BY MOUTH TWICE A DAY   nystatin (MYCOSTATIN) cream Unknown Self No No   Sig: APPLY TO AFFECTED AREA TWICE A DAY   ondansetron (ZOFRAN-ODT) 4 mg disintegrating tablet Unknown Self No No   Sig: Take 1 tablet (4 mg total) by mouth every 6 (six) hours as needed for nausea or vomiting   rosuvastatin (CRESTOR) 40 MG tablet Unknown Self No No   Sig: TAKE 1 TABLET BY MOUTH EVERY DAY   simethicone (MYLICON,GAS-X) 180 MG capsule Unknown Self No No   Sig: Take 1 capsule (180 mg total) by mouth every 6 (six) hours as needed for flatulence   sucralfate (CARAFATE) 1 g tablet Unknown Self No No   Sig: TAKE 1 TABLET BY ORAL ROUTE 4 TIMES EVERY DAY ON AN EMPTY STOMACH 1 HOUR BEFORE MEALS AND AT BEDTIME      Facility-Administered Medications: None     Discharge Medication List as of 7/22/2025  2:26 PM        START taking these medications    Details   pantoprazole (PROTONIX) 40 mg tablet Take 1 tablet (40 mg total) by mouth 2 (two) times a day before meals for 120 doses, Starting Tue 7/22/2025, Until Sat 9/20/2025, Normal           CONTINUE these medications which have NOT CHANGED    Details   Accu-Chek FastClix Lancets MISC Use, Historical Med      amLODIPine (NORVASC) 5 mg tablet Take 5 mg by mouth in the morning., Starting Sun 3/10/2024, Historical Med      ascorbic Acid (VITAMIN C) 500 MG CPCR Historical Med      B Complex-Biotin-FA (HM Vitamin B100 Complex) TABS Take by mouth, Historical Med      B-D ULTRAFINE III SHORT PEN 31G X 8 MM MISC in the morning and at noon and in the evening and before bedtime., Starting Wed 4/26/2023, Historical Med      bisacodyl 5 MG EC tablet  TAKE 1 TABLET (5 MG TOTAL) BY MOUTH IN THE MORNING, Starting Tue 7/15/2025, Normal      Blood Glucose Monitoring Suppl (Accu-Chek Guide) w/Device KIT Use, Historical Med      cilostazol (PLETAL) 100 mg tablet TAKE 1 TABLET BY MOUTH TWICE A DAY, Starting Thu 6/12/2025, Normal      glucose blood (Accu-Chek Guide) test strip Historical Med      hydrOXYzine HCL (ATARAX) 50 mg tablet TAKE 1 TABLET BY MOUTH DAILY AT BEDTIME, Starting Fri 4/4/2025, Normal      insulin aspart (NovoLOG FlexPen ReliOn) 100 UNIT/ML injection pen Historical Med      insulin degludec (Tresiba FlexTouch) 100 units/mL injection pen Inject 52 Units under the skin daily at bedtime, Historical Med      ipratropium (ATROVENT) 0.06 % nasal spray Starting Tue 11/29/2022, Historical Med      linaCLOtide 72 MCG CAPS Take 72 mcg by mouth daily at least 30 minutes prior to the first meal of the day, Starting Thu 5/8/2025, Normal      losartan-hydrochlorothiazide (HYZAAR) 100-25 MG per tablet Take 1 tablet by mouth in the morning., Historical Med      magnesium hydroxide (MILK OF MAGNESIA) 400 mg/5 mL oral suspension Take by mouth daily as needed, Historical Med      meclizine (ANTIVERT) 25 mg tablet Take by mouth as needed in the morning and as needed at noon and as needed in the evening for dizziness., Historical Med      metoprolol tartrate (LOPRESSOR) 25 mg tablet TAKE 1 TABLET BY MOUTH TWICE A DAY, Starting Sun 6/8/2025, Normal      nystatin (MYCOSTATIN) cream APPLY TO AFFECTED AREA TWICE A DAY, Starting Mon 2/5/2024, Normal      ondansetron (ZOFRAN-ODT) 4 mg disintegrating tablet Take 1 tablet (4 mg total) by mouth every 6 (six) hours as needed for nausea or vomiting, Starting Tue 4/8/2025, Normal      Probiotic Product (Align) 4 MG CAPS Take 1 capsule by mouth, Historical Med      Restasis 0.05 % ophthalmic emulsion Historical Med      rosuvastatin (CRESTOR) 40 MG tablet TAKE 1 TABLET BY MOUTH EVERY DAY, Starting Tue 4/8/2025, Normal      simethicone  (MYLICON,GAS-X) 180 MG capsule Take 1 capsule (180 mg total) by mouth every 6 (six) hours as needed for flatulence, Starting Sun 4/27/2025, Normal      sucralfate (CARAFATE) 1 g tablet TAKE 1 TABLET BY ORAL ROUTE 4 TIMES EVERY DAY ON AN EMPTY STOMACH 1 HOUR BEFORE MEALS AND AT BEDTIME, Starting Mon 3/17/2025, Normal      montelukast (SINGULAIR) 10 mg tablet TAKE 1 TABLET BY MOUTH EVERYDAY AT BEDTIME, Starting Fri 3/29/2024, Normal      polyethylene glycol (GOLYTELY) 4000 mL solution Take 4,000 mL by mouth once for 1 dose, Starting Sun 4/27/2025, Normal           STOP taking these medications       furosemide (LASIX) 20 mg tablet Comments:   Reason for Stopping:             No discharge procedures on file.  ED SEPSIS DOCUMENTATION   Time reflects when diagnosis was documented in both MDM as applicable and the Disposition within this note       Time User Action Codes Description Comment    7/20/2025  2:43 AM Danii Lopez Add [K62.5] Rectal bleeding     7/20/2025  3:10 AM Danii Lopez Add [E87.1] Hyponatremia     7/20/2025  3:11 AM Radha Quiles Add [K92.1] Melena     7/20/2025  9:11 AM Mikayla Mike Add [D64.9] Anemia, unspecified type     7/21/2025  9:38 AM Masha Lorenzana Modify [K62.5] Rectal bleeding     7/21/2025  9:38 AM Masha Lorenzana Modify [E87.1] Hyponatremia     7/21/2025  9:38 AM Masha Lorenzana Modify [K92.1] Melena     7/21/2025  9:38 AM Masha Lorenzana Modify [D64.9] Anemia, unspecified type                      [1]   Past Medical History:  Diagnosis Date    Coronary artery disease 3/2019    triple bipass surgery    CPAP (continuous positive airway pressure) dependence     Diabetes mellitus (HCC)     Hyperlipidemia     Hypertension     Peripheral artery disease (HCC) 2018    Sleep apnea     CPAP occasionally   [2]   Past Surgical History:  Procedure Laterality Date    CHOLECYSTECTOMY      COLONOSCOPY      CORONARY ARTERY BYPASS GRAFT  03/2019    x 3   [3]   Family History  Problem  Relation Name Age of Onset    Heart disease Mother jostin meier     Diabetes Mother jostin meier     Hypertension Mother jostin meier     Heart disease Father Humble Meier     Hypertension Father Humble Meier     Diabetes Father Humble Meier     Heart failure Brother Feliciano eMier Sr          of heart failure   [4]   Social History  Tobacco Use    Smoking status: Never     Passive exposure: Never    Smokeless tobacco: Never   Vaping Use    Vaping status: Never Used   Substance Use Topics    Alcohol use: Not Currently     Alcohol/week: 1.0 standard drink of alcohol     Types: 1 Glasses of wine per week     Comment: rarely     Drug use: Never        Danii Lopez MD  25 1958

## 2025-07-20 NOTE — CONSULTS
Consultation - Gastroenterology   Name: Britni Curran 84 y.o. female I MRN: [id number]  Unit/Bed#: 2 E 284-01 I Date of Admission: 7/20/2025   Date of Service: 7/20/2025 I Hospital Day: 0   Consults  Physician Requesting Evaluation: Jak Seth,*   Reason for Evaluation / Principal Problem: Melena    Assessment & Plan  GI bleed  Episode of melena yesterday  Hemodynamically stable  Baseline hemoglobin on 2/20/2025 was 14.4 with an MCV of 93.3  Hemoglobin on 7/20/2025 10.3, MCV 90  BUN 36, creatinine 0.6; suggesting an upper GI bleed source  The differential diagnosis includes esophagitis, gastritis, duodenitis, peptic ulcer disease, angioectasia, Dieulafoy lesion, neoplasm  CT AP 7/20/2025, personally viewed and interpreted demonstrates colonic diverticulosis as well as renal cysts  IV pantoprazole 40 mg every 12 hours  N.p.o. after midnight  EGD tomorrow  Coronary artery disease involving coronary bypass graft of native heart without angina pectoris    Type 2 diabetes mellitus without complication, with long-term current use of insulin (HCC)  Lab Results   Component Value Date    HGBA1C 7.4 (H) 02/20/2025       Recent Labs     07/20/25  0549   POCGLU 166*       Blood Sugar Average: Last 72 hrs:  (P) 166    Essential hypertension    Chronic constipation  Continue MiraLAX daily and milk of magnesia as needed  Hx of CABG    Diastolic CHF (HCC)  Wt Readings from Last 3 Encounters:   07/20/25 103 kg (226 lb 9.6 oz)   04/27/25 105 kg (230 lb 13.2 oz)   04/23/25 103 kg (226 lb)       Anemia  Hemoglobin 10.3, MCV 90  Iron studies pending      History of Present Illness   HPI:  Britni Curran is a 84 y.o. female who presents with a PMH.  Of DM, CAD status post CABG, HTN, PAD, diastolic heart failure, FILIBERTO presented to the ED at [facility] with a complaint of melena which began yesterday.  This was associated with a gassy upper abdominal discomfort, belching, and bloating.  She denied abdominal  pain, nausea, vomiting.  There was no hematemesis.  She admits to chronic constipation for which she uses MiraLAX and milk of magnesia on a frequent basis.    Baseline hemoglobin level in February 2025 was 14.4.  Her admission hemoglobin level is 10.3.    The patient was seen in my office back in February 2025 and the plan at that time was to perform an EGD.  She came in for the procedure but due to shortness of breath, the procedure was canceled by anesthesiology.    She does suffer from chronic constipation for which she takes MiraLAX on a frequent basis and milk of magnesia as needed.      Objective :  Temp:  [97.8 °F (36.6 °C)-99 °F (37.2 °C)] 99 °F (37.2 °C)  HR:  [] 105  BP: (136-166)/(58-83) 166/58  Resp:  [18] 18  SpO2:  [96 %-98 %] 96 %  O2 Device: None (Room air)    Physical Exam  Vitals reviewed.   Constitutional:       General: She is not in acute distress.     Appearance: Normal appearance. She is obese. She is not ill-appearing.     Eyes:      General: No scleral icterus.     Extraocular Movements: Extraocular movements intact.      Pupils: Pupils are equal, round, and reactive to light.       Cardiovascular:      Rate and Rhythm: Normal rate and regular rhythm.      Pulses: Normal pulses.      Heart sounds: Normal heart sounds. No murmur heard.  Pulmonary:      Effort: Pulmonary effort is normal.      Breath sounds: Normal breath sounds. No wheezing, rhonchi or rales.   Abdominal:      Palpations: Abdomen is soft. There is no mass.      Tenderness: There is no abdominal tenderness. There is no guarding or rebound.     Musculoskeletal:      Right lower leg: No edema.      Left lower leg: No edema.     Skin:     General: Skin is warm and dry.      Coloration: Skin is not jaundiced.      Findings: No rash.     Neurological:      General: No focal deficit present.      Mental Status: She is alert and oriented to person, place, and time.     Psychiatric:         Mood and Affect: Mood normal.          "Behavior: Behavior normal.         Lab Results: I have reviewed the following results:CBC/BMP:   .     07/20/25  0514 07/20/25  0840   WBC 9.73  --    HGB 10.3* 10.1*   HCT 31.0* 30.5*     --    SODIUM 133*  --    K 3.6  --    CL 99  --    CO2 26  --    BUN 36*  --    CREATININE 0.61  --    GLUC 159*  --     , LFTs:   .     07/19/25  2304   AST 11*   ALT 12   ALB 3.8   TBILI 0.28   ALKPHOS 70    , PTT/INR:  .     07/20/25  0105   PTT 29   INR 1.00    , Troponin,BNP:  .     07/20/25  0105 07/20/25  0305   HSTNI0 5  --    HSTNI2  --  5    , Lactic Acid: No new results in last 24 hours. , Lipase:   Lab Results   Component Value Date    LIPASE 22 07/19/2025   , Urinalysis: No results found for: \"COLORU\", \"CLARITYU\", \"SPECGRAV\", \"PHUR\", \"LEUKOCYTESUR\", \"NITRITE\", \"PROTEINUA\", \"GLUCOSEU\", \"KETONESU\", \"BILIRUBINUR\", \"BLOODU\"    Imaging Results Review: I personally reviewed the following image studies/reports in PACS and discussed pertinent findings with Radiology: CT abdomen/pelvis. My interpretation of the radiology images/reports is: Written above.        "

## 2025-07-21 ENCOUNTER — APPOINTMENT (OUTPATIENT)
Dept: GASTROENTEROLOGY | Facility: HOSPITAL | Age: 85
DRG: 378 | End: 2025-07-21
Attending: PHYSICIAN ASSISTANT
Payer: MEDICARE

## 2025-07-21 ENCOUNTER — ANESTHESIA EVENT (OUTPATIENT)
Dept: GASTROENTEROLOGY | Facility: HOSPITAL | Age: 85
DRG: 378 | End: 2025-07-21
Payer: MEDICARE

## 2025-07-21 ENCOUNTER — ANESTHESIA (OUTPATIENT)
Dept: GASTROENTEROLOGY | Facility: HOSPITAL | Age: 85
DRG: 378 | End: 2025-07-21
Payer: MEDICARE

## 2025-07-21 LAB
ALBUMIN SERPL BCG-MCNC: 3.8 G/DL (ref 3.5–5)
ALP SERPL-CCNC: 64 U/L (ref 34–104)
ALT SERPL W P-5'-P-CCNC: 12 U/L (ref 7–52)
ANION GAP SERPL CALCULATED.3IONS-SCNC: 7 MMOL/L (ref 4–13)
AST SERPL W P-5'-P-CCNC: 14 U/L (ref 13–39)
BASOPHILS # BLD AUTO: 0.04 THOUSANDS/ÂΜL (ref 0–0.1)
BASOPHILS NFR BLD AUTO: 1 % (ref 0–1)
BILIRUB SERPL-MCNC: 0.3 MG/DL (ref 0.2–1)
BUN SERPL-MCNC: 20 MG/DL (ref 5–25)
CALCIUM SERPL-MCNC: 8.9 MG/DL (ref 8.4–10.2)
CHLORIDE SERPL-SCNC: 103 MMOL/L (ref 96–108)
CO2 SERPL-SCNC: 28 MMOL/L (ref 21–32)
CREAT SERPL-MCNC: 0.59 MG/DL (ref 0.6–1.3)
EOSINOPHIL # BLD AUTO: 0.1 THOUSAND/ÂΜL (ref 0–0.61)
EOSINOPHIL NFR BLD AUTO: 1 % (ref 0–6)
ERYTHROCYTE [DISTWIDTH] IN BLOOD BY AUTOMATED COUNT: 13.7 % (ref 11.6–15.1)
FOLATE SERPL-MCNC: >22.3 NG/ML
GFR SERPL CREATININE-BSD FRML MDRD: 85 ML/MIN/1.73SQ M
GLUCOSE P FAST SERPL-MCNC: 140 MG/DL (ref 65–99)
GLUCOSE SERPL-MCNC: 140 MG/DL (ref 65–140)
GLUCOSE SERPL-MCNC: 144 MG/DL (ref 65–140)
GLUCOSE SERPL-MCNC: 175 MG/DL (ref 65–140)
GLUCOSE SERPL-MCNC: 177 MG/DL (ref 65–140)
GLUCOSE SERPL-MCNC: 213 MG/DL (ref 65–140)
HCT VFR BLD AUTO: 31 % (ref 34.8–46.1)
HGB BLD-MCNC: 10 G/DL (ref 11.5–15.4)
IMM GRANULOCYTES # BLD AUTO: 0.03 THOUSAND/UL (ref 0–0.2)
IMM GRANULOCYTES NFR BLD AUTO: 0 % (ref 0–2)
LYMPHOCYTES # BLD AUTO: 3.11 THOUSANDS/ÂΜL (ref 0.6–4.47)
LYMPHOCYTES NFR BLD AUTO: 39 % (ref 14–44)
MCH RBC QN AUTO: 29.5 PG (ref 26.8–34.3)
MCHC RBC AUTO-ENTMCNC: 32.3 G/DL (ref 31.4–37.4)
MCV RBC AUTO: 91 FL (ref 82–98)
MONOCYTES # BLD AUTO: 0.64 THOUSAND/ÂΜL (ref 0.17–1.22)
MONOCYTES NFR BLD AUTO: 8 % (ref 4–12)
NEUTROPHILS # BLD AUTO: 4.12 THOUSANDS/ÂΜL (ref 1.85–7.62)
NEUTS SEG NFR BLD AUTO: 51 % (ref 43–75)
NRBC BLD AUTO-RTO: 0 /100 WBCS
PLATELET # BLD AUTO: 260 THOUSANDS/UL (ref 149–390)
PMV BLD AUTO: 10.5 FL (ref 8.9–12.7)
POTASSIUM SERPL-SCNC: 3.7 MMOL/L (ref 3.5–5.3)
PROT SERPL-MCNC: 6 G/DL (ref 6.4–8.4)
RBC # BLD AUTO: 3.39 MILLION/UL (ref 3.81–5.12)
SODIUM SERPL-SCNC: 138 MMOL/L (ref 135–147)
VIT B12 SERPL-MCNC: 311 PG/ML (ref 180–914)
WBC # BLD AUTO: 8.04 THOUSAND/UL (ref 4.31–10.16)

## 2025-07-21 PROCEDURE — 0DB18ZX EXCISION OF UPPER ESOPHAGUS, VIA NATURAL OR ARTIFICIAL OPENING ENDOSCOPIC, DIAGNOSTIC: ICD-10-PCS | Performed by: INTERNAL MEDICINE

## 2025-07-21 PROCEDURE — 88305 TISSUE EXAM BY PATHOLOGIST: CPT | Performed by: PATHOLOGY

## 2025-07-21 PROCEDURE — 43248 EGD GUIDE WIRE INSERTION: CPT | Performed by: INTERNAL MEDICINE

## 2025-07-21 PROCEDURE — 99233 SBSQ HOSP IP/OBS HIGH 50: CPT | Performed by: INTERNAL MEDICINE

## 2025-07-21 PROCEDURE — 85025 COMPLETE CBC W/AUTO DIFF WBC: CPT | Performed by: INTERNAL MEDICINE

## 2025-07-21 PROCEDURE — 82746 ASSAY OF FOLIC ACID SERUM: CPT | Performed by: INTERNAL MEDICINE

## 2025-07-21 PROCEDURE — 43239 EGD BIOPSY SINGLE/MULTIPLE: CPT | Performed by: INTERNAL MEDICINE

## 2025-07-21 PROCEDURE — 80053 COMPREHEN METABOLIC PANEL: CPT | Performed by: INTERNAL MEDICINE

## 2025-07-21 PROCEDURE — 82948 REAGENT STRIP/BLOOD GLUCOSE: CPT

## 2025-07-21 PROCEDURE — 0DB38ZX EXCISION OF LOWER ESOPHAGUS, VIA NATURAL OR ARTIFICIAL OPENING ENDOSCOPIC, DIAGNOSTIC: ICD-10-PCS | Performed by: INTERNAL MEDICINE

## 2025-07-21 PROCEDURE — 82607 VITAMIN B-12: CPT | Performed by: INTERNAL MEDICINE

## 2025-07-21 PROCEDURE — 88342 IMHCHEM/IMCYTCHM 1ST ANTB: CPT | Performed by: PATHOLOGY

## 2025-07-21 PROCEDURE — 88341 IMHCHEM/IMCYTCHM EA ADD ANTB: CPT | Performed by: PATHOLOGY

## 2025-07-21 RX ORDER — PROPOFOL 10 MG/ML
INJECTION, EMULSION INTRAVENOUS AS NEEDED
Status: DISCONTINUED | OUTPATIENT
Start: 2025-07-21 | End: 2025-07-21

## 2025-07-21 RX ORDER — CILOSTAZOL 100 MG/1
100 TABLET ORAL 2 TIMES DAILY
Status: DISCONTINUED | OUTPATIENT
Start: 2025-07-21 | End: 2025-07-22 | Stop reason: HOSPADM

## 2025-07-21 RX ORDER — LIDOCAINE HYDROCHLORIDE 20 MG/ML
INJECTION, SOLUTION EPIDURAL; INFILTRATION; INTRACAUDAL; PERINEURAL AS NEEDED
Status: DISCONTINUED | OUTPATIENT
Start: 2025-07-21 | End: 2025-07-21

## 2025-07-21 RX ORDER — SODIUM CHLORIDE, SODIUM LACTATE, POTASSIUM CHLORIDE, CALCIUM CHLORIDE 600; 310; 30; 20 MG/100ML; MG/100ML; MG/100ML; MG/100ML
INJECTION, SOLUTION INTRAVENOUS CONTINUOUS PRN
Status: DISCONTINUED | OUTPATIENT
Start: 2025-07-21 | End: 2025-07-21

## 2025-07-21 RX ORDER — POLYETHYLENE GLYCOL 3350 17 G/17G
17 POWDER, FOR SOLUTION ORAL ONCE
Status: COMPLETED | OUTPATIENT
Start: 2025-07-21 | End: 2025-07-21

## 2025-07-21 RX ADMIN — PROPOFOL 20 MG: 10 INJECTION, EMULSION INTRAVENOUS at 09:33

## 2025-07-21 RX ADMIN — ACETAMINOPHEN 650 MG: 325 TABLET ORAL at 05:31

## 2025-07-21 RX ADMIN — CILOSTAZOL 100 MG: 100 TABLET ORAL at 18:40

## 2025-07-21 RX ADMIN — LIDOCAINE HYDROCHLORIDE 100 MG: 20 INJECTION, SOLUTION EPIDURAL; INFILTRATION; INTRACAUDAL; PERINEURAL at 09:30

## 2025-07-21 RX ADMIN — CILOSTAZOL 100 MG: 100 TABLET ORAL at 12:40

## 2025-07-21 RX ADMIN — PANTOPRAZOLE SODIUM 40 MG: 40 INJECTION, POWDER, FOR SOLUTION INTRAVENOUS at 10:30

## 2025-07-21 RX ADMIN — PANTOPRAZOLE SODIUM 40 MG: 40 INJECTION, POWDER, FOR SOLUTION INTRAVENOUS at 21:21

## 2025-07-21 RX ADMIN — METOPROLOL TARTRATE 25 MG: 25 TABLET, FILM COATED ORAL at 18:40

## 2025-07-21 RX ADMIN — FLUTICASONE PROPIONATE 1 SPRAY: 50 SPRAY, METERED NASAL at 10:29

## 2025-07-21 RX ADMIN — PROPOFOL 20 MG: 10 INJECTION, EMULSION INTRAVENOUS at 09:35

## 2025-07-21 RX ADMIN — SUCRALFATE 1 G: 1 TABLET ORAL at 10:32

## 2025-07-21 RX ADMIN — PROPOFOL 80 MG: 10 INJECTION, EMULSION INTRAVENOUS at 09:30

## 2025-07-21 RX ADMIN — INSULIN LISPRO 16 UNITS: 100 INJECTION, SOLUTION INTRAVENOUS; SUBCUTANEOUS at 18:45

## 2025-07-21 RX ADMIN — ACETAMINOPHEN 650 MG: 325 TABLET ORAL at 21:19

## 2025-07-21 RX ADMIN — SUCRALFATE 1 G: 1 TABLET ORAL at 16:16

## 2025-07-21 RX ADMIN — SUCRALFATE 1 G: 1 TABLET ORAL at 21:19

## 2025-07-21 RX ADMIN — SODIUM CHLORIDE, SODIUM LACTATE, POTASSIUM CHLORIDE, AND CALCIUM CHLORIDE: .6; .31; .03; .02 INJECTION, SOLUTION INTRAVENOUS at 09:25

## 2025-07-21 RX ADMIN — INSULIN LISPRO 2 UNITS: 100 INJECTION, SOLUTION INTRAVENOUS; SUBCUTANEOUS at 12:41

## 2025-07-21 RX ADMIN — ACETAMINOPHEN 650 MG: 325 TABLET ORAL at 13:10

## 2025-07-21 RX ADMIN — INSULIN LISPRO 16 UNITS: 100 INJECTION, SOLUTION INTRAVENOUS; SUBCUTANEOUS at 12:41

## 2025-07-21 RX ADMIN — TOPICAL ANESTHETIC 1 SPRAY: 200 SPRAY DENTAL; PERIODONTAL at 09:25

## 2025-07-21 RX ADMIN — INSULIN LISPRO 1 UNITS: 100 INJECTION, SOLUTION INTRAVENOUS; SUBCUTANEOUS at 18:44

## 2025-07-21 RX ADMIN — METOPROLOL TARTRATE 25 MG: 25 TABLET, FILM COATED ORAL at 10:28

## 2025-07-21 RX ADMIN — IRON SUCROSE 300 MG: 20 INJECTION, SOLUTION INTRAVENOUS at 12:46

## 2025-07-21 RX ADMIN — POLYETHYLENE GLYCOL 3350 17 G: 17 POWDER, FOR SOLUTION ORAL at 16:16

## 2025-07-21 NOTE — ASSESSMENT & PLAN NOTE
CT abdomen negative for intraperitoneal bleed  Follow-up FOBT  Gentle IV fluid hydration with NS at 50 cc/h due to history of   CHF  GI Consult with EGD on 7/21 which showed 10 mm ulcer in the stomach. Will require 8 weeks of oral PPI treatment.  Protonix 40 mg IV every 12 hours while in patient  Carafate

## 2025-07-21 NOTE — PROGRESS NOTES
Progress Note - Hospitalist   Name: Britni Curran 84 y.o. female I MRN: [id number]  Unit/Bed#: 2 E 284-01 I Date of Admission: 7/20/2025   Date of Service: 7/21/2025 I Hospital Day: 0    Assessment & Plan  GI bleed  CT abdomen negative for intraperitoneal bleed  Follow-up FOBT  Gentle IV fluid hydration with NS at 50 cc/h due to history of   CHF  GI Consult with EGD on 7/21 which showed 10 mm ulcer in the stomach. Will require 8 weeks of oral PPI treatment.  Protonix 40 mg IV every 12 hours while in patient  Carafate  Anemia  Hgb is 10 and last few months ago was 14   Follow-up iron panel, LDH, haptoglobin  H&H every 6 hours  Transfuse if hemoglobin drops below 7  Coronary artery disease involving coronary bypass graft of native heart without angina pectoris  Stable  Holding beta-blockers for now while n.p.o. holding statins   Continue monitoring  Type 2 diabetes mellitus without complication, with long-term current use of insulin ([facility])  Lab Results   Component Value Date    HGBA1C 7.4 (H) 07/20/2025       Recent Labs     07/20/25  1105 07/20/25  1634 07/20/25  2336 07/21/25  0534   POCGLU 241* 170* 125 144*       Blood Sugar Average: Last 72 hrs:  (P) 169.2Uses Tresiba 52 units nightly, here on hold while NPO  Insulin sliding scale with fingersticks every 6 hours while NPO  Resume diabetic diet once able to as per GI  Essential hypertension  Restart amlodipine 5 mg.  Continue holding losartan, metoprolol.  Labetalol IV push as needed for elevated blood pressures    Chronic constipation  Now has melena  Uses linaclotide at home  Diastolic CHF (HCC)  Wt Readings from Last 3 Encounters:   07/21/25 103 kg (227 lb 1.2 oz)   04/27/25 105 kg (230 lb 13.2 oz)   04/23/25 103 kg (226 lb)     Most recent echo showed diastolic dysfunction  Does not use Lasix anymore , reports it dehydrates her   Monitor volume status carefully while on fluids  Metoprolol on hold      VTE Pharmacologic Prophylaxis: VTE Score: 3      Mobility:   Basic Mobility Inpatient Raw Score: 21  JH-HLM Goal: 6: Walk 10 steps or more  JH-HLM Achieved: 6: Walk 10 steps or more  JH-HLM Goal achieved. Continue to encourage appropriate mobility.    Patient Centered Rounds: I performed bedside rounds with nursing staff today.     Education and Discussions with Family / Patient: Patient declined call to .     Current Length of Stay: 0 day(s)  Current Patient Status: Observation   Certification Statement: The patient will continue to require additional inpatient hospital stay due to IV PPI treatment and monitoring.  Discharge Plan: Anticipate discharge tomorrow to home.    Code Status: Level 1 - Full Code    Subjective   84 y.o female with history of chronic constipation, T2D, HTN, CAD s/p CABG, diastolic HF, and FILIBERTO presented to the ED due to 2 episodes of dark stool. Patient was found to be anemic with Hgb 10 down from 14 in 02/2025. EGD on 7/21 shows gastric ulcer.    On exam today, patient denies any pain or nausea. She has not had any more episodes of blood in her stool.    Objective :  Temp:  [97.1 °F (36.2 °C)-98.9 °F (37.2 °C)] 98.7 °F (37.1 °C)  HR:  [] 90  BP: (135-163)/(61-72) 163/72  Resp:  [16-23] 23  SpO2:  [94 %-100 %] 96 %  O2 Device: None (Room air)  Nasal Cannula O2 Flow Rate (L/min):  [6 L/min] 6 L/min    Body mass index is 44.35 kg/m².     Input and Output Summary (last 24 hours):     Intake/Output Summary (Last 24 hours) at 7/21/2025 1120  Last data filed at 7/21/2025 0942  Gross per 24 hour   Intake 200 ml   Output --   Net 200 ml       Physical Exam  Vitals and nursing note reviewed.   Constitutional:       General: She is not in acute distress.     Appearance: She is well-developed.   HENT:      Head: Normocephalic and atraumatic.     Cardiovascular:      Rate and Rhythm: Normal rate and regular rhythm.      Heart sounds: No murmur heard.  Pulmonary:      Effort: Pulmonary effort is normal. No respiratory distress.       Breath sounds: Normal breath sounds.   Abdominal:      Palpations: Abdomen is soft.      Tenderness: There is no abdominal tenderness.     Musculoskeletal:         General: No swelling.      Cervical back: Neck supple.     Skin:     General: Skin is warm and dry.     Neurological:      Mental Status: She is alert and oriented to person, place, and time.         Lines/Drains:      Lab Results: I have reviewed the following results:   Results from last 7 days   Lab Units 07/21/25  0514   WBC Thousand/uL 8.04   HEMOGLOBIN g/dL 10.0*   HEMATOCRIT % 31.0*   PLATELETS Thousands/uL 260   SEGS PCT % 51   LYMPHO PCT % 39   MONO PCT % 8   EOS PCT % 1     Results from last 7 days   Lab Units 07/21/25  0514   SODIUM mmol/L 138   POTASSIUM mmol/L 3.7   CHLORIDE mmol/L 103   CO2 mmol/L 28   BUN mg/dL 20   CREATININE mg/dL 0.59*   ANION GAP mmol/L 7   CALCIUM mg/dL 8.9   ALBUMIN g/dL 3.8   TOTAL BILIRUBIN mg/dL 0.30   ALK PHOS U/L 64   ALT U/L 12   AST U/L 14   GLUCOSE RANDOM mg/dL 140     Results from last 7 days   Lab Units 07/20/25  0105   INR  1.00     Results from last 7 days   Lab Units 07/21/25  0534 07/20/25  2336 07/20/25  1634 07/20/25  1105 07/20/25  0549   POC GLUCOSE mg/dl 144* 125 170* 241* 166*     Results from last 7 days   Lab Units 07/20/25  0514   HEMOGLOBIN A1C % 7.4*           Recent Cultures (last 7 days):               Last 24 Hours Medication List:     Current Facility-Administered Medications:     acetaminophen (TYLENOL) tablet 650 mg, Q6H PRN    [Held by provider] amLODIPine (NORVASC) tablet 5 mg, Daily    Artificial Tears Op Soln 1 drop, Q4H PRN    cilostazol (PLETAL) tablet 100 mg, BID    fluticasone (FLONASE) 50 mcg/act nasal spray 1 spray, Daily    [Held by provider] losartan (COZAAR) tablet 100 mg, Daily **AND** [Held by provider] hydroCHLOROthiazide tablet 25 mg, Daily    [Held by provider] hydrOXYzine HCL (ATARAX) tablet 50 mg, HS    insulin lispro (HumALOG/ADMELOG) 100 units/mL subcutaneous  injection 1-6 Units, Q6H ELENA **AND** Fingerstick Glucose (POCT), Q6H    insulin lispro (HumALOG/ADMELOG) 100 units/mL subcutaneous injection 16 Units, TID With Meals    iron sucrose (VENOFER) 300 mg in sodium chloride 0.9 % 250 mL IVPB, Daily    labetalol (NORMODYNE) injection 10 mg, Q6H PRN    meclizine (ANTIVERT) tablet 25 mg, TID PRN    metoprolol tartrate (LOPRESSOR) tablet 25 mg, BID    oxyCODONE (ROXICODONE) split tablet 2.5 mg, Q6H PRN    pantoprazole (PROTONIX) injection 40 mg, Q12H ELENA    simethicone (MYLICON) chewable tablet 80 mg, Q6H PRN    sucralfate (CARAFATE) tablet 1 g, 4x Daily (AC & HS)    Administrative Statements   Today, Patient Was Seen By: Jessica Haddad      **Please Note: This note may have been constructed using a voice recognition system.**

## 2025-07-21 NOTE — ANESTHESIA PREPROCEDURE EVALUATION
Procedure:  EGD    Admitted with 4 gm drop in Hb  Am H/H 10.0/31  Not on blood thinners  Glucose 144 this AM  No nausea/vomiting, does not black tarry stools    FILIBERTO  Post nasal drip  Mild SOB with exertion, SPO2 95-98% on room air    Relevant Problems   CARDIO   (+) Coronary artery disease involving coronary bypass graft of native heart without angina pectoris   (+) Essential hypertension      ENDO   (+) Type 2 diabetes mellitus without complication, with long-term current use of insulin (HCC)      GI/HEPATIC   (+) GI bleed      HEMATOLOGY   (+) Anemia      PULMONARY   (+) Sleep apnea        Physical Exam    Airway     Mallampati score: IV  TM Distance: >3 FB  Neck ROM: full      Cardiovascular      Dental       Pulmonary      Neurological    She appears awake, alert and oriented x3.      Other Findings  post-pubertal.      Anesthesia Plan  ASA Score- 3     Anesthesia Type- IV sedation with anesthesia with ASA Monitors.         Additional Monitors:     Airway Plan:     Comment: Recent labs personally reviewed:  Lab Results       Component                Value               Date                       WBC                      8.04                07/21/2025                 HGB                      10.0 (L)            07/21/2025                 PLT                      260                 07/21/2025            Lab Results       Component                Value               Date                       K                        3.7                 07/21/2025                 BUN                      20                  07/21/2025                 CREATININE               0.59 (L)            07/21/2025            Lab Results       Component                Value               Date                       PTT                      29                  07/20/2025             Lab Results       Component                Value               Date                       INR                      1.00                07/20/2025               Blood type O    Patient was consented for sedation with IV anesthetic. Discussed that we will maintain spontaneous respirations and utilize supplemental O2. I discussed the risks of aspiration, hypoxia, laryngospasm and bronchospasm. I discussed the scenarios related to conversion to general anesthetic. All questions answered.     I, Stacey Rojo MD, have personally seen and evaluated the patient prior to anesthetic care.  I have reviewed the pre-anesthetic record, medical history, allergies, medications and any other medical records if appropriate to the anesthetic care.  If a CRNA is involved in the case, I have reviewed the CRNA assessment, if present, and agree. Patient consented for IV Sedation, general anesthesia as back up. Discussed risks of aspiration, IV infiltration, indications for conversion to general anesthesia. All questions and concerns addressed.   .       Plan Factors-Exercise tolerance (METS): >4 METS.    Chart reviewed. EKG reviewed. Imaging results reviewed. Existing labs reviewed. Patient summary reviewed.    Patient is not a current smoker.  Patient did not smoke on day of surgery.    Obstructive sleep apnea risk education given perioperatively.        Induction- intravenous.    Postoperative Plan- .   Monitoring Plan - Monitoring plan - standard ASA monitoring      Perioperative Resuscitation Plan - Level 1 - Full Code.       Informed Consent- Anesthetic plan and risks discussed with patient.  I personally reviewed this patient with the CRNA. Discussed and agreed on the Anesthesia Plan with the CRNA..      NPO Status:  Vitals Value Taken Time   Date of last liquid 07/20/25 07/21/25 08:38   Time of last liquid 2100 07/21/25 08:38   Date of last solid 07/20/25 07/21/25 08:38   Time of last solid 2000 07/21/25 08:38

## 2025-07-21 NOTE — MALNUTRITION/BMI
This medical record reflects one or more clinical indicators suggestive of morbid obesity.      BMI Findings:  Adult BMI Classifications: Morbid Obesity 40-44.9        Body mass index is 44.35 kg/m².     See Nutrition note dated 7/21/2025 for additional details.  Completed nutrition assessment is viewable in the nutrition documentation.

## 2025-07-21 NOTE — ASSESSMENT & PLAN NOTE
Lab Results   Component Value Date    HGBA1C 7.4 (H) 07/20/2025       Recent Labs     07/20/25  1105 07/20/25  1634 07/20/25  2336 07/21/25  0534   POCGLU 241* 170* 125 144*       Blood Sugar Average: Last 72 hrs:  (P) 169.2Uses Tresiba 52 units nightly, here on hold while NPO  Insulin sliding scale with fingersticks every 6 hours while NPO  Resume diabetic diet once able to as per GI

## 2025-07-21 NOTE — ASSESSMENT & PLAN NOTE
Wt Readings from Last 3 Encounters:   07/21/25 103 kg (227 lb 1.2 oz)   04/27/25 105 kg (230 lb 13.2 oz)   04/23/25 103 kg (226 lb)     Most recent echo showed diastolic dysfunction  Does not use Lasix anymore , reports it dehydrates her   Monitor volume status carefully while on fluids  Metoprolol on hold

## 2025-07-21 NOTE — CASE MANAGEMENT
Case Management Discharge Planning Note    Patient name Britni Curran  Location 2 EAST 284/2 E 284-01 MRN [id number]  : 1940 Date 2025       Current Admission Date: 2025  Current Admission Diagnosis:GI bleed   Patient Active Problem List    Diagnosis Date Noted    GI bleed 2025    Diastolic CHF (HCC) 2025    Anemia 2025    Diverticulosis 2023    Hx of CABG 2023    Sleep apnea 2023    Morbid obesity ([facility]) 2022    Generalized weakness 2022    Post-nasal drip 04/10/2022    Body mass index (BMI) of 40.0 to 44.9 in adult ([facility]) 2021    Coronary artery disease involving coronary bypass graft of native heart without angina pectoris 2021    Type 2 diabetes mellitus without complication, with long-term current use of insulin ([facility]) 2021    Essential hypertension 2021    Chronic constipation 2021      LOS (days): 0  Geometric Mean LOS (GMLOS) (days):   Days to GMLOS:     OBJECTIVE:            Current admission status: Observation   Preferred Pharmacy:   [facility]/pharmacy #[id number] - [place] - [address]  [address]  [place] [id number]  Phone: [phone] Fax: [phone]    Primary Care Provider: Shonna Garnett DO    Primary Insurance: MEDICARE  Secondary Insurance: [employer]    DISCHARGE DETAILS:  As per SLIM rounds, patient experiencing anemia, Ulcer, IR, monitor HGB, GI following. No CM needs anticipated.

## 2025-07-21 NOTE — ANESTHESIA POSTPROCEDURE EVALUATION
Post-Op Assessment Note    CV Status:  Stable  Pain Score: 0    Pain management: adequate       Mental Status:  Sleepy and arousable   Hydration Status:  Euvolemic   PONV Controlled:  None   Airway Patency:  Patent     Post Op Vitals Reviewed: Yes    No anethesia notable event occurred.    Staff: CRNA           Last Filed PACU Vitals:  Vitals Value Taken Time   Temp     Pulse     BP     Resp     SpO2

## 2025-07-21 NOTE — PLAN OF CARE
Problem: Potential for Falls  Goal: Patient will remain free of falls  Description: INTERVENTIONS:  - Educate patient/family on patient safety including physical limitations  - Instruct patient to call for assistance with activity   - Consider consulting OT/PT to assist with strengthening/mobility based on AM PAC & JH-HLM score  - Consult OT/PT to assist with strengthening/mobility   - Keep Call bell within reach  - Keep bed low and locked with side rails adjusted as appropriate  - Keep care items and personal belongings within reach  - Initiate and maintain comfort rounds  - Make Fall Risk Sign visible to staff  - Offer Toileting every 2 Hours, in advance of need  - Initiate/Maintain bed alarm  - Obtain necessary fall risk management equipment  - Apply yellow socks and bracelet for high fall risk patients  - Consider moving patient to room near nurses station  Outcome: Progressing     Problem: PAIN - ADULT  Goal: Verbalizes/displays adequate comfort level or baseline comfort level  Description: Interventions:  - Encourage patient to monitor pain and request assistance  - Assess pain using appropriate pain scale  - Administer analgesics as ordered based on type and severity of pain and evaluate response  - Implement non-pharmacological measures as appropriate and evaluate response  - Consider cultural and social influences on pain and pain management  - Notify physician/advanced practitioner if interventions unsuccessful or patient reports new pain  - Educate patient/family on pain management process including their role and importance of  reporting pain   - Provide non-pharmacologic/complimentary pain relief interventions  Outcome: Progressing     Problem: DISCHARGE PLANNING  Goal: Discharge to home or other facility with appropriate resources  Description: INTERVENTIONS:  - Identify barriers to discharge w/patient and caregiver  - Arrange for needed discharge resources and transportation as appropriate  -  Identify discharge learning needs (meds, wound care, etc.)  - Arrange for interpretive services to assist at discharge as needed  - Refer to Case Management Department for coordinating discharge planning if the patient needs post-hospital services based on physician/advanced practitioner order or complex needs related to functional status, cognitive ability, or social support system  Outcome: Progressing     Problem: Knowledge Deficit  Goal: Patient/family/caregiver demonstrates understanding of disease process, treatment plan, medications, and discharge instructions  Description: Complete learning assessment and assess knowledge base.  Interventions:  - Provide teaching at level of understanding  - Provide teaching via preferred learning methods  Outcome: Progressing     Problem: GASTROINTESTINAL - ADULT  Goal: Maintains or returns to baseline bowel function  Description: INTERVENTIONS:  - Assess bowel function  - Encourage oral fluids to ensure adequate hydration  - Administer IV fluids if ordered to ensure adequate hydration  - Administer ordered medications as needed  - Encourage mobilization and activity  - Consider nutritional services referral to assist patient with adequate nutrition and appropriate food choices  Outcome: Progressing     Problem: HEMATOLOGIC - ADULT  Goal: Maintains hematologic stability  Description: INTERVENTIONS  - Assess for signs and symptoms of bleeding or hemorrhage  - Monitor labs  - Administer supportive blood products/factors as ordered and appropriate  Outcome: Progressing

## 2025-07-21 NOTE — ANESTHESIA POSTPROCEDURE EVALUATION
Post-Op Assessment Note    CV Status:  Stable    Pain management: adequate       Mental Status:  Alert and awake   Hydration Status:  Euvolemic   PONV Controlled:  Controlled   Airway Patency:  Patent  Two or more mitigation strategies used for obstructive sleep apnea   Post Op Vitals Reviewed: Yes    No anethesia notable event occurred.    Staff: Anesthesiologist, CRNA           Last Filed PACU Vitals:  Vitals Value Taken Time   Temp 98.7 °F (37.1 °C) 07/21/25 09:43   Pulse 90 07/21/25 09:53   /72 07/21/25 09:53   Resp 23 07/21/25 09:53   SpO2 96 % 07/21/25 09:53       Modified [place]:     Vitals Value Taken Time   Activity 2 07/21/25 09:53   Respiration 2 07/21/25 09:53   Circulation 2 07/21/25 09:53   Consciousness 2 07/21/25 09:53   Oxygen Saturation 2 07/21/25 09:53     Modified [place] Score: 10

## 2025-07-21 NOTE — ASSESSMENT & PLAN NOTE
Restart amlodipine 5 mg.  Continue holding losartan, metoprolol.  Labetalol IV push as needed for elevated blood pressures

## 2025-07-22 ENCOUNTER — HOSPITAL ENCOUNTER (EMERGENCY)
Facility: HOSPITAL | Age: 85
Discharge: HOME | End: 2025-07-23
Attending: EMERGENCY MEDICINE
Payer: MEDICARE

## 2025-07-22 ENCOUNTER — APPOINTMENT (EMERGENCY)
Dept: RADIOLOGY | Facility: HOSPITAL | Age: 85
End: 2025-07-22
Payer: MEDICARE

## 2025-07-22 ENCOUNTER — APPOINTMENT (EMERGENCY)
Dept: CT IMAGING | Facility: HOSPITAL | Age: 85
End: 2025-07-22
Payer: MEDICARE

## 2025-07-22 VITALS
TEMPERATURE: 97.6 F | DIASTOLIC BLOOD PRESSURE: 60 MMHG | HEIGHT: 60 IN | OXYGEN SATURATION: 94 % | WEIGHT: 227.07 LBS | RESPIRATION RATE: 22 BRPM | SYSTOLIC BLOOD PRESSURE: 145 MMHG | HEART RATE: 87 BPM | BODY MASS INDEX: 44.58 KG/M2

## 2025-07-22 VITALS
HEIGHT: 60 IN | TEMPERATURE: 99.2 F | RESPIRATION RATE: 20 BRPM | WEIGHT: 227 LBS | OXYGEN SATURATION: 97 % | BODY MASS INDEX: 44.57 KG/M2 | HEART RATE: 91 BPM | SYSTOLIC BLOOD PRESSURE: 199 MMHG | DIASTOLIC BLOOD PRESSURE: 81 MMHG

## 2025-07-22 DIAGNOSIS — R33.9 URINARY RETENTION: Primary | ICD-10-CM

## 2025-07-22 DIAGNOSIS — K59.00 CONSTIPATION: ICD-10-CM

## 2025-07-22 LAB
ALBUMIN SERPL BCG-MCNC: 3.7 G/DL (ref 3.5–5)
ALBUMIN SERPL BCG-MCNC: 3.9 G/DL (ref 3.5–5)
ALP SERPL-CCNC: 60 U/L (ref 34–104)
ALP SERPL-CCNC: 71 U/L (ref 34–104)
ALT SERPL W P-5'-P-CCNC: 12 U/L (ref 7–52)
ALT SERPL W P-5'-P-CCNC: 13 U/L (ref 7–52)
ANION GAP SERPL CALCULATED.3IONS-SCNC: 7 MMOL/L (ref 4–13)
ANION GAP SERPL CALCULATED.3IONS-SCNC: 8 MMOL/L (ref 4–13)
AST SERPL W P-5'-P-CCNC: 14 U/L (ref 13–39)
AST SERPL W P-5'-P-CCNC: 14 U/L (ref 13–39)
BASOPHILS # BLD AUTO: 0.03 THOUSANDS/ÂΜL (ref 0–0.1)
BASOPHILS # BLD AUTO: 0.03 THOUSANDS/ÂΜL (ref 0–0.1)
BASOPHILS NFR BLD AUTO: 0 % (ref 0–1)
BASOPHILS NFR BLD AUTO: 0 % (ref 0–1)
BILIRUB DIRECT SERPL-MCNC: 0.06 MG/DL (ref 0–0.2)
BILIRUB SERPL-MCNC: 0.28 MG/DL (ref 0.2–1)
BILIRUB SERPL-MCNC: 0.35 MG/DL (ref 0.2–1)
BILIRUB UR QL STRIP: NEGATIVE
BUN SERPL-MCNC: 12 MG/DL (ref 5–25)
BUN SERPL-MCNC: 14 MG/DL (ref 5–25)
CALCIUM SERPL-MCNC: 8.9 MG/DL (ref 8.4–10.2)
CALCIUM SERPL-MCNC: 9.2 MG/DL (ref 8.4–10.2)
CARDIAC TROPONIN I PNL SERPL HS: 9 NG/L (ref ?–50)
CHLORIDE SERPL-SCNC: 103 MMOL/L (ref 96–108)
CHLORIDE SERPL-SCNC: 104 MMOL/L (ref 96–108)
CLARITY UR: CLEAR
CO2 SERPL-SCNC: 26 MMOL/L (ref 21–32)
CO2 SERPL-SCNC: 27 MMOL/L (ref 21–32)
COLOR UR: COLORLESS
CREAT SERPL-MCNC: 0.55 MG/DL (ref 0.6–1.3)
CREAT SERPL-MCNC: 0.56 MG/DL (ref 0.6–1.3)
EOSINOPHIL # BLD AUTO: 0.07 THOUSAND/ÂΜL (ref 0–0.61)
EOSINOPHIL # BLD AUTO: 0.1 THOUSAND/ÂΜL (ref 0–0.61)
EOSINOPHIL NFR BLD AUTO: 1 % (ref 0–6)
EOSINOPHIL NFR BLD AUTO: 1 % (ref 0–6)
ERYTHROCYTE [DISTWIDTH] IN BLOOD BY AUTOMATED COUNT: 13.9 % (ref 11.6–15.1)
ERYTHROCYTE [DISTWIDTH] IN BLOOD BY AUTOMATED COUNT: 14.1 % (ref 11.6–15.1)
GFR SERPL CREATININE-BSD FRML MDRD: 87 ML/MIN/1.73SQ M
GFR SERPL CREATININE-BSD FRML MDRD: 87 ML/MIN/1.73SQ M
GLUCOSE SERPL-MCNC: 128 MG/DL (ref 65–140)
GLUCOSE SERPL-MCNC: 130 MG/DL (ref 65–140)
GLUCOSE SERPL-MCNC: 131 MG/DL (ref 65–140)
GLUCOSE SERPL-MCNC: 137 MG/DL (ref 65–140)
GLUCOSE SERPL-MCNC: 173 MG/DL (ref 65–140)
GLUCOSE SERPL-MCNC: 174 MG/DL (ref 65–140)
GLUCOSE UR STRIP-MCNC: NEGATIVE MG/DL
HAPTOGLOB SERPL-MCNC: 184 MG/DL (ref 41–333)
HCT VFR BLD AUTO: 28.3 % (ref 34.8–46.1)
HCT VFR BLD AUTO: 29.8 % (ref 34.8–46.1)
HGB BLD-MCNC: 9.3 G/DL (ref 11.5–15.4)
HGB BLD-MCNC: 9.8 G/DL (ref 11.5–15.4)
HGB UR QL STRIP.AUTO: NEGATIVE
IMM GRANULOCYTES # BLD AUTO: 0.05 THOUSAND/UL (ref 0–0.2)
IMM GRANULOCYTES # BLD AUTO: 0.08 THOUSAND/UL (ref 0–0.2)
IMM GRANULOCYTES NFR BLD AUTO: 1 % (ref 0–2)
IMM GRANULOCYTES NFR BLD AUTO: 1 % (ref 0–2)
KETONES UR STRIP-MCNC: NEGATIVE MG/DL
LACTATE SERPL-SCNC: 1.1 MMOL/L (ref 0.5–2)
LEUKOCYTE ESTERASE UR QL STRIP: NEGATIVE
LYMPHOCYTES # BLD AUTO: 2.14 THOUSANDS/ÂΜL (ref 0.6–4.47)
LYMPHOCYTES # BLD AUTO: 2.79 THOUSANDS/ÂΜL (ref 0.6–4.47)
LYMPHOCYTES NFR BLD AUTO: 30 % (ref 14–44)
LYMPHOCYTES NFR BLD AUTO: 34 % (ref 14–44)
MCH RBC QN AUTO: 29.4 PG (ref 26.8–34.3)
MCH RBC QN AUTO: 29.8 PG (ref 26.8–34.3)
MCHC RBC AUTO-ENTMCNC: 32.9 G/DL (ref 31.4–37.4)
MCHC RBC AUTO-ENTMCNC: 32.9 G/DL (ref 31.4–37.4)
MCV RBC AUTO: 90 FL (ref 82–98)
MCV RBC AUTO: 91 FL (ref 82–98)
MONOCYTES # BLD AUTO: 0.66 THOUSAND/ÂΜL (ref 0.17–1.22)
MONOCYTES # BLD AUTO: 0.68 THOUSAND/ÂΜL (ref 0.17–1.22)
MONOCYTES NFR BLD AUTO: 8 % (ref 4–12)
MONOCYTES NFR BLD AUTO: 9 % (ref 4–12)
NEUTROPHILS # BLD AUTO: 4.23 THOUSANDS/ÂΜL (ref 1.85–7.62)
NEUTROPHILS # BLD AUTO: 4.52 THOUSANDS/ÂΜL (ref 1.85–7.62)
NEUTS SEG NFR BLD AUTO: 56 % (ref 43–75)
NEUTS SEG NFR BLD AUTO: 59 % (ref 43–75)
NITRITE UR QL STRIP: NEGATIVE
NRBC BLD AUTO-RTO: 0 /100 WBCS
NRBC BLD AUTO-RTO: 0 /100 WBCS
PH UR STRIP.AUTO: 7.5 [PH]
PLATELET # BLD AUTO: 245 THOUSANDS/UL (ref 149–390)
PLATELET # BLD AUTO: 284 THOUSANDS/UL (ref 149–390)
PMV BLD AUTO: 10.3 FL (ref 8.9–12.7)
PMV BLD AUTO: 10.7 FL (ref 8.9–12.7)
POTASSIUM SERPL-SCNC: 3.7 MMOL/L (ref 3.5–5.3)
POTASSIUM SERPL-SCNC: 3.7 MMOL/L (ref 3.5–5.3)
PROT SERPL-MCNC: 5.7 G/DL (ref 6.4–8.4)
PROT SERPL-MCNC: 6 G/DL (ref 6.4–8.4)
PROT UR STRIP-MCNC: NEGATIVE MG/DL
RBC # BLD AUTO: 3.16 MILLION/UL (ref 3.81–5.12)
RBC # BLD AUTO: 3.29 MILLION/UL (ref 3.81–5.12)
SODIUM SERPL-SCNC: 137 MMOL/L (ref 135–147)
SODIUM SERPL-SCNC: 138 MMOL/L (ref 135–147)
SP GR UR STRIP.AUTO: 1.02 (ref 1–1.03)
UROBILINOGEN UR STRIP-ACNC: <2 MG/DL
WBC # BLD AUTO: 7.18 THOUSAND/UL (ref 4.31–10.16)
WBC # BLD AUTO: 8.2 THOUSAND/UL (ref 4.31–10.16)

## 2025-07-22 PROCEDURE — 82948 REAGENT STRIP/BLOOD GLUCOSE: CPT

## 2025-07-22 PROCEDURE — 71045 X-RAY EXAM CHEST 1 VIEW: CPT

## 2025-07-22 PROCEDURE — 80076 HEPATIC FUNCTION PANEL: CPT | Performed by: EMERGENCY MEDICINE

## 2025-07-22 PROCEDURE — 85025 COMPLETE CBC W/AUTO DIFF WBC: CPT | Performed by: EMERGENCY MEDICINE

## 2025-07-22 PROCEDURE — 80053 COMPREHEN METABOLIC PANEL: CPT | Performed by: INTERNAL MEDICINE

## 2025-07-22 PROCEDURE — 83605 ASSAY OF LACTIC ACID: CPT | Performed by: EMERGENCY MEDICINE

## 2025-07-22 PROCEDURE — 99239 HOSP IP/OBS DSCHRG MGMT >30: CPT | Performed by: STUDENT IN AN ORGANIZED HEALTH CARE EDUCATION/TRAINING PROGRAM

## 2025-07-22 PROCEDURE — 99285 EMERGENCY DEPT VISIT HI MDM: CPT | Performed by: EMERGENCY MEDICINE

## 2025-07-22 PROCEDURE — 99284 EMERGENCY DEPT VISIT MOD MDM: CPT

## 2025-07-22 PROCEDURE — 74177 CT ABD & PELVIS W/CONTRAST: CPT

## 2025-07-22 PROCEDURE — 85025 COMPLETE CBC W/AUTO DIFF WBC: CPT | Performed by: INTERNAL MEDICINE

## 2025-07-22 PROCEDURE — 84484 ASSAY OF TROPONIN QUANT: CPT | Performed by: EMERGENCY MEDICINE

## 2025-07-22 PROCEDURE — 96361 HYDRATE IV INFUSION ADD-ON: CPT

## 2025-07-22 PROCEDURE — 80048 BASIC METABOLIC PNL TOTAL CA: CPT | Performed by: EMERGENCY MEDICINE

## 2025-07-22 PROCEDURE — 93005 ELECTROCARDIOGRAM TRACING: CPT

## 2025-07-22 PROCEDURE — 36415 COLL VENOUS BLD VENIPUNCTURE: CPT | Performed by: EMERGENCY MEDICINE

## 2025-07-22 PROCEDURE — 96360 HYDRATION IV INFUSION INIT: CPT

## 2025-07-22 PROCEDURE — 51798 US URINE CAPACITY MEASURE: CPT

## 2025-07-22 PROCEDURE — 81003 URINALYSIS AUTO W/O SCOPE: CPT | Performed by: EMERGENCY MEDICINE

## 2025-07-22 RX ORDER — PANTOPRAZOLE SODIUM 40 MG/1
40 TABLET, DELAYED RELEASE ORAL
Qty: 60 TABLET | Refills: 0 | Status: SHIPPED | OUTPATIENT
Start: 2025-07-22 | End: 2025-09-20

## 2025-07-22 RX ORDER — POLYETHYLENE GLYCOL 3350 17 G/17G
17 POWDER, FOR SOLUTION ORAL DAILY
Status: DISCONTINUED | OUTPATIENT
Start: 2025-07-22 | End: 2025-07-22 | Stop reason: HOSPADM

## 2025-07-22 RX ORDER — CYANOCOBALAMIN 1000 UG/ML
1000 INJECTION, SOLUTION INTRAMUSCULAR; SUBCUTANEOUS ONCE
Status: DISCONTINUED | OUTPATIENT
Start: 2025-07-22 | End: 2025-07-22 | Stop reason: HOSPADM

## 2025-07-22 RX ORDER — PANTOPRAZOLE SODIUM 40 MG/1
40 TABLET, DELAYED RELEASE ORAL
Status: DISCONTINUED | OUTPATIENT
Start: 2025-07-22 | End: 2025-07-22 | Stop reason: HOSPADM

## 2025-07-22 RX ORDER — SENNOSIDES 8.6 MG
8.6 TABLET ORAL
Qty: 30 TABLET | Refills: 0 | Status: SHIPPED | OUTPATIENT
Start: 2025-07-22

## 2025-07-22 RX ADMIN — SUCRALFATE 1 G: 1 TABLET ORAL at 11:41

## 2025-07-22 RX ADMIN — IOHEXOL 100 ML: 350 INJECTION, SOLUTION INTRAVENOUS at 20:37

## 2025-07-22 RX ADMIN — FLUTICASONE PROPIONATE 1 SPRAY: 50 SPRAY, METERED NASAL at 08:17

## 2025-07-22 RX ADMIN — POLYETHYLENE GLYCOL 3350 17 G: 17 POWDER, FOR SOLUTION ORAL at 12:25

## 2025-07-22 RX ADMIN — AMLODIPINE BESYLATE 5 MG: 5 TABLET ORAL at 08:17

## 2025-07-22 RX ADMIN — INSULIN LISPRO 16 UNITS: 100 INJECTION, SOLUTION INTRAVENOUS; SUBCUTANEOUS at 08:14

## 2025-07-22 RX ADMIN — SUCRALFATE 1 G: 1 TABLET ORAL at 07:28

## 2025-07-22 RX ADMIN — IRON SUCROSE 300 MG: 20 INJECTION, SOLUTION INTRAVENOUS at 09:38

## 2025-07-22 RX ADMIN — SODIUM CHLORIDE 1000 ML: 0.9 INJECTION, SOLUTION INTRAVENOUS at 21:09

## 2025-07-22 RX ADMIN — ACETAMINOPHEN 650 MG: 325 TABLET ORAL at 03:33

## 2025-07-22 RX ADMIN — PANTOPRAZOLE SODIUM 40 MG: 40 INJECTION, POWDER, FOR SOLUTION INTRAVENOUS at 08:17

## 2025-07-22 RX ADMIN — INSULIN LISPRO 1 UNITS: 100 INJECTION, SOLUTION INTRAVENOUS; SUBCUTANEOUS at 08:15

## 2025-07-22 RX ADMIN — ACETAMINOPHEN 650 MG: 325 TABLET ORAL at 09:38

## 2025-07-22 RX ADMIN — METOPROLOL TARTRATE 25 MG: 25 TABLET, FILM COATED ORAL at 08:17

## 2025-07-22 RX ADMIN — INSULIN LISPRO 16 UNITS: 100 INJECTION, SOLUTION INTRAVENOUS; SUBCUTANEOUS at 12:26

## 2025-07-22 RX ADMIN — CILOSTAZOL 100 MG: 100 TABLET ORAL at 08:17

## 2025-07-22 NOTE — DISCHARGE INSTR - AVS FIRST PAGE
Rudy andrade,    Gi performed EGD while you were hospitalized  IMPRESSION:  The esophagus and duodenum appeared normal.  Single ulcer in the stomach with clean base (Selvin III)  Performed forceps biopsies in the upper third of the esophagus and lower third of the esophagus  Performed forceps biopsies in the greater curve of the stomach, lesser curve of the stomach, incisura, antrum, 1st part of the duodenum and 2nd part of the duodenum to rule out celiac disease and H. pylori  Dilated in the esophagus with Savary-Arias dilator to 54 Fr ending size        RECOMMENDATION:  Await pathology results   PPI 40 mg twice daily for 8 weeks.  Repeat EGD in 8 weeks.     You are being discharge with protonix 40 mg twice daily to help the ulcer heal  Please follow up with Gi in outpatient setting

## 2025-07-22 NOTE — ASSESSMENT & PLAN NOTE
Wt Readings from Last 3 Encounters:   07/21/25 103 kg (227 lb 1.2 oz)   04/27/25 105 kg (230 lb 13.2 oz)   04/23/25 103 kg (226 lb)     Most recent echo showed diastolic dysfunction  Does not use Lasix anymore , reports it dehydrates her   Appears euvolemic

## 2025-07-22 NOTE — DISCHARGE SUMMARY
Discharge Summary - Hospitalist   Name: Britni Curran 84 y.o. female I MRN: [id number]  Unit/Bed#: 2 E 284-01 I Date of Admission: 7/20/2025   Date of Service: 7/22/2025 I Hospital Day: 1     Assessment & Plan  GI bleed  CT abdomen negative for intraperitoneal bleed  Started on protonix 40 mg IV bID  GI Consult with EGD on 7/21 which showed 10 mm ulcer in the stomach. Will require 8 weeks of oral PPI treatment.  Carafate  Hg remains stable at 10    Anemia  Hgb is 10 and last few months ago was 14    Latest Reference Range & Units 07/20/25 05:14 07/21/25 05:14   Iron 50 - 212 ug/dL 42 (L)    FERRITIN 30 - 307 ng/mL 30    Iron Saturation 15 - 50 % 10 (L)    TRANSFERRIN 203 - 362 mg/dL 292    TIBC 250 - 450 ug/dL 408.8    UIBC 155 - 355 ug/dL 367 (H)    FOLATE >5.9 ng/mL  >22.3      Latest Reference Range & Units 07/21/25 05:14   Vitamin B-12 180 - 914 pg/mL 311     Hg remains stable at 10  S/p Iv venofer 300 mg x 2 doses and one time cyanocobalamin injection while hospitalized for anemia    Coronary artery disease involving coronary bypass graft of native heart without angina pectoris  Stable  Holding beta-blockers for now while n.p.o. holding statins   Continue monitoring  Type 2 diabetes mellitus without complication, with long-term current use of insulin (HCC)  Lab Results   Component Value Date    HGBA1C 7.4 (H) 07/20/2025       Recent Labs     07/21/25  1844 07/22/25  0006 07/22/25  0640 07/22/25  1109   POCGLU 177* 128 174* 137       Blood Sugar Average: Last 72 hrs:  (P) [id number] Tresiba 52 units nightly, here on hold while NPO  Insulin sliding scale with fingersticks every 6 hours while NPO  Resume home diabetic regiment on d/c   Essential hypertension  Continue home antihypertensive on d/c     Chronic constipation  Now has melena  Uses linaclotide at home  Diastolic CHF (HCC)  Wt Readings from Last 3 Encounters:   07/21/25 103 kg (227 lb 1.2 oz)   04/27/25 105 kg (230 lb 13.2 oz)    04/23/25 103 kg (226 lb)     Most recent echo showed diastolic dysfunction  Does not use Lasix anymore , reports it dehydrates her   Appears euvolemic       Medical Problems       Resolved Problems  Date Reviewed: 7/20/2025   None       Discharging Physician / Practitioner: August Jalloh DO  PCP: Shonna Garnett DO  Admission Date:   Admission Orders (From admission, onward)       Ordered        07/21/25 1335  INPATIENT ADMISSION  Once            07/20/25 0310  Place in Observation  Once                          Discharge Date: 07/22/25    Next Steps for Physician/AP Assuming Care:  Monitor anemia    Test Results Pending at Discharge (will require follow up):  Biopsy results    Medication Changes for Discharge & Rationale:   Protonix 40 mg bid  See after visit summary for reconciled discharge medications provided to patient and/or family.     Consultations During Hospital Stay:  Gi    Procedures Performed:   EGD    Significant Findings / Test Results:   EGD IMPRESSION:  The esophagus and duodenum appeared normal.  Single ulcer in the stomach with clean base (Selvin III)  Performed forceps biopsies in the upper third of the esophagus and lower third of the esophagus  Performed forceps biopsies in the greater curve of the stomach, lesser curve of the stomach, incisura, antrum, 1st part of the duodenum and 2nd part of the duodenum to rule out celiac disease and H. pylori  Dilated in the esophagus with Savary-Arias dilator to 54 Fr ending size        RECOMMENDATION:  Await pathology results   PPI 40 mg twice daily for 8 weeks.  Repeat EGD in 8 weeks.       Incidental Findings:   See above       Hospital Course:   Britni Curran is a 84 y.o. female patient who originally presented to the hospital on 7/20/2025 due to episodes or dark stools with a drop of hemoglobin from baseline of 14 to 10.  CT abdomen pelvis with contrast showed Liquid consistency stool throughout the colon, correlate with diarrheal  illness, no signs of acute bleed. Patient started on protonix 40 mg IV BID. Gi consulted and patient underwent EGD showing 10 mm ulcer in the stomach.  Patient has been cleared by GI for discharge on Protonix 40 mg twice daily with outpatient repeat EGD in 8 weeks.  While hospitalized anemia workup shows iron deficiency anemia and low B12 levels.  Patient was given IV Venofer 300 mg x 2 and one-time cyanocobalamin injection.  Hg has remained stable in the 9-10 range while hospitalized.        Please see above list of diagnoses and related plan for additional information.     Discharge Day Visit / Exam:   Subjective:  Pt states she feels constipated, denies any further episodes of bleeding. Given one time miralax  Vitals: Blood Pressure: 145/60 (07/22/25 0700)  Pulse: 87 (07/21/25 2202)  Temperature: 97.6 °F (36.4 °C) (07/22/25 0700)  Temp Source: Oral (07/22/25 0700)  Respirations: 22 (07/22/25 0700)  Height: 5' (152.4 cm) (07/21/25 0832)  Weight - Scale: 103 kg (227 lb 1.2 oz) (07/21/25 0832)  SpO2: 94 % (07/22/25 0100)  Physical Exam  Vitals and nursing note reviewed.   Constitutional:       General: She is not in acute distress.     Appearance: She is well-developed. She is obese.   HENT:      Head: Normocephalic and atraumatic.     Eyes:      Conjunctiva/sclera: Conjunctivae normal.       Cardiovascular:      Rate and Rhythm: Normal rate and regular rhythm.      Heart sounds: No murmur heard.  Pulmonary:      Effort: Pulmonary effort is normal. No respiratory distress.      Breath sounds: Normal breath sounds.   Abdominal:      Palpations: Abdomen is soft.      Tenderness: There is no abdominal tenderness.     Musculoskeletal:         General: No swelling.     Skin:     General: Skin is warm and dry.     Neurological:      Mental Status: She is alert. Mental status is at baseline.     Psychiatric:         Mood and Affect: Mood normal.          Discussion with Family: Attempted to update  (daughter)  via phone. Left voicemail.     Discharge instructions/Information to patient and family:   See after visit summary for information provided to patient and family.      Provisions for Follow-Up Care:  See after visit summary for information related to follow-up care and any pertinent home health orders.      Mobility at time of Discharge:   Basic Mobility Inpatient Raw Score: 18  JH-HLM Goal: 6: Walk 10 steps or more  JH-HLM Achieved: 6: Walk 10 steps or more  HLM Goal achieved. Continue to encourage appropriate mobility.     Disposition:   Home    Planned Readmission: no    Administrative Statements   Discharge Statement:  I have spent a total time of 35 minutes in caring for this patient on the day of the visit/encounter. >30 minutes of time was spent on: Diagnostic results, Prognosis, Risks and benefits of tx options, Instructions for management, and Patient and family education.    **Please Note: This note may have been constructed using a voice recognition system**

## 2025-07-22 NOTE — ASSESSMENT & PLAN NOTE
Hgb is 10 and last few months ago was 14    Latest Reference Range & Units 07/20/25 05:14 07/21/25 05:14   Iron 50 - 212 ug/dL 42 (L)    FERRITIN 30 - 307 ng/mL 30    Iron Saturation 15 - 50 % 10 (L)    TRANSFERRIN 203 - 362 mg/dL 292    TIBC 250 - 450 ug/dL 408.8    UIBC 155 - 355 ug/dL 367 (H)    FOLATE >5.9 ng/mL  >22.3      Latest Reference Range & Units 07/21/25 05:14   Vitamin B-12 180 - 914 pg/mL 311     Hg remains stable at 10  S/p Iv venofer 300 mg x 2 doses and one time cyanocobalamin injection while hospitalized for anemia

## 2025-07-22 NOTE — ASSESSMENT & PLAN NOTE
Lab Results   Component Value Date    HGBA1C 7.4 (H) 07/20/2025       Recent Labs     07/21/25  1844 07/22/25  0006 07/22/25  0640 07/22/25  1109   POCGLU 177* 128 174* 137       Blood Sugar Average: Last 72 hrs:  (P) 168.1836865360117803Rifo Tresiba 52 units nightly, here on hold while NPO  Insulin sliding scale with fingersticks every 6 hours while NPO  Resume home diabetic regiment on d/c

## 2025-07-22 NOTE — ASSESSMENT & PLAN NOTE
CT abdomen negative for intraperitoneal bleed  Started on protonix 40 mg IV bID  GI Consult with EGD on 7/21 which showed 10 mm ulcer in the stomach. Will require 8 weeks of oral PPI treatment.  Carafate  Hg remains stable at 10

## 2025-07-22 NOTE — CASE MANAGEMENT
Case Management Discharge Planning Note    Patient name Britni Curran  Location 2 EAST 284/2 E 284-01 MRN [id number]  : 1940 Date 2025       Current Admission Date: 2025  Current Admission Diagnosis:GI bleed   Patient Active Problem List    Diagnosis Date Noted    GI bleed 2025    Diastolic CHF (HCC) 2025    Anemia 2025    Diverticulosis 2023    Hx of CABG 2023    Sleep apnea 2023    Morbid obesity ([facility]) 2022    Generalized weakness 2022    Post-nasal drip 04/10/2022    Body mass index (BMI) of 40.0 to 44.9 in adult ([facility]) 2021    Coronary artery disease involving coronary bypass graft of native heart without angina pectoris 2021    Type 2 diabetes mellitus without complication, with long-term current use of insulin ([facility]) 2021    Essential hypertension 2021    Chronic constipation 2021      LOS (days): 1  Geometric Mean LOS (GMLOS) (days): 2.9  Days to GMLOS:1.9     OBJECTIVE:  Risk of Unplanned Readmission Score: 15.8         Current admission status: Inpatient   Preferred Pharmacy:   [facility]/pharmacy #[id number] - [place] - [address]  [address]  [place] [id number]  Phone: [phone] Fax: [phone]    Primary Care Provider: Shonna Garnett DO    Primary Insurance: MEDICARE  Secondary Insurance: AETNA    DISCHARGE DETAILS:     CM went to bedside to discuss discharge planning with patient. Patient declined HHC and reports participating with rehabilitation through [facility]'s virtual program and would like to continue with that. Patient reports using a cane at home and has no additional needs. Patient scheduled to be discharged today vs. 24 hrs.  CM to continue to follow.

## 2025-07-23 ENCOUNTER — TELEMEDICINE (OUTPATIENT)
Age: 85
End: 2025-07-23
Payer: MEDICARE

## 2025-07-23 ENCOUNTER — TRANSITIONAL CARE MANAGEMENT (OUTPATIENT)
Age: 85
End: 2025-07-23

## 2025-07-23 ENCOUNTER — PATIENT OUTREACH (OUTPATIENT)
Dept: CASE MANAGEMENT | Facility: HOSPITAL | Age: 85
End: 2025-07-23

## 2025-07-23 ENCOUNTER — TELEPHONE (OUTPATIENT)
Age: 85
End: 2025-07-23

## 2025-07-23 ENCOUNTER — NURSE TRIAGE (OUTPATIENT)
Age: 85
End: 2025-07-23

## 2025-07-23 ENCOUNTER — PATIENT OUTREACH (OUTPATIENT)
Dept: CASE MANAGEMENT | Facility: OTHER | Age: 85
End: 2025-07-23

## 2025-07-23 DIAGNOSIS — R33.9 URINARY RETENTION: ICD-10-CM

## 2025-07-23 DIAGNOSIS — D50.0 IRON DEFICIENCY ANEMIA DUE TO CHRONIC BLOOD LOSS: ICD-10-CM

## 2025-07-23 DIAGNOSIS — E11.9 TYPE 2 DIABETES MELLITUS WITHOUT COMPLICATION, WITH LONG-TERM CURRENT USE OF INSULIN (HCC): ICD-10-CM

## 2025-07-23 DIAGNOSIS — K25.4 CHRONIC GASTRIC ULCER WITH HEMORRHAGE: Primary | ICD-10-CM

## 2025-07-23 DIAGNOSIS — Z79.4 TYPE 2 DIABETES MELLITUS WITHOUT COMPLICATION, WITH LONG-TERM CURRENT USE OF INSULIN (HCC): ICD-10-CM

## 2025-07-23 LAB
ATRIAL RATE: 87 BPM
P AXIS: 35 DEGREES
PR INTERVAL: 152 MS
QRS AXIS: 26 DEGREES
QRSD INTERVAL: 122 MS
QT INTERVAL: 398 MS
QTC INTERVAL: 478 MS
T WAVE AXIS: 13 DEGREES
VENTRICULAR RATE: 87 BPM

## 2025-07-23 PROCEDURE — 99495 TRANSJ CARE MGMT MOD F2F 14D: CPT | Performed by: FAMILY MEDICINE

## 2025-07-23 PROCEDURE — 93010 ELECTROCARDIOGRAM REPORT: CPT | Performed by: INTERNAL MEDICINE

## 2025-07-23 NOTE — TELEPHONE ENCOUNTER
Patient is NP calling in to follow up with us. She was recently admitted and had Cisneros placed and was advised to follow up with urology. Patient wanting to acquire some type of home care as she is stating she feels weak and debilitated and inquiring about help with Cisneros. Advised she will need to follow up with PCP in meant time and advised we will call her back r/t getting her scheduled with us appropriately. Should this patient have a TOV w/ AP visit and when?

## 2025-07-23 NOTE — TELEPHONE ENCOUNTER
PCP placed a referral for VNA services    First available in Wing with AP in the morning is  9/29/25     Please advise as patient will need to be seen sooner, she will also need assistance with transportation

## 2025-07-23 NOTE — ED NOTES
Extensive F/C care provided with risk vs benefits expressed, return demo via daughter without issue.     Tom Roca RN  07/23/25 0100

## 2025-07-23 NOTE — TELEPHONE ENCOUNTER
"REASON FOR CONVERSATION: Urinary Retention    SYMPTOMS: Urine is not flowing through urinary catheter for 2 hours; weakness    OTHER HEALTH INFORMATION: PCP was seen at Emergency Department last night for urinary retention, catheter placed, and patient discharged to home    PROTOCOL DISPOSITION: Go to Office Now/Urgent Care    CARE ADVICE PROVIDED: Patient requests assistance of home health nurse today, as soon as possible. She declines to return to Emergency Department/UC/PCP office for evaluation.    PRACTICE FOLLOW-UP: Please provide patient with recommendation.      Reason for Disposition   Catheter is broken and is not working (does not function normally)    Answer Assessment - Initial Assessment Questions  1. SYMPTOMS: \"What symptoms are you concerned about?\"      Urine is not flowing through catheter    2. ONSET:  \"When did the symptoms start?\"      2 hours ago    3. FEVER: \"Is there a fever?\" If Yes, ask: \"What is the temperature, how was it measured, and when did it start?\"      Denies    4. ABDOMEN PAIN: \"Is there any abdomen pain?\" (Scale 1-10; or mild, moderate, severe)      Denies    5. URINE COLOR: \"What color is the urine?\"  \"Is there blood present in the urine?\" (e.g., clear, yellow, cloudy, tea-colored, blood streaks, bright red)      N/A    6. URINE AMOUNT: \"When did you last empty the urine from the collection bag?\" \"How much urine was in the bag at that time?\" How much urine is in the collection bag now?\"      2 hours ago    7. INSERTION: \"How long have you (they) had the catheter?\"      Since last night    8. OTHER SYMPTOMS: \"Are there any other symptoms?\" (e.g., abdomen swelling, back pain, bladder spasms, constipation, foul smelling urine, leaking of urine)       Weakness    Protocols used: Urinary Catheter (e.g., Cisneros) Symptoms and Questions-Adult-OH    "

## 2025-07-23 NOTE — ED NOTES
Pt observed, Incontinent of large amount of urine- per ER Tech; Pt reported that she do not know when she has to pee. Bladder scan performed and resulted with 429 mL of fluid in bladder. During procedure, Pt reports that she can not walk or can not walk too far, only a few steps. Assigned ER Provider was made updated for same.     Tom Roca RN  07/22/25 0432

## 2025-07-23 NOTE — TELEPHONE ENCOUNTER
Patient states she ended up back in the ED yesterday and is now home with a catheter. She is interested in home help, requests appointment be moved up if possible so referral can be paced as soon as possible.

## 2025-07-23 NOTE — DISCHARGE INSTRUCTIONS
You will need to call to make an appointment with [facility]'s urology for a follow-up appointment.  At that follow-up appointment you can have your Cisneros catheter removed for a voiding trial to ensure that you are no longer retaining urine.

## 2025-07-23 NOTE — PROGRESS NOTES
HRR referral received via report. Pt admitted to [facility] - for GI Bleed then seen in [facility] on - for Urinary retention, constipation.  Chart review performed.  10mm stomach ulcer seen on EGD-8 weeks PPI, Carafate then repeat EGD    PMH: DM2-7.4, hx CABG, Lo CHF, CAD    Outreach call to pt, confirmed , introduced self and described CM services.    Pt answered the phone and very quickly stated she was not feeling good, very upset about her care, crying for a minute, she did not want to be dc'd, she has a catheter in and does not understand why, wanted medication for constipation and did not receive any, had many questions about many different complaints. Supportive listening provided for most of call.    Pt lives with her daughter whom is working from home and training for a new job. Pt feels badly interrupting her for anything.     Pt has little appetite but knows she needs to eat something with her DM. We discussed frequent small meals and staying hydrated. Pt states there is mostly clear urine coming out of tubing, we reviewed trying her best to keep cronin bag below her waist and monitoring amount of output.     Pt has other complaints of her legs hurting her and a tendon causing pain. Pt called PCP and was able to get a virtual visit this afternoon. I encouraged pt to try to write down on a list all of her concerns before the apt so she does not forget anything. Pt agreed and then needed to end call so she could do this.     Pt was open to a follow up RNCM call next week, name and contact info provided for Sandi MCGOWAN Apts:  PCP  Virtual this afternoon  Urology-pt wants to speak to her PCP first  GI-

## 2025-07-23 NOTE — TELEPHONE ENCOUNTER
Spoke with: patient  Re:  TCM call Dx GI bleed           ER yesterday for chills    Provider's message relayed in full detail.  Comments:    Pt stated she has cronin and that there has not been any urine output since 12:30 this afternoon.  Pt stated she had liquids this afternoon and lunch, which caused nausea, and as of 2:45 this afternoon there is no urine output.    Virtual TCM appt scheduled for  this afternoon at 3:20  Please advise

## 2025-07-24 ENCOUNTER — TELEPHONE (OUTPATIENT)
Age: 85
End: 2025-07-24

## 2025-07-24 NOTE — TELEPHONE ENCOUNTER
Cisneros should not be in longer than 1 month. Should be sooner. Please look at other office locations if patient willing to travel

## 2025-07-24 NOTE — TELEPHONE ENCOUNTER
Niya from [facility] states they received a referral for home care, however, patient is currently with [facility] for PT. Per Niya, unfortunately patient can not have both. Niya is asking on what they should do? Please contact Niya at [phone]

## 2025-07-24 NOTE — TELEPHONE ENCOUNTER
Patient was calling in about transportation for a possible appt.     Advised the appt has to be scheduled first and the office is keeping an eye on the cancellations to try to get the patient into the office.

## 2025-07-24 NOTE — TELEPHONE ENCOUNTER
Lilly with [facility] states she found out [facility] has been placed on hold. They can schedule with patient but need her signed office note from yesterday. She also states if provider wants they can add PT to the skilled nursing.

## 2025-07-25 ENCOUNTER — OFFICE VISIT (OUTPATIENT)
Dept: UROLOGY | Facility: CLINIC | Age: 85
End: 2025-07-25
Payer: MEDICARE

## 2025-07-25 VITALS
RESPIRATION RATE: 18 BRPM | BODY MASS INDEX: 44.76 KG/M2 | HEART RATE: 82 BPM | WEIGHT: 228 LBS | OXYGEN SATURATION: 94 % | SYSTOLIC BLOOD PRESSURE: 118 MMHG | HEIGHT: 60 IN | TEMPERATURE: 98.2 F | DIASTOLIC BLOOD PRESSURE: 68 MMHG

## 2025-07-25 DIAGNOSIS — R33.9 URINARY RETENTION: Primary | ICD-10-CM

## 2025-07-25 PROCEDURE — 99204 OFFICE O/P NEW MOD 45 MIN: CPT | Performed by: PHYSICIAN ASSISTANT

## 2025-07-25 NOTE — TELEPHONE ENCOUNTER
Patient called back to confirm appointment time and location as her niece will be bringing her.

## 2025-07-25 NOTE — TELEPHONE ENCOUNTER
Scheduled today for NP appt /cancellation  Scheduled for TOV on 8/5/25  Patient trying to find a ride for today's appt . Hopefully she can come today because we do not have anything else . If she cannot find a ride , perhaps she can sign rideshare waiver in the documents ( via [employer]) and we can book her for [employer] for today

## 2025-07-25 NOTE — PROGRESS NOTES
Name: Britni Curran      : 1940      MRN: [id number]  Encounter Provider: Sarah Schnitzler, PA-C  Encounter Date: 2025   Encounter department: [facility] UROLOGY [place]  :  Assessment & Plan  Urinary retention  - Cronin catheter placed this week after hospitalization for GI bleed  - Proceed with TOV as scheduled on 25   - Ensure adequate hydration and avoidance of constipation  - If fails TOV (PVR >300), reinsert cronin and repeat TOV in 2 weeks.            History of Present Illness   Britni Curran is a 84 y.o. female who presents for new patient evaluation of urinary retention. She was recently admitted this week with GI bleed. Had EGD while hospitalized. Has chronic constipation. Developed urinary retention with PVR >400 mL and required cronin catheter insertion. She denies any history of urinary retention prior to this. No prior  surgeries.     CT A/P from 25 - Negative for urologic standpoint. UA negative for UTI or hematuria.     Review of Systems   Constitutional:  Negative for chills and fever.   Respiratory:  Negative for shortness of breath.    Cardiovascular:  Negative for chest pain.   Gastrointestinal:  Negative for abdominal pain.   Genitourinary:  Negative for dysuria, flank pain, hematuria and pelvic pain.   Neurological:  Negative for dizziness.          Objective   There were no vitals taken for this visit.    Physical Exam  Constitutional:       Appearance: Normal appearance.   HENT:      Head: Normocephalic and atraumatic.      Right Ear: External ear normal.      Left Ear: External ear normal.      Nose: Nose normal.     Eyes:      General: No scleral icterus.     Conjunctiva/sclera: Conjunctivae normal.       Cardiovascular:      Pulses: Normal pulses.   Pulmonary:      Effort: Pulmonary effort is normal.   Genitourinary:     Comments: Cronin patent for clear yellow urine    Musculoskeletal:      Cervical back: Normal range of motion.       "Comments: Uses wheelchair to ambulate     Skin:     General: Skin is warm and dry.     Neurological:      General: No focal deficit present.      Mental Status: She is alert and oriented to person, place, and time.     Psychiatric:         Mood and Affect: Mood normal.         Behavior: Behavior normal.         Thought Content: Thought content normal.         Judgment: Judgment normal.          Results   No results found for: \"PSA\"  Lab Results   Component Value Date    CALCIUM 9.2 07/22/2025    K 3.7 07/22/2025    CO2 27 07/22/2025     07/22/2025    BUN 12 07/22/2025    CREATININE 0.56 (L) 07/22/2025     Lab Results   Component Value Date    WBC 8.20 07/22/2025    HGB 9.8 (L) 07/22/2025    HCT 29.8 (L) 07/22/2025    MCV 91 07/22/2025     07/22/2025       Office Urine Dip  No results found for this or any previous visit (from the past hour).        "

## 2025-07-25 NOTE — TELEPHONE ENCOUNTER
Niya returned call, provider's message was relayed to her. Niya states her company works closely with [facility] because they do not provide skilled nursing, only PT. She states at times her company also assist with PT when [facility] is not able to. She states in addition to a referral they require a signed face to face office visit note.

## 2025-07-25 NOTE — TELEPHONE ENCOUNTER
Please notify pt that since she is getting PT through [facility] then it is best to get RN services through them as well. If she wants to stay with them then I can  place the referral

## 2025-07-28 ENCOUNTER — TELEPHONE (OUTPATIENT)
Age: 85
End: 2025-07-28

## 2025-07-28 PROCEDURE — 88342 IMHCHEM/IMCYTCHM 1ST ANTB: CPT | Performed by: PATHOLOGY

## 2025-07-28 PROCEDURE — 88305 TISSUE EXAM BY PATHOLOGIST: CPT | Performed by: PATHOLOGY

## 2025-07-28 NOTE — TELEPHONE ENCOUNTER
Patient called in. Patient has scheduled TOV appt on 8/5. She is inquiring about transportation being set up for her for both appts.

## 2025-07-28 NOTE — TELEPHONE ENCOUNTER
Lilly from [facility] called stating [facility] does not do skilled nursing. In order for [facility] to go to the patient's home they will need the telemedicine visit from 7/23/25 signed by the provider and office visit note will need to state patient was seen for a telemedicine visit and have detailed documentation.    Please advise

## 2025-07-28 NOTE — TELEPHONE ENCOUNTER
Called and spoke to patient . She is requesting a ride that will come and help her down her steps and into a car. Advised that our Lyft service is just 5 rides per calendar year and it is just [employer] service that is not affiliated with [facility] and they will not help her into the car. She said she will try to find an alternate ride and call us back if needed. Advised she may want to try to call her insurance company as sometimes they can assist with rides . If she wants us to set up rideshare she needs to sign the rideshare waiver in her chart under documents via [employer]

## 2025-07-29 ENCOUNTER — OFFICE VISIT (OUTPATIENT)
Age: 85
End: 2025-07-29
Payer: MEDICARE

## 2025-07-29 ENCOUNTER — APPOINTMENT (OUTPATIENT)
Age: 85
End: 2025-07-29
Payer: MEDICARE

## 2025-07-29 ENCOUNTER — NURSE TRIAGE (OUTPATIENT)
Age: 85
End: 2025-07-29

## 2025-07-29 VITALS
SYSTOLIC BLOOD PRESSURE: 140 MMHG | WEIGHT: 223.6 LBS | RESPIRATION RATE: 18 BRPM | BODY MASS INDEX: 43.9 KG/M2 | DIASTOLIC BLOOD PRESSURE: 60 MMHG | HEIGHT: 60 IN | OXYGEN SATURATION: 95 % | TEMPERATURE: 97.9 F | HEART RATE: 86 BPM

## 2025-07-29 DIAGNOSIS — K59.00 CONSTIPATION: ICD-10-CM

## 2025-07-29 DIAGNOSIS — I10 ESSENTIAL HYPERTENSION: Primary | ICD-10-CM

## 2025-07-29 PROCEDURE — 99214 OFFICE O/P EST MOD 30 MIN: CPT | Performed by: FAMILY MEDICINE

## 2025-07-29 PROCEDURE — G2211 COMPLEX E/M VISIT ADD ON: HCPCS | Performed by: FAMILY MEDICINE

## 2025-07-29 PROCEDURE — 74018 RADEX ABDOMEN 1 VIEW: CPT

## 2025-07-29 NOTE — TELEPHONE ENCOUNTER
Note has been completed and the referral has been addended to include PT and skilled nursing for [facility].

## 2025-07-30 ENCOUNTER — PATIENT OUTREACH (OUTPATIENT)
Dept: CASE MANAGEMENT | Facility: OTHER | Age: 85
End: 2025-07-30

## 2025-08-05 ENCOUNTER — PROCEDURE VISIT (OUTPATIENT)
Dept: UROLOGY | Facility: CLINIC | Age: 85
End: 2025-08-05
Payer: MEDICARE

## 2025-08-05 VITALS
SYSTOLIC BLOOD PRESSURE: 140 MMHG | HEART RATE: 77 BPM | BODY MASS INDEX: 43.78 KG/M2 | HEIGHT: 60 IN | DIASTOLIC BLOOD PRESSURE: 78 MMHG | TEMPERATURE: 97.8 F | OXYGEN SATURATION: 95 % | WEIGHT: 223 LBS

## 2025-08-05 DIAGNOSIS — R33.9 URINARY RETENTION: Primary | ICD-10-CM

## 2025-08-05 LAB — POST-VOID RESIDUAL VOLUME, ML POC: 47 ML

## 2025-08-05 PROCEDURE — 51798 US URINE CAPACITY MEASURE: CPT

## 2025-08-08 NOTE — ED PROVIDER NOTES
Time reflects when diagnosis was documented in both MDM as applicable and the Disposition within this note       Time User Action Codes Description Comment    7/22/2025 11:14 PM Carson Nice Add [R33.9] Urinary retention     7/22/2025 11:18 PM Carson Nice Add [K59.00] Constipation           ED Disposition       ED Disposition   Discharge    Condition   Stable    Date/Time   Tue Jul 22, 2025 11:14 PM    Comment   Britni Curran discharge to home/self care.                   Assessment & Plan       Medical Decision Making  84-year-old female just discharged after being admitted for presumed GI bleed, she came back into the emergency room today complaining of difficulty with urination and general fatigue/malaise.  Postvoid residual around 400 mL here, suspect urinary retention secondary to anesthesia/sedation that she received for EGD yesterday.  CT imaging without evidence of physical/mechanical obstruction.  Either way Cisneros catheter placed with good return, no evidence of infection on urinalysis.  Other labs are stable/reassuring.  Will plan for discharge with follow-up with urology and return precautions.    Amount and/or Complexity of Data Reviewed  Labs: ordered. Decision-making details documented in ED Course.  Radiology: ordered. Decision-making details documented in ED Course.    Risk  OTC drugs.  Prescription drug management.        ED Course as of 08/08/25 1308   Tue Jul 22, 2025 1951 EKG independently interpreted by me: Normal sinus rhythm at rate of 87.  There is a right bundle branch block, otherwise normal intervals.  No significant ST elevation or depression to suggest acute cardiac ischemia.  Compared to previous EKG, no significant change.       Medications   sodium chloride 0.9 % bolus 1,000 mL (0 mL Intravenous Stopped 7/22/25 2343)   iohexol (OMNIPAQUE) 350 MG/ML injection (MULTI-DOSE) 100 mL (100 mL Intravenous Given 7/22/25 2037)       ED Risk Strat Scores                    No data  "recorded                            History of Present Illness       Chief Complaint   Patient presents with    Urinary Retention     Ems from home Was d/c from egd earlier, states since then she has been dribbling urine.       Past Medical History[1]   Past Surgical History[2]   Family History[3]   Social History[4]   E-Cigarette/Vaping    E-Cigarette Use Never User       E-Cigarette/Vaping Substances    Nicotine No     THC No     CBD No     Flavoring No     Other No     Unknown No       I have reviewed and agree with the history as documented.     Britni is a very pleasant 84-year-old female here for evaluation of multiple complaints.  She was just discharged from [facility] earlier today.  She been admitted on 7/20/2025 for evaluation of hyponatremia and GI bleed.  She underwent EGD yesterday which showed single clean-based ulcer.  Biopsies were performed.  She says that since being discharged she has had generalized abdominal discomfort and difficulty urinating.  She feels sensation of urinary urgency but feels as if she cannot fully empty her bladder.  She has been \"dribbling\" somewhat.  Denies fevers.  Denies overt chest pain or shortness of breath but complains of feeling generally unwell and fatigued.        Review of Systems   Constitutional:  Positive for fatigue. Negative for chills and fever.   HENT:  Negative for ear pain and sore throat.    Eyes:  Negative for visual disturbance.   Respiratory:  Negative for cough and shortness of breath.    Cardiovascular:  Negative for chest pain.   Gastrointestinal:  Positive for abdominal pain. Negative for nausea and vomiting.   Genitourinary:  Positive for difficulty urinating and pelvic pain. Negative for dysuria and hematuria.   Musculoskeletal:  Negative for arthralgias and back pain.   Skin:  Negative for color change and rash.   Neurological:  Negative for syncope.   All other systems reviewed and are negative.          Objective       ED " Triage Vitals   Temperature Pulse Blood Pressure Respirations SpO2 Patient Position - Orthostatic VS   07/22/25 1941 07/22/25 1941 07/22/25 1941 07/22/25 1941 07/22/25 1941 07/22/25 1945   99.2 °F (37.3 °C) 92 161/70 18 98 % Lying      Temp src Heart Rate Source BP Location FiO2 (%) Pain Score    -- 07/22/25 1945 07/22/25 1945 -- --     Monitor Right arm        Vitals      Date and Time Temp Pulse SpO2 Resp BP Pain Score FACES Pain Rating User   07/22/25 2300 -- 91 97 % 20 199/81 -- --    07/22/25 2215 -- 91 96 % 20 -- -- Incontinent of moderate amount of urine observed before F/C insertion. Clean dry linen provided. Pt tolerated well. Re-scanned bladder via bladder scanner, fluid remains in bladder for 400 mLs of fluid. Assigned ER Provider made updated for same. --    07/22/25 2200 -- 87 95 % 20 158/68 -- --    07/22/25 2000 -- 88 95 % 20 170/72 -- --    07/22/25 1945 -- 88 95 % 20 161/70 -- --    07/22/25 1941 99.2 °F (37.3 °C) 92 98 % 18 161/70 -- -- AM            Physical Exam  Vitals and nursing note reviewed.   Constitutional:       General: She is not in acute distress.     Appearance: She is well-developed.   HENT:      Head: Normocephalic and atraumatic.      Mouth/Throat:      Mouth: Mucous membranes are moist.     Eyes:      Conjunctiva/sclera: Conjunctivae normal.       Cardiovascular:      Rate and Rhythm: Normal rate and regular rhythm.      Heart sounds: No murmur heard.  Pulmonary:      Effort: Pulmonary effort is normal. No respiratory distress.      Breath sounds: Normal breath sounds.   Abdominal:      Palpations: Abdomen is soft.      Tenderness: There is abdominal tenderness. There is no guarding or rebound.      Comments: Soft and nondistended with mild generalized tenderness, no rebound or guarding.     Musculoskeletal:      Cervical back: Neck supple.     Skin:     General: Skin is warm and dry.      Capillary Refill: Capillary refill takes less than 2 seconds.     Neurological:       General: No focal deficit present.      Mental Status: She is alert and oriented to person, place, and time.     Psychiatric:         Mood and Affect: Mood normal.         Results Reviewed       Procedure Component Value Units Date/Time    Fingerstick Glucose (POCT) [[id number]]  (Normal) Collected: 07/22/25 2349    Lab Status: Final result Specimen: Blood Updated: 07/22/25 2351     POC Glucose 131 mg/dl     UA w Reflex to Microscopic w Reflex to Culture [[id number]] Collected: 07/22/25 2220    Lab Status: Final result Specimen: Urine, Indwelling Cisneros Catheter Updated: 07/22/25 2251     Color, UA Colorless     Clarity, UA Clear     Specific Gravity, UA 1.020     pH, UA 7.5     Leukocytes, UA Negative     Nitrite, UA Negative     Protein, UA Negative mg/dl      Glucose, UA Negative mg/dl      Ketones, UA Negative mg/dl      Urobilinogen, UA <2.0 mg/dl      Bilirubin, UA Negative     Occult Blood, UA Negative    HS Troponin 0hr (reflex protocol) [[id number]]  (Normal) Collected: 07/22/25 2020    Lab Status: Final result Specimen: Blood from Arm, Left Updated: 07/22/25 2055     hs TnI 0hr 9 ng/L     Lactic acid, plasma (w/reflex if result > 2.0) [[id number]]  (Normal) Collected: 07/22/25 2020    Lab Status: Final result Specimen: Blood from Arm, Left Updated: 07/22/25 2052     LACTIC ACID 1.1 mmol/L     Narrative:      Result may be elevated if tourniquet was used during collection.    Basic metabolic panel [[id number]]  (Abnormal) Collected: 07/22/25 2020    Lab Status: Final result Specimen: Blood from Arm, Left Updated: 07/22/25 2052     Sodium 138 mmol/L      Potassium 3.7 mmol/L      Chloride 103 mmol/L      CO2 27 mmol/L      ANION GAP 8 mmol/L      BUN 12 mg/dL      Creatinine 0.56 mg/dL      Glucose 130 mg/dL      Calcium 9.2 mg/dL      eGFR 87 ml/min/1.73sq m     Narrative:      National Kid[employer] guidelines for Chronic Kidney Disease (CKD):     Stage 1 with normal or high GFR (GFR > 90  mL/min/1.73 square meters)    Stage 2 Mild CKD (GFR = 60-89 mL/min/1.73 square meters)    Stage 3A Moderate CKD (GFR = 45-59 mL/min/1.73 square meters)    Stage 3B Moderate CKD (GFR = 30-44 mL/min/1.73 square meters)    Stage 4 Severe CKD (GFR = 15-29 mL/min/1.73 square meters)    Stage 5 End Stage CKD (GFR <15 mL/min/1.73 square meters)  Note: GFR calculation is accurate only with a steady state creatinine    Hepatic function panel [[id number]]  (Abnormal) Collected: 07/22/25 2020    Lab Status: Final result Specimen: Blood from Arm, Left Updated: 07/22/25 2052     Total Bilirubin 0.28 mg/dL      Bilirubin, Direct 0.06 mg/dL      Alkaline Phosphatase 71 U/L      AST 14 U/L      ALT 13 U/L      Total Protein 6.0 g/dL      Albumin 3.9 g/dL     CBC and differential [[id number]]  (Abnormal) Collected: 07/22/25 2020    Lab Status: Final result Specimen: Blood from Arm, Left Updated: 07/22/25 2032     WBC 8.20 Thousand/uL      RBC 3.29 Million/uL      Hemoglobin 9.8 g/dL      Hematocrit 29.8 %      MCV 91 fL      MCH 29.8 pg      MCHC 32.9 g/dL      RDW 14.1 %      MPV 10.7 fL      Platelets 284 Thousands/uL      nRBC 0 /100 WBCs      Segmented % 56 %      Immature Grans % 1 %      Lymphocytes % 34 %      Monocytes % 8 %      Eosinophils Relative 1 %      Basophils Relative 0 %      Absolute Neutrophils 4.52 Thousands/µL      Absolute Immature Grans 0.08 Thousand/uL      Absolute Lymphocytes 2.79 Thousands/µL      Absolute Monocytes 0.68 Thousand/µL      Eosinophils Absolute 0.10 Thousand/µL      Basophils Absolute 0.03 Thousands/µL             CT abdomen pelvis with contrast   Final Interpretation by Sergo Hendricks DO (07/22 2129)      No acute findings in the abdomen or pelvis to include small bowel obstruction, colitis, or acute appendicitis.      Scattered diverticulosis without evidence for acute diverticulitis. Other stable findings as detailed above.         Workstation performed: [id number]         XR chest 1  view portable   Final Interpretation by Nahed Worthy MD (07/23 0816)      No acute cardiopulmonary disease.            Workstation performed: [id number]             Procedures    ED Medication and Procedure Management   Prior to Admission Medications   Prescriptions Last Dose Informant Patient Reported? Taking?   Accu-Chek FastClix Lancets MISC  Self Yes No   Sig: Use   B Complex-Biotin-FA (HM Vitamin B100 Complex) TABS  Self Yes No   Sig: Take by mouth   B-D ULTRAFINE III SHORT PEN 31G X 8 MM MISC  Self Yes No   Sig: in the morning and at noon and in the evening and before bedtime.   Blood Glucose Monitoring Suppl (Accu-Chek Guide) w/Device KIT  Self Yes No   Sig: Use   Probiotic Product (Align) 4 MG CAPS  Self Yes No   Sig: Take 1 capsule by mouth   Restasis 0.05 % ophthalmic emulsion  Self Yes No   amLODIPine (NORVASC) 5 mg tablet  Self Yes No   Sig: Take 5 mg by mouth in the morning.   ascorbic Acid (VITAMIN C) 500 MG CPCR  Self Yes No   bisacodyl 5 MG EC tablet  Self No No   Sig: TAKE 1 TABLET (5 MG TOTAL) BY MOUTH IN THE MORNING   cilostazol (PLETAL) 100 mg tablet  Self No No   Sig: TAKE 1 TABLET BY MOUTH TWICE A DAY   glucose blood (Accu-Chek Guide) test strip  Self Yes No   hydrOXYzine HCL (ATARAX) 50 mg tablet  Self No No   Sig: TAKE 1 TABLET BY MOUTH DAILY AT BEDTIME   insulin aspart (NovoLOG FlexPen ReliOn) 100 UNIT/ML injection pen  Self Yes No   insulin degludec (Tresiba FlexTouch) 100 units/mL injection pen  Self Yes No   Sig: Inject 52 Units under the skin daily at bedtime   ipratropium (ATROVENT) 0.06 % nasal spray  Self Yes No   linaCLOtide 72 MCG CAPS  Self No No   Sig: Take 72 mcg by mouth daily at least 30 minutes prior to the first meal of the day   losartan-hydrochlorothiazide (HYZAAR) 100-25 MG per tablet  Self Yes No   Sig: Take 1 tablet by mouth in the morning.   meclizine (ANTIVERT) 25 mg tablet  Self Yes No   Sig: Take by mouth as needed in the morning and as needed at noon and as  needed in the evening for dizziness.   metoprolol tartrate (LOPRESSOR) 25 mg tablet  Self No No   Sig: TAKE 1 TABLET BY MOUTH TWICE A DAY   nystatin (MYCOSTATIN) cream  Self No No   Sig: APPLY TO AFFECTED AREA TWICE A DAY   ondansetron (ZOFRAN-ODT) 4 mg disintegrating tablet  Self No No   Sig: Take 1 tablet (4 mg total) by mouth every 6 (six) hours as needed for nausea or vomiting   pantoprazole (PROTONIX) 40 mg tablet  Self No No   Sig: Take 1 tablet (40 mg total) by mouth 2 (two) times a day before meals for 120 doses   rosuvastatin (CRESTOR) 40 MG tablet  Self No No   Sig: TAKE 1 TABLET BY MOUTH EVERY DAY   simethicone (MYLICON,GAS-X) 180 MG capsule  Self No No   Sig: Take 1 capsule (180 mg total) by mouth every 6 (six) hours as needed for flatulence   sucralfate (CARAFATE) 1 g tablet  Self No No   Sig: TAKE 1 TABLET BY ORAL ROUTE 4 TIMES EVERY DAY ON AN EMPTY STOMACH 1 HOUR BEFORE MEALS AND AT BEDTIME      Facility-Administered Medications: None     Discharge Medication List as of 7/22/2025 11:28 PM        START taking these medications    Details   senna (SENOKOT) 8.6 mg Take 1 tablet (8.6 mg total) by mouth daily at bedtime, Starting Tue 7/22/2025, Normal           CONTINUE these medications which have NOT CHANGED    Details   Accu-Chek FastClix Lancets MISC Use, Historical Med      amLODIPine (NORVASC) 5 mg tablet Take 5 mg by mouth in the morning., Starting Sun 3/10/2024, Historical Med      ascorbic Acid (VITAMIN C) 500 MG CPCR Historical Med      B Complex-Biotin-FA (HM Vitamin B100 Complex) TABS Take by mouth, Historical Med      B-D ULTRAFINE III SHORT PEN 31G X 8 MM MISC in the morning and at noon and in the evening and before bedtime., Starting Wed 4/26/2023, Historical Med      bisacodyl 5 MG EC tablet TAKE 1 TABLET (5 MG TOTAL) BY MOUTH IN THE MORNING, Starting Tue 7/15/2025, Normal      Blood Glucose Monitoring Suppl (Accu-Chek Guide) w/Device KIT Use, Historical Med      cilostazol (PLETAL) 100 mg  tablet TAKE 1 TABLET BY MOUTH TWICE A DAY, Starting Thu 6/12/2025, Normal      glucose blood (Accu-Chek Guide) test strip Historical Med      hydrOXYzine HCL (ATARAX) 50 mg tablet TAKE 1 TABLET BY MOUTH DAILY AT BEDTIME, Starting Fri 4/4/2025, Normal      insulin aspart (NovoLOG FlexPen ReliOn) 100 UNIT/ML injection pen Historical Med      insulin degludec (Tresiba FlexTouch) 100 units/mL injection pen Inject 52 Units under the skin daily at bedtime, Historical Med      ipratropium (ATROVENT) 0.06 % nasal spray Starting Tue 11/29/2022, Historical Med      linaCLOtide 72 MCG CAPS Take 72 mcg by mouth daily at least 30 minutes prior to the first meal of the day, Starting Thu 5/8/2025, Normal      losartan-hydrochlorothiazide (HYZAAR) 100-25 MG per tablet Take 1 tablet by mouth in the morning., Historical Med      meclizine (ANTIVERT) 25 mg tablet Take by mouth as needed in the morning and as needed at noon and as needed in the evening for dizziness., Historical Med      metoprolol tartrate (LOPRESSOR) 25 mg tablet TAKE 1 TABLET BY MOUTH TWICE A DAY, Starting Sun 6/8/2025, Normal      nystatin (MYCOSTATIN) cream APPLY TO AFFECTED AREA TWICE A DAY, Starting Mon 2/5/2024, Normal      ondansetron (ZOFRAN-ODT) 4 mg disintegrating tablet Take 1 tablet (4 mg total) by mouth every 6 (six) hours as needed for nausea or vomiting, Starting Tue 4/8/2025, Normal      pantoprazole (PROTONIX) 40 mg tablet Take 1 tablet (40 mg total) by mouth 2 (two) times a day before meals for 120 doses, Starting Tue 7/22/2025, Until Sat 9/20/2025, Normal      Probiotic Product (Align) 4 MG CAPS Take 1 capsule by mouth, Historical Med      Restasis 0.05 % ophthalmic emulsion Historical Med      rosuvastatin (CRESTOR) 40 MG tablet TAKE 1 TABLET BY MOUTH EVERY DAY, Starting Tue 4/8/2025, Normal      simethicone (MYLICON,GAS-X) 180 MG capsule Take 1 capsule (180 mg total) by mouth every 6 (six) hours as needed for flatulence, Starting Sun 4/27/2025,  Normal      sucralfate (CARAFATE) 1 g tablet TAKE 1 TABLET BY ORAL ROUTE 4 TIMES EVERY DAY ON AN EMPTY STOMACH 1 HOUR BEFORE MEALS AND AT BEDTIME, Starting Mon 3/17/2025, Normal      magnesium hydroxide (MILK OF MAGNESIA) 400 mg/5 mL oral suspension Take by mouth daily as needed, Historical Med      montelukast (SINGULAIR) 10 mg tablet TAKE 1 TABLET BY MOUTH EVERYDAY AT BEDTIME, Starting Fri 3/29/2024, Normal      polyethylene glycol (GOLYTELY) 4000 mL solution Take 4,000 mL by mouth once for 1 dose, Starting Sun 2025, Normal             ED SEPSIS DOCUMENTATION   Time reflects when diagnosis was documented in both MDM as applicable and the Disposition within this note       Time User Action Codes Description Comment    2025 11:14 PM Carson Nice [R33.9] Urinary retention     2025 11:18 PM Carson Nice [K59.00] Constipation                        [1]   Past Medical History:  Diagnosis Date    Allergic years ago    penicillin    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    Coronary artery disease 3/2019    triple bipass surgery    CPAP (continuous positive airway pressure) dependence     Diabetes mellitus (HCC)     Hyperlipidemia     Hypertension years ago    Obesity years ago    Peripheral artery disease (HCC) 2018    Scoliosis years ago    Sleep apnea     CPAP occasionally    Visual impairment I wear glasses   [2]   Past Surgical History:  Procedure Laterality Date    CHOLECYSTECTOMY      COLONOSCOPY      CORONARY ARTERY BYPASS GRAFT      x 3    EYE SURGERY      KNEE SURGERY  years ago    arthriscopic surgery left knee   [3]   Family History  Problem Relation Name Age of Onset    Heart disease Mother jostin meier     Diabetes Mother jostin harsha     Hypertension Mother jostinmanolo meier     Heart disease Father Humble Meier     Hypertension Father Humble Meier     Diabetes Father Humbleigor Meier     Heart failure Brother Feliciano CAMARENA Harsha Sr          of heart failure    Depression Daughter  Laine Curran     Breast cancer Daughter Laine Curran     Dementia Brother Rinku Meier     Prostate cancer Brother Rinku Meier     Hearing loss Brother Rinku Meier     Depression Daughter Laine Curran     Breast cancer Daughter Laine Curran     Dementia Brother Rinku Meier     Prostate cancer Brother Rinku Meier     Hearing loss Brother Rinku Meier    [4]   Social History  Tobacco Use    Smoking status: Never     Passive exposure: Never    Smokeless tobacco: Never   Vaping Use    Vaping status: Never Used   Substance Use Topics    Alcohol use: Not Currently     Alcohol/week: 1.0 standard drink of alcohol     Comment: occasional glass of wine but not for several years    Drug use: Never        Carson Nice MD  08/09/25 6717

## 2025-08-20 ENCOUNTER — TELEPHONE (OUTPATIENT)
Age: 85
End: 2025-08-20